# Patient Record
Sex: MALE | Race: WHITE | NOT HISPANIC OR LATINO | Employment: UNEMPLOYED | URBAN - METROPOLITAN AREA
[De-identification: names, ages, dates, MRNs, and addresses within clinical notes are randomized per-mention and may not be internally consistent; named-entity substitution may affect disease eponyms.]

---

## 2022-05-24 ENCOUNTER — CLINICAL SUPPORT (OUTPATIENT)
Dept: INTERNAL MEDICINE | Facility: CLINIC | Age: 42
End: 2022-05-24
Payer: MEDICAID

## 2022-05-24 DIAGNOSIS — Z86.79 HISTORY OF CONGESTIVE HEART FAILURE: ICD-10-CM

## 2022-05-24 DIAGNOSIS — I51.3 LEFT VENTRICULAR THROMBUS: Primary | ICD-10-CM

## 2022-05-24 PROCEDURE — 99211 OFF/OP EST MAY X REQ PHY/QHP: CPT | Mod: PBBFAC

## 2022-05-24 NOTE — PROGRESS NOTES
Poc Pt/Inr results 1.8/21.9 (Normal range 2.0-3.0) Inr level below normal range prescribed Asa 325 mg ran out missed several doses using a dietary supplement Pediasure contains vitamin k. Patient weight 203 pounds. Takes Warfarin 5 mg. Return to clinic 614/22

## 2022-05-26 ENCOUNTER — OFFICE VISIT (OUTPATIENT)
Dept: INTERNAL MEDICINE | Facility: CLINIC | Age: 42
End: 2022-05-26
Payer: MEDICAID

## 2022-05-26 ENCOUNTER — LAB VISIT (OUTPATIENT)
Dept: LAB | Facility: HOSPITAL | Age: 42
End: 2022-05-26
Payer: MEDICAID

## 2022-05-26 VITALS
OXYGEN SATURATION: 98 % | RESPIRATION RATE: 20 BRPM | SYSTOLIC BLOOD PRESSURE: 115 MMHG | HEIGHT: 70 IN | BODY MASS INDEX: 29.86 KG/M2 | HEART RATE: 56 BPM | DIASTOLIC BLOOD PRESSURE: 75 MMHG | WEIGHT: 208.56 LBS | TEMPERATURE: 98 F

## 2022-05-26 DIAGNOSIS — I51.3 LEFT VENTRICULAR THROMBUS: ICD-10-CM

## 2022-05-26 DIAGNOSIS — I50.9 CONGESTIVE HEART FAILURE, UNSPECIFIED HF CHRONICITY, UNSPECIFIED HEART FAILURE TYPE: ICD-10-CM

## 2022-05-26 DIAGNOSIS — Z13.31 POSITIVE DEPRESSION SCREENING: Primary | ICD-10-CM

## 2022-05-26 DIAGNOSIS — Z13.1 SCREENING FOR DIABETES MELLITUS: ICD-10-CM

## 2022-05-26 DIAGNOSIS — Z72.89 VAPES NON-NICOTINE CONTAINING SUBSTANCE: ICD-10-CM

## 2022-05-26 LAB
ALBUMIN SERPL-MCNC: 4.1 GM/DL (ref 3.5–5)
ALBUMIN/GLOB SERPL: 1 RATIO (ref 1.1–2)
ALP SERPL-CCNC: 100 UNIT/L (ref 40–150)
ALT SERPL-CCNC: 24 UNIT/L (ref 0–55)
APPEARANCE UR: CLEAR
AST SERPL-CCNC: 16 UNIT/L (ref 5–34)
BACTERIA #/AREA URNS AUTO: ABNORMAL /HPF
BASOPHILS # BLD AUTO: 0.07 X10(3)/MCL (ref 0–0.2)
BASOPHILS NFR BLD AUTO: 0.9 %
BILIRUB UR QL STRIP.AUTO: NEGATIVE MG/DL
BILIRUBIN DIRECT+TOT PNL SERPL-MCNC: 0.4 MG/DL
BUN SERPL-MCNC: 32.2 MG/DL (ref 8.9–20.6)
CALCIUM SERPL-MCNC: 9.9 MG/DL (ref 8.4–10.2)
CHLORIDE SERPL-SCNC: 104 MMOL/L (ref 98–107)
CO2 SERPL-SCNC: 28 MMOL/L (ref 22–29)
COLOR UR AUTO: COLORLESS
CREAT SERPL-MCNC: 1.65 MG/DL (ref 0.73–1.18)
CREAT UR-MCNC: 47.8 MG/DL (ref 63–166)
EOSINOPHIL # BLD AUTO: 0.39 X10(3)/MCL (ref 0–0.9)
EOSINOPHIL NFR BLD AUTO: 5.3 %
ERYTHROCYTE [DISTWIDTH] IN BLOOD BY AUTOMATED COUNT: 14.1 % (ref 11.5–17)
EST. AVERAGE GLUCOSE BLD GHB EST-MCNC: 154.2 MG/DL
GLOBULIN SER-MCNC: 4.1 GM/DL (ref 2.4–3.5)
GLUCOSE SERPL-MCNC: 84 MG/DL (ref 74–100)
GLUCOSE UR QL STRIP.AUTO: NORMAL MG/DL
HBA1C MFR BLD: 7 %
HCT VFR BLD AUTO: 43.5 % (ref 42–52)
HGB BLD-MCNC: 14.2 GM/DL (ref 14–18)
HYALINE CASTS #/AREA URNS LPF: ABNORMAL /LPF
IMM GRANULOCYTES # BLD AUTO: 0.02 X10(3)/MCL (ref 0–0.02)
IMM GRANULOCYTES NFR BLD AUTO: 0.3 % (ref 0–0.43)
KETONES UR QL STRIP.AUTO: NEGATIVE MG/DL
LEUKOCYTE ESTERASE UR QL STRIP.AUTO: NEGATIVE UNIT/L
LYMPHOCYTES # BLD AUTO: 1.5 X10(3)/MCL (ref 0.6–4.6)
LYMPHOCYTES NFR BLD AUTO: 20.3 %
MCH RBC QN AUTO: 28.8 PG (ref 27–31)
MCHC RBC AUTO-ENTMCNC: 32.6 MG/DL (ref 33–36)
MCV RBC AUTO: 88.2 FL (ref 80–94)
MICROALBUMIN UR-MCNC: 14.9 UG/ML
MICROALBUMIN/CREAT RATIO PNL UR: 31.2 MG/GM CR (ref 0–30)
MONOCYTES # BLD AUTO: 0.77 X10(3)/MCL (ref 0.1–1.3)
MONOCYTES NFR BLD AUTO: 10.4 %
MUCOUS THREADS URNS QL MICRO: ABNORMAL /LPF
NEUTROPHILS # BLD AUTO: 4.7 X10(3)/MCL (ref 2.1–9.2)
NEUTROPHILS NFR BLD AUTO: 62.8 %
NITRITE UR QL STRIP.AUTO: NEGATIVE
NRBC BLD AUTO-RTO: 0 %
PH UR STRIP.AUTO: 5 [PH]
PLATELET # BLD AUTO: 193 X10(3)/MCL (ref 130–400)
PMV BLD AUTO: 11.1 FL (ref 9.4–12.4)
POTASSIUM SERPL-SCNC: 4.4 MMOL/L (ref 3.5–5.1)
PROT SERPL-MCNC: 8.2 GM/DL (ref 6.4–8.3)
PROT UR QL STRIP.AUTO: NEGATIVE MG/DL
RBC # BLD AUTO: 4.93 X10(6)/MCL (ref 4.7–6.1)
RBC #/AREA URNS AUTO: ABNORMAL /HPF
RBC UR QL AUTO: NEGATIVE UNIT/L
SODIUM SERPL-SCNC: 140 MMOL/L (ref 136–145)
SP GR UR STRIP.AUTO: 1.01
SQUAMOUS #/AREA URNS LPF: ABNORMAL /HPF
UROBILINOGEN UR STRIP-ACNC: NORMAL MG/DL
WBC # SPEC AUTO: 7.4 X10(3)/MCL (ref 4.5–11.5)
WBC #/AREA URNS AUTO: ABNORMAL /HPF

## 2022-05-26 PROCEDURE — 1159F MED LIST DOCD IN RCRD: CPT | Mod: CPTII,,,

## 2022-05-26 PROCEDURE — 3074F SYST BP LT 130 MM HG: CPT | Mod: CPTII,,,

## 2022-05-26 PROCEDURE — 3008F BODY MASS INDEX DOCD: CPT | Mod: CPTII,,,

## 2022-05-26 PROCEDURE — 81001 URINALYSIS AUTO W/SCOPE: CPT

## 2022-05-26 PROCEDURE — 82043 UR ALBUMIN QUANTITATIVE: CPT

## 2022-05-26 PROCEDURE — 3008F PR BODY MASS INDEX (BMI) DOCUMENTED: ICD-10-PCS | Mod: CPTII,,,

## 2022-05-26 PROCEDURE — 80053 COMPREHEN METABOLIC PANEL: CPT

## 2022-05-26 PROCEDURE — 1160F RVW MEDS BY RX/DR IN RCRD: CPT | Mod: CPTII,,,

## 2022-05-26 PROCEDURE — 99213 OFFICE O/P EST LOW 20 MIN: CPT | Mod: S$PBB,,,

## 2022-05-26 PROCEDURE — 99214 OFFICE O/P EST MOD 30 MIN: CPT | Mod: PBBFAC

## 2022-05-26 PROCEDURE — 99213 PR OFFICE/OUTPT VISIT, EST, LEVL III, 20-29 MIN: ICD-10-PCS | Mod: S$PBB,,,

## 2022-05-26 PROCEDURE — 3078F DIAST BP <80 MM HG: CPT | Mod: CPTII,,,

## 2022-05-26 PROCEDURE — 1160F PR REVIEW ALL MEDS BY PRESCRIBER/CLIN PHARMACIST DOCUMENTED: ICD-10-PCS | Mod: CPTII,,,

## 2022-05-26 PROCEDURE — 36415 COLL VENOUS BLD VENIPUNCTURE: CPT

## 2022-05-26 PROCEDURE — 4010F PR ACE/ARB THEARPY RXD/TAKEN: ICD-10-PCS | Mod: CPTII,,,

## 2022-05-26 PROCEDURE — 85025 COMPLETE CBC W/AUTO DIFF WBC: CPT

## 2022-05-26 PROCEDURE — 1159F PR MEDICATION LIST DOCUMENTED IN MEDICAL RECORD: ICD-10-PCS | Mod: CPTII,,,

## 2022-05-26 PROCEDURE — 3074F PR MOST RECENT SYSTOLIC BLOOD PRESSURE < 130 MM HG: ICD-10-PCS | Mod: CPTII,,,

## 2022-05-26 PROCEDURE — 83036 HEMOGLOBIN GLYCOSYLATED A1C: CPT

## 2022-05-26 PROCEDURE — 3078F PR MOST RECENT DIASTOLIC BLOOD PRESSURE < 80 MM HG: ICD-10-PCS | Mod: CPTII,,,

## 2022-05-26 PROCEDURE — 4010F ACE/ARB THERAPY RXD/TAKEN: CPT | Mod: CPTII,,,

## 2022-05-26 RX ORDER — CARVEDILOL 6.25 MG/1
6.25 TABLET ORAL 2 TIMES DAILY
COMMUNITY
Start: 2022-05-17 | End: 2023-06-05 | Stop reason: ALTCHOICE

## 2022-05-26 RX ORDER — LISINOPRIL 5 MG/1
5 TABLET ORAL DAILY
COMMUNITY
Start: 2022-05-04 | End: 2022-05-26

## 2022-05-26 RX ORDER — FUROSEMIDE 40 MG/1
40 TABLET ORAL DAILY
COMMUNITY
Start: 2022-05-04 | End: 2022-11-30 | Stop reason: ALTCHOICE

## 2022-05-26 RX ORDER — ATORVASTATIN CALCIUM 40 MG/1
40 TABLET, FILM COATED ORAL DAILY
COMMUNITY
Start: 2022-05-04 | End: 2023-05-22 | Stop reason: SDUPTHER

## 2022-05-26 RX ORDER — LISINOPRIL 20 MG/1
20 TABLET ORAL DAILY
COMMUNITY
End: 2022-11-30 | Stop reason: ALTCHOICE

## 2022-05-26 RX ORDER — LISINOPRIL 10 MG/1
10 TABLET ORAL DAILY
COMMUNITY
Start: 2022-05-17 | End: 2022-05-26 | Stop reason: CLARIF

## 2022-05-26 NOTE — PROGRESS NOTES
"  Lumbar radiculopathy; Lumbar disc herniation   seen recently at [facility] and was told to come to [facility] to check INR and refer to coumadin clinic. states he was started on coumadin for a "blood clot in my heart".  denies abnormal bleeding or bruising.    Left ventricular thrombus  CHF  Previous PCP:         Establish Care (EST PCP, C/O OF FEELING BLOATED, RASH TO BACK AREA.)    Meds?  Diet?  Exercise?  Check bp at home? No   Check blood sugar at home?  Vaccines?  Smoke, drink, illicit drugs? nonnicotine vape, no  Eyes?  Dentist?  Bowels and Bladder?        Dixon Garcia-cardio      Previous PCP: Dr. Crespo passed away years ago.       Establish Care (EST PCP, C/O OF FEELING BLOATED, RASH TO BACK AREA.)          redo laminextomy / decompression/fusion L4-5-S1 (04/22/2015) Fusion Transforaminal Lumbar Interbody with O - Arm (Right) (04/22/2015) redo laminextomy / decompression/fusion L4-5-S1 (04/22/2015) redo laminextomy / decompression/fusion L4-5-S1 (04/22/2015) Excision of intervertebral disc (01/05/2015) Laminotomy (hemilaminectomy), with decompression of nerve root(s), including partial facetectomy, foraminotomy and/or excision of herniated intervertebral disc; 1 interspace, lumbar. (01/05/2015)    I have reviewed the positive depression score which warrants active treatment with psychotherapy and/or medications. ***  "

## 2022-05-27 ENCOUNTER — PATIENT MESSAGE (OUTPATIENT)
Dept: INTERNAL MEDICINE | Facility: CLINIC | Age: 42
End: 2022-05-27
Payer: MEDICAID

## 2022-05-27 DIAGNOSIS — Z11.59 SCREENING FOR VIRAL DISEASE: ICD-10-CM

## 2022-05-27 DIAGNOSIS — Z13.220 SCREENING FOR LIPID DISORDERS: ICD-10-CM

## 2022-05-27 DIAGNOSIS — Z13.0 SCREENING FOR IRON DEFICIENCY ANEMIA: ICD-10-CM

## 2022-05-27 DIAGNOSIS — Z13.21 ENCOUNTER FOR VITAMIN DEFICIENCY SCREENING: ICD-10-CM

## 2022-05-27 DIAGNOSIS — Z13.29 SCREENING FOR THYROID DISORDER: ICD-10-CM

## 2022-05-27 DIAGNOSIS — N18.31 STAGE 3A CHRONIC KIDNEY DISEASE: Primary | ICD-10-CM

## 2022-05-27 DIAGNOSIS — E11.29 TYPE 2 DIABETES MELLITUS WITH OTHER DIABETIC KIDNEY COMPLICATION, WITHOUT LONG-TERM CURRENT USE OF INSULIN: Primary | ICD-10-CM

## 2022-05-27 DIAGNOSIS — Z11.3 ROUTINE SCREENING FOR STI (SEXUALLY TRANSMITTED INFECTION): ICD-10-CM

## 2022-05-27 DIAGNOSIS — E11.29 TYPE 2 DIABETES MELLITUS WITH OTHER DIABETIC KIDNEY COMPLICATION, WITHOUT LONG-TERM CURRENT USE OF INSULIN: ICD-10-CM

## 2022-05-27 RX ORDER — GLIPIZIDE 2.5 MG/1
2.5 TABLET, EXTENDED RELEASE ORAL
Qty: 90 TABLET | Refills: 2 | Status: SHIPPED | OUTPATIENT
Start: 2022-05-27 | End: 2022-11-30 | Stop reason: ALTCHOICE

## 2022-05-27 RX ORDER — METFORMIN HYDROCHLORIDE 500 MG/1
500 TABLET, FILM COATED, EXTENDED RELEASE ORAL
Qty: 90 TABLET | Refills: 2 | Status: SHIPPED | OUTPATIENT
Start: 2022-05-27 | End: 2022-05-27

## 2022-05-27 NOTE — PROGRESS NOTES
"Subjective:       Patient ID: Jeff Sanchez Jr. is a 41 y.o. male.    Chief Complaint: Establish Care    HPI   Jeff Sanchez Jr.is a 41 y.o.White male presenting in clinic today to Establish Care. Previous PCP: Dr. Crespo () of [place], [place]. PMH: CHF, left ventricular thrombus (currently on warfarin), and lumbar radiculopathy with laminectomy, decompression, fusion L4-5-S1. He is currently followed by [facility]. Patient's mother is present during visit. Family on mother and father's side has a strong cardiac history. Patient's younger ister has suffered a stroke previously.    Patient was admitted into [facility] in [place], [place] with new onset CHF. At that time, he was discovered to have a left ventricular thrombus. Denies CP, SOB. He does not remember what his EF is currently, states its in the "20s." His mother states the cardiologist is considering a defibrillator if his EF is not improved at his next cardiology visit.     Today patient complains of "bloating" in his abdominal area. States he ate a hot dog for lunch today and experience early satiety. He does not weigh himself daily. Strongly encouraged diet and medication compliance. Also strongly encouraged patient to weigh himself daily. His PHQ-16. He states he is depressed due to his medical condition. He denies SI/HI. He denies need for medication or counseling. Encouraged patient to inform staff whenever he is ready for any intervention.    Patient states he vapes. Denies alcohol or illicit drug use. No vaccines due today. Denies chest pain, shortness of breath, cough, headache, dizziness, weakness, abdominal pain, nausea, vomiting, diarrhea, constipation, dysuria, depression, anxiety, SI, and HI.    I have reviewed the positive depression score which warrants active treatment with psychotherapy and/or medications.       Review of Systems   Constitutional: Positive for appetite change.   HENT: Negative.    Eyes: Negative.  "   Respiratory: Negative.    Cardiovascular: Negative.    Gastrointestinal: Positive for abdominal distention.   Endocrine: Negative.    Genitourinary: Positive for frequency.   Musculoskeletal: Negative.    Integumentary:  Negative.   Allergic/Immunologic: Negative.    Neurological: Negative.  Negative for dizziness.   Hematological: Negative.    Psychiatric/Behavioral: Positive for dysphoric mood. Negative for self-injury and suicidal ideas. The patient is not nervous/anxious.    All other systems reviewed and are negative.        Objective:      Physical Exam  Constitutional:       Appearance: Normal appearance. He is normal weight.   HENT:      Head: Normocephalic.      Nose: Nose normal.      Mouth/Throat:      Mouth: Mucous membranes are moist.      Pharynx: Oropharynx is clear.   Eyes:      Extraocular Movements: Extraocular movements intact.      Conjunctiva/sclera: Conjunctivae normal.      Pupils: Pupils are equal, round, and reactive to light.   Cardiovascular:      Rate and Rhythm: Normal rate and regular rhythm.      Heart sounds: Normal heart sounds.   Pulmonary:      Effort: Pulmonary effort is normal.      Breath sounds: Normal breath sounds.   Abdominal:      General: Bowel sounds are normal. There is distension.      Palpations: Abdomen is soft.   Musculoskeletal:         General: Normal range of motion.      Cervical back: Normal range of motion.      Right lower le+ Pitting Edema present.      Left lower le+ Pitting Edema present.   Skin:     General: Skin is warm and dry.      Capillary Refill: Capillary refill takes less than 2 seconds.   Neurological:      General: No focal deficit present.      Mental Status: He is alert and oriented to person, place, and time.   Psychiatric:         Mood and Affect: Mood normal.         Assessment and Plan:       Problem List Items Addressed This Visit        Cardiac/Vascular    Congestive heart failure     Currently followed by Dr. Dixon Garcia  (cardiology).  Will request records from his office.  Continue carvedilol, furosemide, atorvastatin, and aspirin.   Keep Cardiology Clinic appts as scheduled.  Last ECHO-requesting records.  Notify the clinic if you gain 3 or more pounds in one day or 5 or more pounds in one week.  Stressed importance of daily morning weights after urination but prior to breakfast on the same scale.  Low Sodium Diet (Dash Diet - less than 2 grams of sodium per day).  Follow a low cholesterol, low saturated fat diet with less that 200mg of cholesterol a day.  Cut down on alcohol if you have more than 1 drink a day (for women) or 2 drinks a day (for men).  Maintain healthy weight with goal BMI <30. Perform 30 minutes of daily physical activity 5 days per week.  Report to ER for chest pain, SOB, difficulty breathing, abdominal distention or significant edema to lower extremities.           Relevant Orders    CBC Auto Differential (Completed)    Comprehensive Metabolic Panel (Completed)    Urinalysis, Reflex to Urine Culture Urine, Clean Catch (Completed)    Microalbumin/Creatinine Ratio, Urine (Completed)    Left ventricular thrombus     Currently followed by [facility].   Continue warfarin.  Maintain INR 2-3.  4/25/2022-INR-2.3.              Endocrine    BMI 29.0-29.9,adult     Body mass index is 29.53 kg/m². (At goal).             RESOLVED: Screening for diabetes mellitus    Relevant Orders    Hemoglobin A1C (Completed)       Other    Positive depression screening - Primary     I have reviewed the positive depression score which warrants active treatment with psychotherapy and/or medications.  PHQ9 5/26/2022   Total Score 16     Declines medication.  Declines behavioral health referral.  Read positive daily meditations, avoid negative media, set healthy boundaries.   Exercise daily, keep consistent sleep pattern, eat a healthy diet.   Establish good social support, make changes to reduce stress.   Do not drink alcohol or  use illicit drugs.   Reports any symptoms of suicidal/homicidal ideations or self harm immediately, go to nearest emergency room.            Vapes non-nicotine containing substance     Vaping cessation discussed.

## 2022-05-27 NOTE — ASSESSMENT & PLAN NOTE
Currently followed by Dr. Dixon Garcia (cardiology).  Will request records from his office.  Continue carvedilol, furosemide, atorvastatin, and aspirin.   Keep Cardiology Clinic appts as scheduled.  Last ECHO-requesting records.  Notify the clinic if you gain 3 or more pounds in one day or 5 or more pounds in one week.  Stressed importance of daily morning weights after urination but prior to breakfast on the same scale.  Low Sodium Diet (Dash Diet - less than 2 grams of sodium per day).  Follow a low cholesterol, low saturated fat diet with less that 200mg of cholesterol a day.  Cut down on alcohol if you have more than 1 drink a day (for women) or 2 drinks a day (for men).  Maintain healthy weight with goal BMI <30. Perform 30 minutes of daily physical activity 5 days per week.  Report to ER for chest pain, SOB, difficulty breathing, abdominal distention or significant edema to lower extremities.

## 2022-05-27 NOTE — ASSESSMENT & PLAN NOTE
I have reviewed the positive depression score which warrants active treatment with psychotherapy and/or medications.  PHQ9 5/26/2022   Total Score 16     Declines medication.  Declines behavioral health referral.  Read positive daily meditations, avoid negative media, set healthy boundaries.   Exercise daily, keep consistent sleep pattern, eat a healthy diet.   Establish good social support, make changes to reduce stress.   Do not drink alcohol or use illicit drugs.   Reports any symptoms of suicidal/homicidal ideations or self harm immediately, go to nearest emergency room.

## 2022-05-27 NOTE — ASSESSMENT & PLAN NOTE
Currently followed by [facility].   Continue warfarin.  Maintain INR 2-3.  4/25/2022-INR-2.3.

## 2022-06-14 ENCOUNTER — CLINICAL SUPPORT (OUTPATIENT)
Dept: INTERNAL MEDICINE | Facility: CLINIC | Age: 42
End: 2022-06-14
Payer: MEDICAID

## 2022-06-14 DIAGNOSIS — Z86.79 HISTORY OF CONGESTIVE HEART FAILURE: ICD-10-CM

## 2022-06-14 DIAGNOSIS — I51.3 LEFT VENTRICULAR THROMBUS: Primary | ICD-10-CM

## 2022-06-14 PROCEDURE — 99211 OFF/OP EST MAY X REQ PHY/QHP: CPT | Mod: PBBFAC

## 2022-06-14 RX ORDER — WARFARIN 6 MG/1
6 TABLET ORAL DAILY
COMMUNITY
End: 2022-06-14 | Stop reason: SDUPTHER

## 2022-06-14 NOTE — PROGRESS NOTES
Poc Pt/Inr results 2.5/29.4 (Hx of left ventricular thrombus, CHF Normal range 2.0-3.0) Inr level within normal range. Takes Warfarin 5 mg was drinking Pediasure which contain vitamin k and Asa 325 mg to help increase Inr level will discontinue pediasure and Asa Warfarin dose adjusted to 6 mg verbal and written instructions given to the patient verbalizes understanding instructions. Patient weight 204 pounds. Return to clinic 7/14/22.

## 2022-07-14 ENCOUNTER — CLINICAL SUPPORT (OUTPATIENT)
Dept: INTERNAL MEDICINE | Facility: CLINIC | Age: 42
End: 2022-07-14
Payer: MEDICAID

## 2022-07-14 DIAGNOSIS — I51.3 LEFT VENTRICULAR THROMBUS: Primary | ICD-10-CM

## 2022-07-14 DIAGNOSIS — Z86.79 HISTORY OF CONGESTIVE HEART FAILURE: ICD-10-CM

## 2022-07-14 PROCEDURE — 99211 OFF/OP EST MAY X REQ PHY/QHP: CPT | Mod: PBBFAC

## 2022-07-14 NOTE — PROGRESS NOTES
Poc Pt/Inr results 2.7/31.8 ( Hx of Left Ventricular Thrombus, HF Normal Inr range 2.0-3.0) Inr level within normal range. Warfarin dose 6 mg. Patient weight 198 pounds. Return to clinic 8/24/22.

## 2022-07-28 DIAGNOSIS — N18.31 STAGE 3A CHRONIC KIDNEY DISEASE: Primary | ICD-10-CM

## 2022-08-01 ENCOUNTER — HOSPITAL ENCOUNTER (OUTPATIENT)
Dept: RADIOLOGY | Facility: HOSPITAL | Age: 42
Discharge: HOME | End: 2022-08-01
Payer: MEDICAID

## 2022-08-01 DIAGNOSIS — N18.31 STAGE 3A CHRONIC KIDNEY DISEASE: ICD-10-CM

## 2022-08-01 PROCEDURE — 76775 US EXAM ABDO BACK WALL LIM: CPT | Mod: TC

## 2022-08-04 ENCOUNTER — TELEPHONE (OUTPATIENT)
Dept: INTERNAL MEDICINE | Facility: CLINIC | Age: 42
End: 2022-08-04
Payer: MEDICAID

## 2022-08-04 NOTE — TELEPHONE ENCOUNTER
Please inform Jeff Sanchez Jr. of the following:    Kidney ultrasound shows:  Small cyst of the left kidney and a kidney stone. No other abnormalities noted.      For any questions, please let me know.  Thank you,    MARGUERITE Enriquez

## 2022-08-05 NOTE — TELEPHONE ENCOUNTER
Attempted to contact pt at number listed in chart, no answer. Unable to leave a  VM due to no VM set up.

## 2022-08-07 ENCOUNTER — PATIENT MESSAGE (OUTPATIENT)
Dept: INTERNAL MEDICINE | Facility: CLINIC | Age: 42
End: 2022-08-07
Payer: MEDICAID

## 2022-08-08 NOTE — TELEPHONE ENCOUNTER
Attempted to contact pt at number listed in chart, no answer. Unable to leave a VM due to VM being full.

## 2022-08-09 DIAGNOSIS — N28.1 CYST OF KIDNEY, ACQUIRED: ICD-10-CM

## 2022-08-09 NOTE — TELEPHONE ENCOUNTER
Attempted to contact pt at number listed in chart, no answer. Unable to leave a VM due to VM being full. Pt letter mailed.

## 2022-08-18 ENCOUNTER — LAB VISIT (OUTPATIENT)
Dept: LAB | Facility: HOSPITAL | Age: 42
End: 2022-08-18
Payer: MEDICAID

## 2022-08-18 DIAGNOSIS — Z13.29 SCREENING FOR THYROID DISORDER: ICD-10-CM

## 2022-08-18 DIAGNOSIS — Z11.59 SCREENING FOR VIRAL DISEASE: ICD-10-CM

## 2022-08-18 DIAGNOSIS — Z11.3 ROUTINE SCREENING FOR STI (SEXUALLY TRANSMITTED INFECTION): ICD-10-CM

## 2022-08-18 DIAGNOSIS — Z13.220 SCREENING FOR LIPID DISORDERS: ICD-10-CM

## 2022-08-18 DIAGNOSIS — Z13.0 SCREENING FOR IRON DEFICIENCY ANEMIA: ICD-10-CM

## 2022-08-18 DIAGNOSIS — Z13.21 ENCOUNTER FOR VITAMIN DEFICIENCY SCREENING: ICD-10-CM

## 2022-08-18 DIAGNOSIS — E11.29 TYPE 2 DIABETES MELLITUS WITH OTHER DIABETIC KIDNEY COMPLICATION, WITHOUT LONG-TERM CURRENT USE OF INSULIN: ICD-10-CM

## 2022-08-18 LAB
ALBUMIN SERPL-MCNC: 4.2 GM/DL (ref 3.5–5)
ALBUMIN/GLOB SERPL: 1.2 RATIO (ref 1.1–2)
ALP SERPL-CCNC: 99 UNIT/L (ref 40–150)
ALT SERPL-CCNC: 31 UNIT/L (ref 0–55)
AST SERPL-CCNC: 18 UNIT/L (ref 5–34)
BILIRUBIN DIRECT+TOT PNL SERPL-MCNC: 0.7 MG/DL
BUN SERPL-MCNC: 15.1 MG/DL (ref 8.9–20.6)
CALCIUM SERPL-MCNC: 9.8 MG/DL (ref 8.4–10.2)
CHLORIDE SERPL-SCNC: 102 MMOL/L (ref 98–107)
CHOLEST SERPL-MCNC: 151 MG/DL
CHOLEST/HDLC SERPL: 6 {RATIO} (ref 0–5)
CO2 SERPL-SCNC: 29 MMOL/L (ref 22–29)
CREAT SERPL-MCNC: 1.15 MG/DL (ref 0.73–1.18)
DEPRECATED CALCIDIOL+CALCIFEROL SERPL-MC: 39.3 NG/ML (ref 30–80)
EST. AVERAGE GLUCOSE BLD GHB EST-MCNC: 105.4 MG/DL
FERRITIN SERPL-MCNC: 342.62 NG/ML (ref 21.81–274.66)
GFR SERPLBLD CREATININE-BSD FMLA CKD-EPI: >60 MLS/MIN/1.73/M2
GLOBULIN SER-MCNC: 3.4 GM/DL (ref 2.4–3.5)
GLUCOSE SERPL-MCNC: 102 MG/DL (ref 74–100)
HAV IGM SERPL QL IA: NONREACTIVE
HBA1C MFR BLD: 5.3 %
HBV CORE IGM SERPL QL IA: NONREACTIVE
HBV SURFACE AG SERPL QL IA: NONREACTIVE
HCV AB SERPL QL IA: NONREACTIVE
HDLC SERPL-MCNC: 27 MG/DL (ref 35–60)
IRON SATN MFR SERPL: 29 % (ref 20–50)
IRON SERPL-MCNC: 68 UG/DL (ref 65–175)
LDLC SERPL CALC-MCNC: 71 MG/DL (ref 50–140)
PATH REV: NORMAL
POTASSIUM SERPL-SCNC: 3.9 MMOL/L (ref 3.5–5.1)
PROT SERPL-MCNC: 7.6 GM/DL (ref 6.4–8.3)
SODIUM SERPL-SCNC: 140 MMOL/L (ref 136–145)
T4 FREE SERPL-MCNC: 0.96 NG/DL (ref 0.7–1.48)
TIBC SERPL-MCNC: 164 UG/DL (ref 69–240)
TIBC SERPL-MCNC: 232 UG/DL (ref 250–450)
TRANSFERRIN SERPL-MCNC: 209 MG/DL (ref 174–364)
TRIGL SERPL-MCNC: 266 MG/DL (ref 34–140)
TSH SERPL-ACNC: 0.41 UIU/ML (ref 0.35–4.94)
VLDLC SERPL CALC-MCNC: 53 MG/DL

## 2022-08-18 PROCEDURE — 80074 ACUTE HEPATITIS PANEL: CPT

## 2022-08-18 PROCEDURE — 82728 ASSAY OF FERRITIN: CPT

## 2022-08-18 PROCEDURE — 83036 HEMOGLOBIN GLYCOSYLATED A1C: CPT

## 2022-08-18 PROCEDURE — 80061 LIPID PANEL: CPT

## 2022-08-18 PROCEDURE — 84443 ASSAY THYROID STIM HORMONE: CPT

## 2022-08-18 PROCEDURE — 83540 ASSAY OF IRON: CPT

## 2022-08-18 PROCEDURE — 80053 COMPREHEN METABOLIC PANEL: CPT

## 2022-08-18 PROCEDURE — 36415 COLL VENOUS BLD VENIPUNCTURE: CPT

## 2022-08-18 PROCEDURE — 82306 VITAMIN D 25 HYDROXY: CPT

## 2022-08-18 PROCEDURE — 84439 ASSAY OF FREE THYROXINE: CPT

## 2022-08-23 RX ORDER — ONDANSETRON 4 MG/1
TABLET, ORALLY DISINTEGRATING ORAL DAILY PRN
COMMUNITY
Start: 2022-08-01 | End: 2022-09-14

## 2022-08-23 RX ORDER — SUCRALFATE 1 G/1
1 TABLET ORAL 4 TIMES DAILY
COMMUNITY
Start: 2022-08-01 | End: 2022-08-24 | Stop reason: SDUPTHER

## 2022-08-23 RX ORDER — OMEPRAZOLE 20 MG/1
20 CAPSULE, DELAYED RELEASE ORAL DAILY
COMMUNITY
Start: 2022-08-01 | End: 2022-08-24 | Stop reason: SDUPTHER

## 2022-08-23 RX ORDER — WARFARIN SODIUM 5 MG/1
5 TABLET ORAL
COMMUNITY
Start: 2022-08-02 | End: 2022-09-09 | Stop reason: SDUPTHER

## 2022-08-23 RX ORDER — DICYCLOMINE HYDROCHLORIDE 20 MG/1
20 TABLET ORAL 2 TIMES DAILY
COMMUNITY
Start: 2022-08-01 | End: 2022-08-24 | Stop reason: SDUPTHER

## 2022-08-24 ENCOUNTER — CLINICAL SUPPORT (OUTPATIENT)
Dept: INTERNAL MEDICINE | Facility: CLINIC | Age: 42
End: 2022-08-24
Payer: MEDICAID

## 2022-08-24 ENCOUNTER — OFFICE VISIT (OUTPATIENT)
Dept: INTERNAL MEDICINE | Facility: CLINIC | Age: 42
End: 2022-08-24
Payer: MEDICAID

## 2022-08-24 ENCOUNTER — PATIENT MESSAGE (OUTPATIENT)
Dept: INTERNAL MEDICINE | Facility: CLINIC | Age: 42
End: 2022-08-24

## 2022-08-24 VITALS
SYSTOLIC BLOOD PRESSURE: 133 MMHG | HEIGHT: 70 IN | TEMPERATURE: 98 F | RESPIRATION RATE: 18 BRPM | BODY MASS INDEX: 27.06 KG/M2 | DIASTOLIC BLOOD PRESSURE: 89 MMHG | HEART RATE: 48 BPM | OXYGEN SATURATION: 97 % | WEIGHT: 189 LBS

## 2022-08-24 DIAGNOSIS — Z13.21 ENCOUNTER FOR VITAMIN DEFICIENCY SCREENING: ICD-10-CM

## 2022-08-24 DIAGNOSIS — E11.29 TYPE 2 DIABETES MELLITUS WITH OTHER DIABETIC KIDNEY COMPLICATION, WITHOUT LONG-TERM CURRENT USE OF INSULIN: ICD-10-CM

## 2022-08-24 DIAGNOSIS — Z72.89 VAPES NON-NICOTINE CONTAINING SUBSTANCE: ICD-10-CM

## 2022-08-24 DIAGNOSIS — E78.2 MIXED HYPERLIPIDEMIA: ICD-10-CM

## 2022-08-24 DIAGNOSIS — Z12.5 SCREENING FOR PROSTATE CANCER: ICD-10-CM

## 2022-08-24 DIAGNOSIS — K21.9 GASTROESOPHAGEAL REFLUX DISEASE WITHOUT ESOPHAGITIS: Primary | ICD-10-CM

## 2022-08-24 DIAGNOSIS — Z86.79 HISTORY OF CONGESTIVE HEART FAILURE: ICD-10-CM

## 2022-08-24 DIAGNOSIS — R10.9 ABDOMINAL CRAMPING: ICD-10-CM

## 2022-08-24 DIAGNOSIS — I50.9 CONGESTIVE HEART FAILURE, UNSPECIFIED HF CHRONICITY, UNSPECIFIED HEART FAILURE TYPE: ICD-10-CM

## 2022-08-24 DIAGNOSIS — I51.3 LEFT VENTRICULAR THROMBUS: Primary | ICD-10-CM

## 2022-08-24 DIAGNOSIS — Z11.4 SCREENING FOR HIV (HUMAN IMMUNODEFICIENCY VIRUS): ICD-10-CM

## 2022-08-24 DIAGNOSIS — I51.3 LEFT VENTRICULAR THROMBUS: ICD-10-CM

## 2022-08-24 PROBLEM — Z13.31 POSITIVE DEPRESSION SCREENING: Status: RESOLVED | Noted: 2022-05-26 | Resolved: 2022-08-24

## 2022-08-24 PROCEDURE — 4010F ACE/ARB THERAPY RXD/TAKEN: CPT | Mod: CPTII,,,

## 2022-08-24 PROCEDURE — 99214 OFFICE O/P EST MOD 30 MIN: CPT | Mod: S$PBB,,,

## 2022-08-24 PROCEDURE — 3075F PR MOST RECENT SYSTOLIC BLOOD PRESS GE 130-139MM HG: ICD-10-PCS | Mod: CPTII,,,

## 2022-08-24 PROCEDURE — 1160F PR REVIEW ALL MEDS BY PRESCRIBER/CLIN PHARMACIST DOCUMENTED: ICD-10-PCS | Mod: CPTII,,,

## 2022-08-24 PROCEDURE — 99214 PR OFFICE/OUTPT VISIT, EST, LEVL IV, 30-39 MIN: ICD-10-PCS | Mod: S$PBB,,,

## 2022-08-24 PROCEDURE — 4010F PR ACE/ARB THEARPY RXD/TAKEN: ICD-10-PCS | Mod: CPTII,,,

## 2022-08-24 PROCEDURE — 3079F PR MOST RECENT DIASTOLIC BLOOD PRESSURE 80-89 MM HG: ICD-10-PCS | Mod: CPTII,,,

## 2022-08-24 PROCEDURE — 1160F RVW MEDS BY RX/DR IN RCRD: CPT | Mod: CPTII,,,

## 2022-08-24 PROCEDURE — 99215 OFFICE O/P EST HI 40 MIN: CPT | Mod: PBBFAC

## 2022-08-24 PROCEDURE — 3008F PR BODY MASS INDEX (BMI) DOCUMENTED: ICD-10-PCS | Mod: CPTII,,,

## 2022-08-24 PROCEDURE — 3008F BODY MASS INDEX DOCD: CPT | Mod: CPTII,,,

## 2022-08-24 PROCEDURE — 3079F DIAST BP 80-89 MM HG: CPT | Mod: CPTII,,,

## 2022-08-24 PROCEDURE — 1159F PR MEDICATION LIST DOCUMENTED IN MEDICAL RECORD: ICD-10-PCS | Mod: CPTII,,,

## 2022-08-24 PROCEDURE — 3075F SYST BP GE 130 - 139MM HG: CPT | Mod: CPTII,,,

## 2022-08-24 PROCEDURE — 1159F MED LIST DOCD IN RCRD: CPT | Mod: CPTII,,,

## 2022-08-24 RX ORDER — SUCRALFATE 1 G/1
1 TABLET ORAL 3 TIMES DAILY
Qty: 180 TABLET | Refills: 1 | Status: SHIPPED | OUTPATIENT
Start: 2022-08-24 | End: 2022-09-14

## 2022-08-24 RX ORDER — OMEPRAZOLE 20 MG/1
20 CAPSULE, DELAYED RELEASE ORAL NIGHTLY
Qty: 90 CAPSULE | Refills: 1 | Status: SHIPPED | OUTPATIENT
Start: 2022-08-24 | End: 2022-11-30 | Stop reason: ALTCHOICE

## 2022-08-24 RX ORDER — DICYCLOMINE HYDROCHLORIDE 20 MG/1
20 TABLET ORAL 2 TIMES DAILY PRN
Qty: 45 TABLET | Refills: 1 | Status: SHIPPED | OUTPATIENT
Start: 2022-08-24 | End: 2022-11-30 | Stop reason: ALTCHOICE

## 2022-08-24 NOTE — PROGRESS NOTES
Poc Pt/Inr results 3.6/43.4 (Hx of Left VentricularTrombus, CHF normal Inr range 2.0-3.0). Inr results above normal range have had a weight loss of 14 pounds since 6/14/22 weight 204 pounds instructed to hold today's Warfarin dose go to the ER for bleeding issues resume adjusted Warfarin dose 8/25/22 verbal and written instructions given to the patient verbalizes understanding instructions. Warfarin dose 5 mg. Patient weight 190 pounds. Return to clinic 9/8/22.

## 2022-08-24 NOTE — PATIENT INSTRUCTIONS
Please call the cardiology clinic and inform them of the HR in the 40s. He is on carvedilol 6.25 mg twice daily.

## 2022-08-25 NOTE — ASSESSMENT & PLAN NOTE
Avoid spicy, acidic, fried food and alcohol.    Eat 2-3 hours before bed.   Avoid tight fitting clothes and chew food thoroughly.    Reduce caffeine intake, avoid soda.    Take omeprazole and sucralfate as prescribed.    Encouraged vaping cessation.

## 2022-08-25 NOTE — ASSESSMENT & PLAN NOTE
Lab Results   Component Value Date    LDL 71.00 08/18/2022       Lab Results   Component Value Date    TRIG 266 (H) 08/18/2022       Lab Results   Component Value Date    HDL 27 (L) 08/18/2022        Lab Results   Component Value Date    CHOL 151 08/18/2022      Continue atorvastatin as prescribed.   Follow a low cholesterol, low saturated fat diet with less than 200 mg of cholesterol a day.   Avoid fried foods and high saturated fats.  Add flax seed or fish oil supplements to diet.   Increase dietary fiber.   Regular exercise improves cholesterol levels.  Physical activity 5 times a week for 30 minutes per day (or 150 minutes per week).   Stressed importance of dietary modifications.

## 2022-08-25 NOTE — ASSESSMENT & PLAN NOTE
Lab Results   Component Value Date    HGBA1C 5.3 08/18/2022     at goal.  Continue glipizide as prescribed.   Follow ADA diet.   Avoid soda, simple sweets, and limit rice/pasta/bread/starches and consume brown options when possible.    Maintain healthy weight with BMI goal <30.    Perform aerobic exercise for 150 minutes per week (or 5 days a week for 30 minutes each day).    Examine feet daily.    Obtain annual dilated eye exam.   Eye exam: Deferred until next visit.  Foot exam: Deferred until next visit.

## 2022-08-25 NOTE — ASSESSMENT & PLAN NOTE
Currently followed by [facility] coumadin clinic-next appt: 9/8/2022.  Maintain INR 2-3.  8/24/2022-INR: 3.6-warfarin decreased to 5 mg.  ED precautions for bleeding.

## 2022-08-25 NOTE — ASSESSMENT & PLAN NOTE
Continue carvedilol, lasix, lisinopril.  Keep Cardiology Clinic appts as scheduled.  Notify cardiologist Dr. Garcia of bradycardia.  Notify the clinic if you gain 3 or more pounds in one day or 5 or more pounds in one week.  Stressed importance of daily morning weights after urination but prior to breakfast on the same scale.  Low Sodium Diet (Dash Diet - less than 2 grams of sodium per day).  Follow a low cholesterol, low saturated fat diet with less that 200mg of cholesterol a day.  Cut down on alcohol if you have more than 1 drink a day (for women) or 2 drinks a day (for men).  Maintain healthy weight with goal BMI <30. Perform 30 minutes of daily physical activity 5 days per week.  Report to ER for chest pain, SOB, difficulty breathing, abdominal distention or significant edema to lower extremities.  Most recent ECHO in June per patient, will request records.

## 2022-09-09 ENCOUNTER — HOSPITAL ENCOUNTER (OUTPATIENT)
Dept: RADIOLOGY | Facility: HOSPITAL | Age: 42
Discharge: HOME | End: 2022-09-09
Payer: MEDICAID

## 2022-09-09 ENCOUNTER — CLINICAL SUPPORT (OUTPATIENT)
Dept: INTERNAL MEDICINE | Facility: CLINIC | Age: 42
End: 2022-09-09
Payer: MEDICAID

## 2022-09-09 DIAGNOSIS — N28.1 CYST OF KIDNEY, ACQUIRED: ICD-10-CM

## 2022-09-09 DIAGNOSIS — Z86.79 HISTORY OF CONGESTIVE HEART FAILURE: ICD-10-CM

## 2022-09-09 DIAGNOSIS — I51.3 LEFT VENTRICULAR THROMBUS: Primary | ICD-10-CM

## 2022-09-09 PROCEDURE — 99211 OFF/OP EST MAY X REQ PHY/QHP: CPT | Mod: PBBFAC,59

## 2022-09-09 NOTE — PROGRESS NOTES
Poc Pt/Inr results 1.8/22.1 (Hx of  Left Ventricular Thrombus, CHF Normal Inr range 2.0-3.0) Inr level below normal range was required to hold doses of Warfarin due to an elevated Inr level. Warfarin dose 5 mg. Patient weight 186 pounds. Return to clinic 9/21/22.

## 2022-09-09 NOTE — PROGRESS NOTES
PT CLAUSTROPHOBIC, COULD NOT DO MRI AT [facility], PT WILL CALL SCHEDULING TO GET EXAM DONE AT [facility] W/ OPEN MRI

## 2022-09-14 ENCOUNTER — OFFICE VISIT (OUTPATIENT)
Dept: NEPHROLOGY | Facility: CLINIC | Age: 42
End: 2022-09-14
Payer: MEDICAID

## 2022-09-14 VITALS
OXYGEN SATURATION: 99 % | SYSTOLIC BLOOD PRESSURE: 137 MMHG | TEMPERATURE: 98 F | DIASTOLIC BLOOD PRESSURE: 84 MMHG | WEIGHT: 191.81 LBS | HEART RATE: 51 BPM | BODY MASS INDEX: 27.46 KG/M2 | HEIGHT: 70 IN | RESPIRATION RATE: 20 BRPM

## 2022-09-14 DIAGNOSIS — N18.2 CKD STAGE G2/A1, GFR 60-89 AND ALBUMIN CREATININE RATIO <30 MG/G: Primary | ICD-10-CM

## 2022-09-14 DIAGNOSIS — Z72.0 TOBACCO ABUSE: ICD-10-CM

## 2022-09-14 PROBLEM — Z79.01 WARFARIN ANTICOAGULATION: Status: ACTIVE | Noted: 2022-09-14

## 2022-09-14 PROBLEM — I42.7 CARDIOMYOPATHY SECONDARY TO DRUG: Status: ACTIVE | Noted: 2022-04-06

## 2022-09-14 PROBLEM — R79.89 ELEVATED TROPONIN: Status: ACTIVE | Noted: 2022-03-30

## 2022-09-14 PROBLEM — I50.9 CHF EXACERBATION: Status: ACTIVE | Noted: 2022-03-30

## 2022-09-14 PROCEDURE — 1160F RVW MEDS BY RX/DR IN RCRD: CPT | Mod: CPTII,,, | Performed by: NURSE PRACTITIONER

## 2022-09-14 PROCEDURE — 3008F BODY MASS INDEX DOCD: CPT | Mod: CPTII,,, | Performed by: NURSE PRACTITIONER

## 2022-09-14 PROCEDURE — 3075F SYST BP GE 130 - 139MM HG: CPT | Mod: CPTII,,, | Performed by: NURSE PRACTITIONER

## 2022-09-14 PROCEDURE — 4010F ACE/ARB THERAPY RXD/TAKEN: CPT | Mod: CPTII,,, | Performed by: NURSE PRACTITIONER

## 2022-09-14 PROCEDURE — 4010F PR ACE/ARB THEARPY RXD/TAKEN: ICD-10-PCS | Mod: CPTII,,, | Performed by: NURSE PRACTITIONER

## 2022-09-14 PROCEDURE — 3008F PR BODY MASS INDEX (BMI) DOCUMENTED: ICD-10-PCS | Mod: CPTII,,, | Performed by: NURSE PRACTITIONER

## 2022-09-14 PROCEDURE — 1159F PR MEDICATION LIST DOCUMENTED IN MEDICAL RECORD: ICD-10-PCS | Mod: CPTII,,, | Performed by: NURSE PRACTITIONER

## 2022-09-14 PROCEDURE — 3075F PR MOST RECENT SYSTOLIC BLOOD PRESS GE 130-139MM HG: ICD-10-PCS | Mod: CPTII,,, | Performed by: NURSE PRACTITIONER

## 2022-09-14 PROCEDURE — 3066F PR DOCUMENTATION OF TREATMENT FOR NEPHROPATHY: ICD-10-PCS | Mod: CPTII,,, | Performed by: NURSE PRACTITIONER

## 2022-09-14 PROCEDURE — 3079F PR MOST RECENT DIASTOLIC BLOOD PRESSURE 80-89 MM HG: ICD-10-PCS | Mod: CPTII,,, | Performed by: NURSE PRACTITIONER

## 2022-09-14 PROCEDURE — 99204 OFFICE O/P NEW MOD 45 MIN: CPT | Mod: S$PBB,,, | Performed by: NURSE PRACTITIONER

## 2022-09-14 PROCEDURE — 1160F PR REVIEW ALL MEDS BY PRESCRIBER/CLIN PHARMACIST DOCUMENTED: ICD-10-PCS | Mod: CPTII,,, | Performed by: NURSE PRACTITIONER

## 2022-09-14 PROCEDURE — 3066F NEPHROPATHY DOC TX: CPT | Mod: CPTII,,, | Performed by: NURSE PRACTITIONER

## 2022-09-14 PROCEDURE — 3079F DIAST BP 80-89 MM HG: CPT | Mod: CPTII,,, | Performed by: NURSE PRACTITIONER

## 2022-09-14 PROCEDURE — 99214 OFFICE O/P EST MOD 30 MIN: CPT | Mod: PBBFAC | Performed by: NURSE PRACTITIONER

## 2022-09-14 PROCEDURE — 1159F MED LIST DOCD IN RCRD: CPT | Mod: CPTII,,, | Performed by: NURSE PRACTITIONER

## 2022-09-14 PROCEDURE — 99204 PR OFFICE/OUTPT VISIT, NEW, LEVL IV, 45-59 MIN: ICD-10-PCS | Mod: S$PBB,,, | Performed by: NURSE PRACTITIONER

## 2022-09-14 RX ORDER — CYPROHEPTADINE HYDROCHLORIDE 4 MG/1
4 TABLET ORAL NIGHTLY
Qty: 90 TABLET | Refills: 0 | Status: SHIPPED | OUTPATIENT
Start: 2022-09-14 | End: 2022-11-30 | Stop reason: ALTCHOICE

## 2022-09-14 RX ORDER — HYDROXYZINE PAMOATE 50 MG/1
50 CAPSULE ORAL DAILY PRN
COMMUNITY
Start: 2022-09-11 | End: 2022-09-14

## 2022-09-14 NOTE — PROGRESS NOTES
[facility]  Nephrology Clinic Note   No chief complaint on file.     History of Present Illness  Jeff Sanchez Jr. is a 42 y.o. White male with past medical history of hypertension, recently diagnosed heart failure with severely reduced ejection fraction, left ventricular thrombus, lumbar radiculopathy, status post laminectomy, L4-5-S1 fusion.  Patient was admitted at [facility] in March of 2022, where he was initially diagnosed with heart failure.  UDS was positive for amphetamines.  Patient had COVID-19 infection in January of 2022, which he believes precipitated his illness.  He is now fully anticoagulated with Coumadin, sees Dr. Garcia for cardiology care.  Most recent LVF was less than 20% during hospitalization in April 2022.  Presents to establish care in Nephrology Clinic today, accompanied by his mother.  Complains of fatigue and heavy feeling in his legs.  Denies chest pain or shortness of breath.    Review of Systems  Twelve point review of systems conducted, negative except as stated in history of present illness.    Review of patient's allergies indicates:  No Known Allergies    Past Medical History:   Past Medical History:   Diagnosis Date    Cerebrovascular disease     CHF (congestive heart failure)     HLD (hyperlipidemia)     Hypertension     Lumbar herniated disc     Thrombus     LEFT VENTRICULAR       Procedure History: No past surgical history on file.    Family History: family history includes Diabetes in his mother; Heart attack in his maternal grandfather and paternal grandfather; Hyperlipidemia in his mother; Hypertension in his mother and sister; Lung cancer in his father; Schizophrenia in his father; Stroke in his mother and sister.    Social History:  reports that he has been smoking vaping w/o nicotine. He uses smokeless tobacco. He reports that he does not currently use alcohol. He reports that he does not use drugs.    Physical Exam:   /84 (BP  "Location: Right arm, Patient Position: Sitting, BP Method: Medium (Automatic))   Pulse (!) 51   Temp 97.7 °F (36.5 °C) (Oral)   Resp 20   Ht 5' 10" (1.778 m)   Wt 87 kg (191 lb 12.8 oz)   SpO2 99%   BMI 27.52 kg/m²     General appearance: Patient is in no acute distress.  Skin: No rashes or wounds.  HEENT: PERRLA, EOMI, no scleral icterus, no JVD. Neck is supple.  Chest: Respirations are unlabored. Lungs sounds are clear.   Heart: S1, S2.   Abdomen: Benign.  : Deferred.  Extremities: No edema, peripheral pulses are palpable.   Neuro: No focal deficits.     Home Medications:    Current Outpatient Medications:     aspirin 325 mg Cap, Take 325 mg by mouth once daily at 6am., Disp: , Rfl:     atorvastatin (LIPITOR) 40 MG tablet, Take 40 mg by mouth once daily., Disp: , Rfl:     carvediloL (COREG) 6.25 MG tablet, Take 6.25 mg by mouth 2 (two) times daily., Disp: , Rfl:     dicyclomine (BENTYL) 20 mg tablet, Take 1 tablet (20 mg total) by mouth 2 (two) times daily as needed (abdominal cramps)., Disp: 45 tablet, Rfl: 1    furosemide (LASIX) 40 MG tablet, Take 40 mg by mouth once daily., Disp: , Rfl:     glipiZIDE (GLUCOTROL) 2.5 MG TR24, Take 1 tablet (2.5 mg total) by mouth daily with breakfast., Disp: 90 tablet, Rfl: 2    hydrOXYzine pamoate (VISTARIL) 50 MG Cap, Take 50 mg by mouth daily as needed., Disp: , Rfl:     lisinopriL 10 MG tablet, Take 10 mg by mouth once daily., Disp: , Rfl:     omeprazole (PRILOSEC) 20 MG capsule, Take 1 capsule (20 mg total) by mouth every evening., Disp: 90 capsule, Rfl: 1    ondansetron (ZOFRAN-ODT) 4 MG TbDL, Take by mouth daily as needed for Nausea., Disp: , Rfl:     sucralfate (CARAFATE) 1 gram tablet, Take 1 tablet (1 g total) by mouth 3 (three) times daily., Disp: 180 tablet, Rfl: 1    warfarin (COUMADIN) 5 MG tablet, Oral at bedtime., Disp: 30 tablet, Rfl: 1     Laboratory Data:   Serum labs:  Lab Results   Component Value Date     09/09/2022    K 3.6 09/09/2022    " CHLORIDE 99 09/09/2022    CO2 27 09/09/2022    BUN 17.2 09/09/2022    CREATININE 1.29 (H) 09/09/2022    GLUCOSE 133 (H) 09/09/2022    CALCIUM 10.5 (H) 09/09/2022    ALBUMIN 4.5 09/09/2022    ALKPHOS 107 09/09/2022    ALT 47 09/09/2022    FHPXEJIF69MD 39.3 08/18/2022    HGBA1C 5.3 08/18/2022    HGB 14.6 09/09/2022    HCT 44.3 09/09/2022     09/09/2022    WBC 6.9 09/09/2022    IRON 68 08/18/2022    TIBC 232 (L) 08/18/2022    LABIRON 29 08/18/2022    TRANS 209 08/18/2022    FERRITIN 342.62 (H) 08/18/2022     Lab Results   Component Value Date    HEPCAB Nonreactive 08/18/2022    HEPBSURFAG Nonreactive 08/18/2022      Urine studies:  Lab Results   Component Value Date    COLORUA Light-Yellow (A) 09/09/2022    APPEARANCEUA Clear 09/09/2022    SGUA 1.009 09/09/2022    PHUA 8.0 09/09/2022    PROTEINUA Negative 09/09/2022    GLUCOSEUA Normal 09/09/2022    KETONESUA Negative 09/09/2022    BLOODUA Negative 09/09/2022    NITRITESUA Negative 09/09/2022    LEUKOCYTESUR Negative 09/09/2022    RBCUA 0-5 09/09/2022    WBCUA 0-5 09/09/2022    BACTERIA None Seen 09/09/2022    SQEPUA None Seen 09/09/2022    HYALINECASTS None Seen 09/09/2022    CREATRANDUR 58.4 (L) 09/09/2022    PROTEINURINE <6.8 09/09/2022       Imaging:  US RETROPERITONEAL LIMITED 08/01/2022  Right Kidney:  Length: 11 x 5.3 x 7.2 cm  Appearance: Normal echogenicity.  Collecting system: No hydronephrosis  Stones: Tiny echogenic foci measuring 3 mm x 2 mm x 2 mm likely representing a nonocclusive stone  Cyst/Mass: None  Left Kidney:  Length: 9 x 5.4 x 5.5 cm  Appearance: Normal echogenicity.  Collecting system: No hydronephrosis  Stones: None  Cyst/Mass: Small cyst is identified measuring 1.3 x 1.1 x 1.5 cm  Bladder:  Was not interrogated  Vessels:  Visualized portions of the IVC and aorta have a normal grayscale appearance.     Impression:  Small cyst of the left kidney.  Nonocclusive stone in the right kidney.  No other abnormalities     Impression and Plan      CKD stage G2/A1, GFR 60-89 and albumin creatinine ratio <30 mg/g  -     CBC Auto Differential; Future; Expected date: 09/05/2022  -     Comprehensive Metabolic Panel; Future; Expected date: 09/05/2022  -     Protein/Creatinine Ratio, Urine; Future; Expected date: 09/05/2022  -     Urinalysis, Reflex to Urine Culture Urine, Clean Catch; Future; Expected date: 09/05/2022  -     Comprehensive Metabolic Panel; Future; Expected date: 12/01/2022  -     Protein/Creatinine Ratio, Urine; Future; Expected date: 12/01/2022  -     Urinalysis, Reflex to Urine Culture Urine, Clean Catch; Future; Expected date: 12/01/2022  Estimated GFR is 71 mL/min.  There is no significant proteinuria.  Likely chronic tubular interstitial disease.  Continue risk factor management and KARIE blockade.    Renal ultrasound was unremarkable.    Return to clinic with routine labs in 3 months.      Tobacco abuse  Patient was counseled on importance of tobacco use cessation.  Strongly encouraged to quit, offered a referral to a smoking cessation program.    BMI 26.0-26.9,adult  Lifestyle and dietary interventions discussed, patient counseled on weight loss using portion control, non sedentary lifestyle, low-carbohydrate/low fat diet.    Other orders  -     cyproheptadine (PERIACTIN) 4 mg tablet; Take 1 tablet (4 mg total) by mouth every evening.  Dispense: 90 tablet; Refill: 0

## 2022-09-23 ENCOUNTER — PATIENT MESSAGE (OUTPATIENT)
Dept: INTERNAL MEDICINE | Facility: CLINIC | Age: 42
End: 2022-09-23
Payer: MEDICAID

## 2022-09-23 ENCOUNTER — CLINICAL SUPPORT (OUTPATIENT)
Dept: INTERNAL MEDICINE | Facility: CLINIC | Age: 42
End: 2022-09-23
Payer: MEDICAID

## 2022-09-23 DIAGNOSIS — I51.3 LEFT VENTRICULAR THROMBUS: Primary | ICD-10-CM

## 2022-09-23 DIAGNOSIS — Z86.79 HISTORY OF CONGESTIVE HEART FAILURE: ICD-10-CM

## 2022-09-23 PROCEDURE — 99211 OFF/OP EST MAY X REQ PHY/QHP: CPT | Mod: PBBFAC

## 2022-09-23 NOTE — PROGRESS NOTES
Poc Pt/Inr results 3.1/37.2 (Hx of Left VentricularTrombus, CHF normal Inr range 2.0-3.0). Inr results above normal range patient will increase vitamin k food items versus Warfarin dose change. Warfarin dose 5 mg. Patient weight 190 pounds. Return to clinic 10/11/22.

## 2022-09-26 DIAGNOSIS — F40.240 CLAUSTROPHOBIA: Primary | ICD-10-CM

## 2022-09-26 RX ORDER — CLONAZEPAM 1 MG/1
1 TABLET ORAL ONCE AS NEEDED
Qty: 1 TABLET | Refills: 0 | Status: SHIPPED | OUTPATIENT
Start: 2022-10-10 | End: 2022-11-30 | Stop reason: ALTCHOICE

## 2022-09-26 NOTE — PROGRESS NOTES
Clonazepam e-scripted to patient's pharmacy. To  on 10/10/2022. Take 30 mins-1 hour prior to MRI on 10/11/2022.

## 2022-10-11 ENCOUNTER — CLINICAL SUPPORT (OUTPATIENT)
Dept: INTERNAL MEDICINE | Facility: CLINIC | Age: 42
End: 2022-10-11
Payer: MEDICAID

## 2022-10-11 DIAGNOSIS — Z86.79 HISTORY OF CONGESTIVE HEART FAILURE: ICD-10-CM

## 2022-10-11 DIAGNOSIS — I51.3 LEFT VENTRICULAR THROMBUS: Primary | ICD-10-CM

## 2022-10-11 PROCEDURE — 99211 OFF/OP EST MAY X REQ PHY/QHP: CPT | Mod: PBBFAC

## 2022-10-11 NOTE — PROGRESS NOTES
Poc Pt/Inr results 2.6/31.7 (Hx of Left VentricularTrombus, CHF normal Inr range 2.0-3.0). Inr results within normal range. Warfarin dose 5 mg. Patient weight 190 pounds. Return to clinic 11/16/22.

## 2022-10-14 ENCOUNTER — TELEPHONE (OUTPATIENT)
Dept: INTERNAL MEDICINE | Facility: CLINIC | Age: 42
End: 2022-10-14
Payer: MEDICAID

## 2022-10-14 NOTE — TELEPHONE ENCOUNTER
Attempted to contact pt at number listed in chart, no answer. Unable to leave a VM due to VM being full

## 2022-10-14 NOTE — TELEPHONE ENCOUNTER
Please inform Jeff Sanchez Jr. of the following:  Abdominal MRI shows:    Smaller sized left kidney.  It also shows a cyst on the kidney that could be filled with either blood or protein. It is not cancerous and does not require any surgery.     For any questions, please let me know.  Thank you,    MARGUERITE Enriquez

## 2022-10-18 NOTE — TELEPHONE ENCOUNTER
Contact pt at number listed in the chart. Patient verbalized understanding. Pt did not verbalize any questions or concerns.

## 2022-10-20 ENCOUNTER — PATIENT MESSAGE (OUTPATIENT)
Dept: INTERNAL MEDICINE | Facility: CLINIC | Age: 42
End: 2022-10-20
Payer: MEDICAID

## 2022-11-16 ENCOUNTER — TELEPHONE (OUTPATIENT)
Dept: INTERNAL MEDICINE | Facility: CLINIC | Age: 42
End: 2022-11-16
Payer: MEDICAID

## 2022-11-16 NOTE — TELEPHONE ENCOUNTER
Was scheduled for Coumadin clinic today patient reports he was told to discontinue taking Warfarin a week ago and take a low dose aspirin instead. Discharge from Coumadin clinic no future visits needed unless Warfarin is resumed.

## 2022-11-29 RX ORDER — ASPIRIN 81 MG/1
81 TABLET ORAL DAILY
COMMUNITY
Start: 2022-11-08

## 2022-11-29 RX ORDER — WARFARIN 6 MG/1
6 TABLET ORAL DAILY
COMMUNITY
Start: 2022-09-15 | End: 2022-11-30 | Stop reason: ALTCHOICE

## 2022-11-29 RX ORDER — LISINOPRIL 40 MG/1
40 TABLET ORAL DAILY
COMMUNITY
Start: 2022-11-08 | End: 2023-02-20

## 2022-11-29 RX ORDER — LISINOPRIL 10 MG/1
10 TABLET ORAL DAILY
COMMUNITY
Start: 2022-10-13 | End: 2022-11-30 | Stop reason: ALTCHOICE

## 2022-11-30 ENCOUNTER — OFFICE VISIT (OUTPATIENT)
Dept: INTERNAL MEDICINE | Facility: CLINIC | Age: 42
End: 2022-11-30
Payer: MEDICAID

## 2022-11-30 VITALS
OXYGEN SATURATION: 97 % | BODY MASS INDEX: 26.2 KG/M2 | RESPIRATION RATE: 20 BRPM | TEMPERATURE: 98 F | WEIGHT: 183 LBS | HEART RATE: 62 BPM | DIASTOLIC BLOOD PRESSURE: 100 MMHG | SYSTOLIC BLOOD PRESSURE: 162 MMHG | HEIGHT: 70 IN

## 2022-11-30 DIAGNOSIS — F32.A ANXIETY AND DEPRESSION: Primary | ICD-10-CM

## 2022-11-30 DIAGNOSIS — F41.9 ANXIETY AND DEPRESSION: Primary | ICD-10-CM

## 2022-11-30 DIAGNOSIS — I10 PRIMARY HYPERTENSION: ICD-10-CM

## 2022-11-30 DIAGNOSIS — Z13.31 POSITIVE DEPRESSION SCREENING: ICD-10-CM

## 2022-11-30 PROCEDURE — 3066F NEPHROPATHY DOC TX: CPT | Mod: CPTII,,,

## 2022-11-30 PROCEDURE — 99215 OFFICE O/P EST HI 40 MIN: CPT | Mod: PBBFAC

## 2022-11-30 PROCEDURE — 3080F PR MOST RECENT DIASTOLIC BLOOD PRESSURE >= 90 MM HG: ICD-10-PCS | Mod: CPTII,,,

## 2022-11-30 PROCEDURE — 3060F PR POS MICROALBUMINURIA RESULT DOCUMENTED/REVIEW: ICD-10-PCS | Mod: CPTII,,,

## 2022-11-30 PROCEDURE — 3077F PR MOST RECENT SYSTOLIC BLOOD PRESSURE >= 140 MM HG: ICD-10-PCS | Mod: CPTII,,,

## 2022-11-30 PROCEDURE — 3080F DIAST BP >= 90 MM HG: CPT | Mod: CPTII,,,

## 2022-11-30 PROCEDURE — 3008F PR BODY MASS INDEX (BMI) DOCUMENTED: ICD-10-PCS | Mod: CPTII,,,

## 2022-11-30 PROCEDURE — 4010F ACE/ARB THERAPY RXD/TAKEN: CPT | Mod: CPTII,,,

## 2022-11-30 PROCEDURE — 4010F PR ACE/ARB THEARPY RXD/TAKEN: ICD-10-PCS | Mod: CPTII,,,

## 2022-11-30 PROCEDURE — 3060F POS MICROALBUMINURIA REV: CPT | Mod: CPTII,,,

## 2022-11-30 PROCEDURE — 99214 PR OFFICE/OUTPT VISIT, EST, LEVL IV, 30-39 MIN: ICD-10-PCS | Mod: S$PBB,,,

## 2022-11-30 PROCEDURE — 3008F BODY MASS INDEX DOCD: CPT | Mod: CPTII,,,

## 2022-11-30 PROCEDURE — 1159F PR MEDICATION LIST DOCUMENTED IN MEDICAL RECORD: ICD-10-PCS | Mod: CPTII,,,

## 2022-11-30 PROCEDURE — 1160F RVW MEDS BY RX/DR IN RCRD: CPT | Mod: CPTII,,,

## 2022-11-30 PROCEDURE — 3066F PR DOCUMENTATION OF TREATMENT FOR NEPHROPATHY: ICD-10-PCS | Mod: CPTII,,,

## 2022-11-30 PROCEDURE — 1159F MED LIST DOCD IN RCRD: CPT | Mod: CPTII,,,

## 2022-11-30 PROCEDURE — 1160F PR REVIEW ALL MEDS BY PRESCRIBER/CLIN PHARMACIST DOCUMENTED: ICD-10-PCS | Mod: CPTII,,,

## 2022-11-30 PROCEDURE — 3077F SYST BP >= 140 MM HG: CPT | Mod: CPTII,,,

## 2022-11-30 PROCEDURE — 99214 OFFICE O/P EST MOD 30 MIN: CPT | Mod: S$PBB,,,

## 2022-11-30 RX ORDER — HYDROXYZINE HYDROCHLORIDE 25 MG/1
25-50 TABLET, FILM COATED ORAL NIGHTLY PRN
Qty: 60 TABLET | Refills: 1 | Status: SHIPPED | OUTPATIENT
Start: 2022-11-30 | End: 2023-05-22

## 2022-11-30 RX ORDER — BUSPIRONE HYDROCHLORIDE 7.5 MG/1
7.5 TABLET ORAL 2 TIMES DAILY
Qty: 60 TABLET | Refills: 1 | Status: SHIPPED | OUTPATIENT
Start: 2022-11-30 | End: 2023-02-20

## 2022-11-30 NOTE — ASSESSMENT & PLAN NOTE
ERIK-7 Score: 21  Interpretation: Severe Anxiety   PHQ9 11/30/2022   Total Score 14   Denies SI/HI.  Rx buspirone and hydroxyzine.  RTC in 4 weeks for evaluation.  Take meds as prescribed.  Practice deep breathing or abdominal breathing exercises when anxiety occurs.  Exercise daily. Get sunlight daily.  Avoid caffeine, alcohol and stimulants.  Do not use illicit drugs.  Practice positive phrases and repeat throughout the day, yoga, lavender scents or Chamomile tea will help anxiety.  Set healthy boundaries, avoid people and conversations that increase stress.  Reports any symptoms of suicidal or homicidal ideations immediately, go to nearest emergency room.

## 2022-11-30 NOTE — ASSESSMENT & PLAN NOTE
"Vitals:    11/30/22 0750 11/30/22 0755   BP: (!) 155/91 (!) 162/100   Pulse: 62    Resp: 20    Temp: 97.9 °F (36.6 °C)    TempSrc: Oral    SpO2: 97%    Weight: 83 kg (182 lb 15.7 oz)    Height: 5' 10" (1.778 m)      Recheck BP at next office visit - anxious today.  Follow a low sodium (less than 2 grams of sodium per day), DASH diet.   Continue lisinopril as prescribed.  Monitor blood pressure and report any consistent values greater than 140/90 and keep a log.  Encouraged smoking cessation to aid in BP reduction and co-morbidities.   Maintain healthy weight with a BMI goal of <30.   Aerobic exercise for 150 minutes per week (or 5 days a week for 30 minutes each day).   "

## 2022-11-30 NOTE — PROGRESS NOTES
"    PATIENT NAME: Jeff Sanchez Jr.  : 1980  DATE: 22  MRN: [id number]          Reason for Visit/Chief Complaint   Anxiety and Depression       History of Present Illness (HPI)     Jeff Sanchez Jr. is a 42 y.o. male presenting in clinic today for Anxiety and Depression. PMH: CHF, left ventricular thrombus (currently on warfarin), and lumbar radiculopathy with laminectomy, decompression, fusion L4-5-S1. He is currently followed by [facility]. Patient's mother is present during visit. Family on mother and father's side has a strong cardiac history. Patient's younger sister has suffered a stroke previously.     PHQ9-14, GAD7-21. Denies SI/HI. States his father had schizophrenia. He says he has been "freaking out." He says he was relased to go back to work and when he fills out an application and has to stop. He states he was never treated for anxiety although he was told on many occasions that he had it. Anxiety is also associated with insomnia. I have used clinical judgement based on duration and functional status to consider definite necessity for treatment.     Patient states he vapes. Denies alcohol or illicit drug use. Denies chest pain, shortness of breath, cough, headache, dizziness, weakness, abdominal pain, nausea, vomiting, diarrhea, constipation, dysuria, SI, and HI.       Review of Systems     Review of Systems   Constitutional: Negative.    HENT: Negative.     Eyes: Negative.    Respiratory: Negative.     Cardiovascular: Negative.    Gastrointestinal: Negative.    Endocrine: Negative.    Genitourinary: Negative.    Musculoskeletal: Negative.    Skin: Negative.    Allergic/Immunologic: Negative.    Neurological: Negative.    Hematological: Negative.    Psychiatric/Behavioral:  Positive for dysphoric mood and sleep disturbance. Negative for self-injury and suicidal ideas. The patient is nervous/anxious.    All other systems reviewed and are negative.    Medical / Social / Family " History     Past Medical History:   Diagnosis Date    Cerebrovascular disease     CHF (congestive heart failure)     HLD (hyperlipidemia)     Hypertension     Lumbar herniated disc     Thrombus     LEFT VENTRICULAR         History reviewed. No pertinent surgical history.      Social History  Jeff Sanchez Jr.'s  reports that he has quit smoking. His smoking use included vaping w/o nicotine. He has quit using smokeless tobacco. He reports that he does not currently use alcohol. He reports that he does not use drugs.    Family History  Jeff Sanchez Jr.'s family history includes Diabetes in his mother; Heart attack in his maternal grandfather and paternal grandfather; Hyperlipidemia in his mother; Hypertension in his mother and sister; Lung cancer in his father; Schizophrenia in his father; Stroke in his mother and sister.    Medications and Allergies     Medications  Medication List with Changes/Refills   New Medications    BUSPIRONE (BUSPAR) 7.5 MG TABLET    Take 1 tablet (7.5 mg total) by mouth 2 (two) times a day.    HYDROXYZINE HCL (ATARAX) 25 MG TABLET    Take 1-2 tablets (25-50 mg total) by mouth nightly as needed for Anxiety.   Current Medications    ASPIRIN (ECOTRIN) 81 MG EC TABLET    Take 81 mg by mouth Daily.    ATORVASTATIN (LIPITOR) 40 MG TABLET    Take 40 mg by mouth once daily.    CARVEDILOL (COREG) 6.25 MG TABLET    Take 6.25 mg by mouth 2 (two) times daily.    LISINOPRIL (PRINIVIL,ZESTRIL) 40 MG TABLET    Take 40 mg by mouth Daily.   Discontinued Medications    CLONAZEPAM (KLONOPIN) 1 MG TABLET    Take 1 tablet (1 mg total) by mouth 1 (one) time if needed for Anxiety. Take 30 minutes to 1 hour prior to MRI.    CYPROHEPTADINE (PERIACTIN) 4 MG TABLET    Take 1 tablet (4 mg total) by mouth every evening.    DICYCLOMINE (BENTYL) 20 MG TABLET    Take 1 tablet (20 mg total) by mouth 2 (two) times daily as needed (abdominal cramps).    FUROSEMIDE (LASIX) 40 MG TABLET    Take 40 mg by mouth once daily.     GLIPIZIDE (GLUCOTROL) 2.5 MG TR24    Take 1 tablet (2.5 mg total) by mouth daily with breakfast.    LISINOPRIL (PRINIVIL,ZESTRIL) 20 MG TABLET    Take 20 mg by mouth once daily.    LISINOPRIL 10 MG TABLET    Take 10 mg by mouth Daily.    OMEPRAZOLE (PRILOSEC) 20 MG CAPSULE    Take 1 capsule (20 mg total) by mouth every evening.    WARFARIN (COUMADIN) 5 MG TABLET    Take 1 tablet oral at bedtime.    WARFARIN (COUMADIN) 6 MG TABLET    Take 6 mg by mouth Daily.         Allergies  Review of patient's allergies indicates:  No Known Allergies    Physical Examination     Vitals:    11/30/22 0755   BP: (!) 162/100   Pulse:    Resp:    Temp:        Physical Exam      Results     Lab Results   Component Value Date    WBC 6.9 09/09/2022    RBC 4.82 09/09/2022    HGB 14.6 09/09/2022    HCT 44.3 09/09/2022    MCV 91.9 09/09/2022    MCH 30.3 09/09/2022    MCHC 33.0 09/09/2022    RDW 13.3 09/09/2022     09/09/2022    MPV 11.8 (H) 09/09/2022     Lab Results   Component Value Date     09/09/2022    K 3.6 09/09/2022    CO2 27 09/09/2022    BUN 17.2 09/09/2022    CREATININE 1.29 (H) 09/09/2022    CALCIUM 10.5 (H) 09/09/2022    ALBUMIN 4.5 09/09/2022    BILITOT 0.9 09/09/2022    ALKPHOS 107 09/09/2022    AST 30 09/09/2022    ALT 47 09/09/2022    EGFRNONAA 49 05/26/2022     Lab Results   Component Value Date    TSH 0.4059 08/18/2022     Lab Results   Component Value Date    CHOL 151 08/18/2022    HDL 27 (L) 08/18/2022    LDL 71.00 08/18/2022    TRIG 266 (H) 08/18/2022     Lab Results   Component Value Date    APPEARANCEUA Clear 09/09/2022    LEUKOCYTESUR Negative 09/09/2022    RBCUA 0-5 09/09/2022    WBCUA 0-5 09/09/2022    BACTERIA None Seen 09/09/2022    HYALINECASTS None Seen 09/09/2022     Lab Results   Component Value Date    CREATRANDUR 58.4 (L) 09/09/2022     Lab Results   Component Value Date    LFBFVVDW36GM 39.3 08/18/2022     Lab Results   Component Value Date    HEPAIGM Nonreactive 08/18/2022    HEPBCOREM  "Nonreactive 08/18/2022    HEPCAB Nonreactive 08/18/2022     No results found for: ARSH HO  No results found for: JOSE LUIS        Assessment and Plan (including Health Maintenance)     Health Maintenance Due   Topic Date Due    COVID-19 Vaccine (1) Never done    HIV Screening  Never done       Problem List Items Addressed This Visit          Psychiatric    Anxiety and depression - Primary    Current Assessment & Plan     ERIK-7 Score: 21  Interpretation: Severe Anxiety   PHQ9 11/30/2022   Total Score 14   Denies SI/HI.  Rx buspirone and hydroxyzine.  RTC in 4 weeks for evaluation.  Take meds as prescribed.  Practice deep breathing or abdominal breathing exercises when anxiety occurs.  Exercise daily. Get sunlight daily.  Avoid caffeine, alcohol and stimulants.  Do not use illicit drugs.  Practice positive phrases and repeat throughout the day, yoga, lavender scents or Chamomile tea will help anxiety.  Set healthy boundaries, avoid people and conversations that increase stress.  Reports any symptoms of suicidal or homicidal ideations immediately, go to nearest emergency room.           Relevant Medications    busPIRone (BUSPAR) 7.5 MG tablet    hydrOXYzine HCL (ATARAX) 25 MG tablet       Cardiac/Vascular    Primary hypertension    Current Assessment & Plan     Vitals:    11/30/22 0750 11/30/22 0755   BP: (!) 155/91 (!) 162/100   Pulse: 62    Resp: 20    Temp: 97.9 °F (36.6 °C)    TempSrc: Oral    SpO2: 97%    Weight: 83 kg (182 lb 15.7 oz)    Height: 5' 10" (1.778 m)    Recheck BP at next office visit - anxious today.  Follow a low sodium (less than 2 grams of sodium per day), DASH diet.   Continue lisinopril as prescribed.  Monitor blood pressure and report any consistent values greater than 140/90 and keep a log.  Encouraged smoking cessation to aid in BP reduction and co-morbidities.   Maintain healthy weight with a BMI goal of <30.   Aerobic exercise for 150 minutes per week (or 5 days a week for 30 " minutes each day).             Endocrine    BMI 26.0-26.9,adult    Current Assessment & Plan     Body mass index is 26.26 kg/m². (At goal).              Other    Positive depression screening    Overview     I have reviewed the positive depression score which warrants active treatment with psychotherapy and/or medications.         Current Assessment & Plan     See anxiety and depression.            Health Maintenance Topics with due status: Not Due       Topic Last Completion Date    Lipid Panel 08/18/2022    High Dose Statin 11/30/2022       Future Appointments   Date Time Provider Department Center   12/14/2022 12:45 PM KELVIN Patel [facility]   12/29/2022  8:30 [facility]          Keep scheduled appt on 12/29/2022.  Labs within a week of visit.    Signature:        KELVIN Pineda  [facility]  [address] [facility] [id number]    Date of encounter: 11/30/22

## 2022-12-14 ENCOUNTER — DOCUMENTATION ONLY (OUTPATIENT)
Dept: NEPHROLOGY | Facility: CLINIC | Age: 42
End: 2022-12-14
Payer: MEDICAID

## 2022-12-14 NOTE — PROGRESS NOTES
Patient was no-show to clinic today. If patient calls back to reschedule, please schedule within 3-4 months.  Thank you

## 2022-12-21 ENCOUNTER — PATIENT MESSAGE (OUTPATIENT)
Dept: INTERNAL MEDICINE | Facility: CLINIC | Age: 42
End: 2022-12-21
Payer: MEDICAID

## 2022-12-29 DIAGNOSIS — E78.2 MIXED HYPERLIPIDEMIA: ICD-10-CM

## 2022-12-29 DIAGNOSIS — Z11.3 SCREEN FOR STD (SEXUALLY TRANSMITTED DISEASE): ICD-10-CM

## 2022-12-29 DIAGNOSIS — Z00.00 ENCOUNTER FOR WELLNESS EXAMINATION IN ADULT: ICD-10-CM

## 2022-12-29 DIAGNOSIS — Z13.21 ENCOUNTER FOR VITAMIN DEFICIENCY SCREENING: ICD-10-CM

## 2022-12-29 DIAGNOSIS — Z13.1 SCREENING FOR DIABETES MELLITUS (DM): ICD-10-CM

## 2022-12-29 DIAGNOSIS — Z13.29 SCREENING FOR THYROID DISORDER: ICD-10-CM

## 2022-12-29 DIAGNOSIS — I10 ESSENTIAL HYPERTENSION: Primary | ICD-10-CM

## 2023-02-08 ENCOUNTER — PATIENT MESSAGE (OUTPATIENT)
Dept: INTERNAL MEDICINE | Facility: CLINIC | Age: 43
End: 2023-02-08
Payer: MEDICAID

## 2023-02-20 ENCOUNTER — OFFICE VISIT (OUTPATIENT)
Dept: INTERNAL MEDICINE | Facility: CLINIC | Age: 43
End: 2023-02-20
Payer: MEDICAID

## 2023-02-20 VITALS
HEART RATE: 62 BPM | WEIGHT: 186.38 LBS | BODY MASS INDEX: 26.68 KG/M2 | TEMPERATURE: 98 F | HEIGHT: 70 IN | SYSTOLIC BLOOD PRESSURE: 138 MMHG | RESPIRATION RATE: 18 BRPM | DIASTOLIC BLOOD PRESSURE: 88 MMHG

## 2023-02-20 DIAGNOSIS — I10 ESSENTIAL HYPERTENSION: Primary | ICD-10-CM

## 2023-02-20 DIAGNOSIS — F32.A ANXIETY AND DEPRESSION: ICD-10-CM

## 2023-02-20 DIAGNOSIS — Z91.199 MEDICALLY NONCOMPLIANT: ICD-10-CM

## 2023-02-20 DIAGNOSIS — F41.9 ANXIETY AND DEPRESSION: ICD-10-CM

## 2023-02-20 DIAGNOSIS — R63.0 LOSS OF APPETITE: ICD-10-CM

## 2023-02-20 PROCEDURE — 3079F PR MOST RECENT DIASTOLIC BLOOD PRESSURE 80-89 MM HG: ICD-10-PCS | Mod: CPTII,,,

## 2023-02-20 PROCEDURE — 3079F DIAST BP 80-89 MM HG: CPT | Mod: CPTII,,,

## 2023-02-20 PROCEDURE — 99214 PR OFFICE/OUTPT VISIT, EST, LEVL IV, 30-39 MIN: ICD-10-PCS | Mod: S$PBB,,,

## 2023-02-20 PROCEDURE — 1159F MED LIST DOCD IN RCRD: CPT | Mod: CPTII,,,

## 2023-02-20 PROCEDURE — 1160F PR REVIEW ALL MEDS BY PRESCRIBER/CLIN PHARMACIST DOCUMENTED: ICD-10-PCS | Mod: CPTII,,,

## 2023-02-20 PROCEDURE — 4010F PR ACE/ARB THEARPY RXD/TAKEN: ICD-10-PCS | Mod: CPTII,,,

## 2023-02-20 PROCEDURE — 1160F RVW MEDS BY RX/DR IN RCRD: CPT | Mod: CPTII,,,

## 2023-02-20 PROCEDURE — 3008F BODY MASS INDEX DOCD: CPT | Mod: CPTII,,,

## 2023-02-20 PROCEDURE — 1159F PR MEDICATION LIST DOCUMENTED IN MEDICAL RECORD: ICD-10-PCS | Mod: CPTII,,,

## 2023-02-20 PROCEDURE — 99214 OFFICE O/P EST MOD 30 MIN: CPT | Mod: S$PBB,,,

## 2023-02-20 PROCEDURE — 3008F PR BODY MASS INDEX (BMI) DOCUMENTED: ICD-10-PCS | Mod: CPTII,,,

## 2023-02-20 PROCEDURE — 3075F SYST BP GE 130 - 139MM HG: CPT | Mod: CPTII,,,

## 2023-02-20 PROCEDURE — 4010F ACE/ARB THERAPY RXD/TAKEN: CPT | Mod: CPTII,,,

## 2023-02-20 PROCEDURE — 3075F PR MOST RECENT SYSTOLIC BLOOD PRESS GE 130-139MM HG: ICD-10-PCS | Mod: CPTII,,,

## 2023-02-20 PROCEDURE — 99215 OFFICE O/P EST HI 40 MIN: CPT | Mod: PBBFAC

## 2023-02-20 RX ORDER — BUSPIRONE HYDROCHLORIDE 7.5 MG/1
15 TABLET ORAL 2 TIMES DAILY
Qty: 60 TABLET | Refills: 1 | Status: SHIPPED | OUTPATIENT
Start: 2023-02-20 | End: 2023-05-22

## 2023-02-20 RX ORDER — MEGESTROL ACETATE 40 MG/1
40 TABLET ORAL DAILY
Qty: 90 TABLET | Refills: 1 | Status: SHIPPED | OUTPATIENT
Start: 2023-02-20 | End: 2023-05-22

## 2023-02-20 NOTE — PROGRESS NOTES
PATIENT NAME: Jeff Sanchez Jr.  : 1980  DATE: 23  MRN: [id number]          Reason for Visit/Chief Complaint   Fatigue and Anorexia       History of Present Illness (HPI)     Jeff Sanchez Jr. is a 42 y.o. White male presenting in clinic today for Fatigue and Anorexia. PMH: CHF, left ventricular thrombus (no longer on warfarin), and lumbar radiculopathy with laminectomy, decompression, fusion L4-5-S1. Of note, he is no longer taking warfarin, and is taking a low dose ASA. Patient's mother is present during visit. Family on mother and father's side has a strong cardiac history. Patient's younger sister has suffered a stroke previously.     He has not been taking buspirone or hydroxyzine for anxiety. He is very fidgety and speaking fast while in clinic. He is no longer taking atorvastatin, furosemide, lisinopril. He states the lisinopril 40 mg upsets his stomach, then he started to take 1/2 of it which some relief. He has not notified his cardiologist Dr. Garcia of this. At length discussion with pt regarding continued medical/medicine noncompliance, i.e. stroke, MI, DKA, loss of sight, limb, renal failure and possible death. Understanding voiced      He endorses not having an appetite even if he is hungry. He states he gets full fast. He has not lost any weight.    Patient states he vapes. Denies alcohol or illicit drug use. Denies chest pain, shortness of breath, cough, headache, dizziness, weakness, abdominal pain, nausea, vomiting, diarrhea, constipation, dysuria, SI, and HI.       Review of Systems     Review of Systems   Constitutional:  Positive for appetite change, fatigue and unexpected weight change.   HENT: Negative.     Eyes: Negative.    Respiratory: Negative.     Cardiovascular: Negative.    Gastrointestinal: Negative.    Endocrine: Negative.    Genitourinary: Negative.    Musculoskeletal: Negative.    Skin: Negative.    Allergic/Immunologic: Negative.    Neurological: Negative.   "  Hematological: Negative.    Psychiatric/Behavioral: Negative.     All other systems reviewed and are negative.    Medical / Social / Family History     Past Medical History:   Diagnosis Date    Cerebrovascular disease     CHF (congestive heart failure)     HLD (hyperlipidemia)     Hypertension     Lumbar herniated disc     Thrombus     LEFT VENTRICULAR         History reviewed. No pertinent surgical history.      Social History  Jeff Sanchez Jr.'s  reports that he has been smoking cigars and vaping with nicotine. He has quit using smokeless tobacco. He reports that he does not currently use alcohol. He reports that he does not use drugs.    Family History  Jeff Sanchez Jr.'s family history includes Diabetes in his mother; Heart attack in his maternal grandfather and paternal grandfather; Hyperlipidemia in his mother; Hypertension in his mother and sister; Lung cancer in his father; Schizophrenia in his father; Stroke in his mother and sister.    Medications and Allergies     Medications  Current Outpatient Medications   Medication Instructions    aspirin (ECOTRIN) 81 mg, Oral, Daily    atorvastatin (LIPITOR) 40 mg, Oral, Daily    busPIRone (BUSPAR) 15 mg, Oral, 2 times daily    carvediloL (COREG) 6.25 mg, Oral, 2 times daily    furosemide (LASIX) 40 mg, Oral, Daily    hydrOXYzine HCL (ATARAX) 25-50 mg, Oral, Nightly PRN    lisinopriL (PRINIVIL,ZESTRIL) 20 mg, Oral, Daily    megestroL (MEGACE) 40 mg, Oral, Daily       Allergies  Review of patient's allergies indicates:  No Known Allergies    Physical Examination   Visit Vitals  /88 (BP Location: Left arm, Patient Position: Sitting, BP Method: Large (Automatic))   Pulse 62   Temp 98.2 °F (36.8 °C) (Oral)   Resp 18   Ht 5' 10" (1.778 m)   Wt 84.6 kg (186 lb 6.4 oz)   BMI 26.75 kg/m²     Physical Exam  Vitals reviewed.   Constitutional:       Appearance: Normal appearance. He is normal weight.   HENT:      Head: Normocephalic.      Nose: Nose normal.      " Mouth/Throat:      Mouth: Mucous membranes are moist.      Pharynx: Oropharynx is clear.   Eyes:      Extraocular Movements: Extraocular movements intact.      Conjunctiva/sclera: Conjunctivae normal.      Pupils: Pupils are equal, round, and reactive to light.   Cardiovascular:      Rate and Rhythm: Normal rate and regular rhythm.      Heart sounds: Normal heart sounds.   Pulmonary:      Effort: Pulmonary effort is normal.      Breath sounds: Normal breath sounds.   Abdominal:      General: Abdomen is flat. Bowel sounds are normal.      Palpations: Abdomen is soft.   Musculoskeletal:         General: Normal range of motion.      Cervical back: Normal range of motion.   Skin:     General: Skin is warm and dry.      Capillary Refill: Capillary refill takes less than 2 seconds.   Neurological:      General: No focal deficit present.      Mental Status: He is alert and oriented to person, place, and time.   Psychiatric:         Mood and Affect: Mood is anxious.         Behavior: Behavior is agitated and hyperactive.         Results     Lab Results   Component Value Date    WBC 6.9 09/09/2022    RBC 4.82 09/09/2022    HGB 14.6 09/09/2022    HCT 44.3 09/09/2022    MCV 91.9 09/09/2022    MCH 30.3 09/09/2022    MCHC 33.0 09/09/2022    RDW 13.3 09/09/2022     09/09/2022    MPV 11.8 (H) 09/09/2022      Lab Results   Component Value Date     09/09/2022    K 3.6 09/09/2022    CHLORIDE 99 09/09/2022    CO2 27 09/09/2022    GLUCOSE 133 (H) 09/09/2022    BUN 17.2 09/09/2022    CREATININE 1.29 (H) 09/09/2022    LABPROT 8.1 09/09/2022    ALBUMIN 4.5 09/09/2022    BILITOT 0.9 09/09/2022    ALKPHOS 107 09/09/2022    AST 30 09/09/2022    ALT 47 09/09/2022    EGFRNORACEVR >60 09/09/2022     Lab Results   Component Value Date    TSH 0.4059 08/18/2022     Lab Results   Component Value Date    CHOL 151 08/18/2022    HDL 27 (L) 08/18/2022    LDL 71.00 08/18/2022    TRIG 266 (H) 08/18/2022     Lab Results   Component Value Date     COLORUA Light-Yellow (A) 09/09/2022    SGUA 1.009 09/09/2022    PROTEINUA Negative 09/09/2022    GLUCOSEUA Normal 09/09/2022    BILIRUBINUA Negative 09/09/2022    BLOODUA Negative 09/09/2022    WBCUA 0-5 09/09/2022    RBCUA 0-5 09/09/2022    BACTERIA None Seen 09/09/2022    NITRITESUA Negative 09/09/2022    LEUKOCYTESUR Negative 09/09/2022    UROBILINOGEN Normal 09/09/2022     Lab Results   Component Value Date    CREATRANDUR 58.4 (L) 09/09/2022    MICALBCREAT 31.2 (H) 05/26/2022     Lab Results   Component Value Date    OPTAGLGX13NQ 39.3 08/18/2022     Lab Results   Component Value Date    HEPAIGM Nonreactive 08/18/2022    HEPBCOREM Nonreactive 08/18/2022    HEPCAB Nonreactive 08/18/2022     Assessment and Plan (including Health Maintenance)     1. Essential hypertension  BP Readings from Last 3 Encounters:   02/20/23 138/88   11/30/22 (!) 162/100   09/14/22 137/84      At goal.  Follow a low sodium (less than 2 grams of sodium per day), DASH diet.   Continue ASA, carvedilol, furosemide, and lisinopril as prescribed.  Monitor blood pressure and report any consistent values greater than 140/90 and keep a log.  Encouraged smoking cessation to aid in BP reduction and co-morbidities.   Maintain healthy weight with a BMI goal of <30.   Aerobic exercise for 150 minutes per week (or 5 days a week for 30 minutes each day).   Notify Dr. Garcia of medications that have been self discontinued.    2. Loss of appetite  - megestroL (MEGACE) 40 MG Tab; Take 1 tablet (40 mg total) by mouth once daily.  Dispense: 90 tablet; Refill: 1  Rx megestrol.  Supplement meals with ensure or boost.  Weekly weight.     3. Anxiety and depression  - Ambulatory referral/consult to Behavioral Health; Future  - busPIRone (BUSPAR) 7.5 MG tablet; Take 2 tablets (15 mg total) by mouth 2 (two) times a day.  Dispense: 60 tablet; Refill: 1  Referral to Tere Brennan, PMHNP.  Increase buspirone to 15 mg twice daily.  Take meds as  prescribed.  Practice deep breathing or abdominal breathing exercises when anxiety occurs.  Exercise daily. Get sunlight daily.  Avoid caffeine, alcohol and stimulants.  Do not use illicit drugs.  Practice positive phrases and repeat throughout the day, yoga, lavender scents or Chamomile tea will help anxiety.  Set healthy boundaries, avoid people and conversations that increase stress.  Reports any symptoms of suicidal or homicidal ideations immediately, go to nearest emergency room.     4. Medically noncompliant  At length discussion with pt regarding continued medical/medicine noncompliance, i.e. stroke, MI, DKA, loss of sight, limb, renal failure and possible death.  Understanding voiced     5. BMI 26.0-26.9,adult  Body mass index is 26.75 kg/m². (At goal).     Health Maintenance Due   Topic Date Due    COVID-19 Vaccine (1) Never done    HIV Screening  Never done     All of your core healthy metrics are met.      Health Maintenance Topics with due status: Not Due       Topic Last Completion Date    Lipid Panel 08/18/2022    High Dose Statin 11/30/2022       Future Appointments   Date Time Provider Department Center   3/20/2023  1:00 PM KELVIN Pineda [facility]        Follow up in about 4 weeks (around 3/20/2023) for F2F, Anxiety, Lab review, Med check.  RTC PRN.  Labs within a week of visit.        Signature:        KELVIN Pineda  [facility]  [address] [facility] [id number]    Date of encounter: 2/20/23

## 2023-02-23 ENCOUNTER — PATIENT MESSAGE (OUTPATIENT)
Dept: INTERNAL MEDICINE | Facility: CLINIC | Age: 43
End: 2023-02-23
Payer: MEDICAID

## 2023-02-23 DIAGNOSIS — R63.0 LOSS OF APPETITE: Primary | ICD-10-CM

## 2023-02-23 RX ORDER — CYPROHEPTADINE HYDROCHLORIDE 4 MG/1
4 TABLET ORAL 2 TIMES DAILY
Qty: 90 TABLET | Refills: 1 | Status: SHIPPED | OUTPATIENT
Start: 2023-02-23 | End: 2023-05-22

## 2023-05-18 ENCOUNTER — PATIENT MESSAGE (OUTPATIENT)
Dept: INTERNAL MEDICINE | Facility: CLINIC | Age: 43
End: 2023-05-18
Payer: MEDICAID

## 2023-05-18 DIAGNOSIS — I50.9 CONGESTIVE HEART FAILURE, UNSPECIFIED HF CHRONICITY, UNSPECIFIED HEART FAILURE TYPE: ICD-10-CM

## 2023-05-18 DIAGNOSIS — I10 ESSENTIAL HYPERTENSION: Primary | ICD-10-CM

## 2023-05-19 DIAGNOSIS — Z13.29 THYROID DISORDER SCREEN: ICD-10-CM

## 2023-05-19 DIAGNOSIS — I10 PRIMARY HYPERTENSION: Primary | ICD-10-CM

## 2023-05-19 DIAGNOSIS — Z00.00 ENCOUNTER FOR WELLNESS EXAMINATION IN ADULT: ICD-10-CM

## 2023-05-19 DIAGNOSIS — Z13.1 SCREENING FOR DIABETES MELLITUS (DM): ICD-10-CM

## 2023-05-19 DIAGNOSIS — Z11.3 SCREEN FOR STD (SEXUALLY TRANSMITTED DISEASE): ICD-10-CM

## 2023-05-19 DIAGNOSIS — Z13.21 ENCOUNTER FOR VITAMIN DEFICIENCY SCREENING: ICD-10-CM

## 2023-05-19 DIAGNOSIS — E78.2 MIXED HYPERLIPIDEMIA: ICD-10-CM

## 2023-05-19 RX ORDER — LISINOPRIL 5 MG/1
5 TABLET ORAL DAILY
COMMUNITY
Start: 2023-04-25 | End: 2023-05-22

## 2023-05-22 ENCOUNTER — OFFICE VISIT (OUTPATIENT)
Dept: INTERNAL MEDICINE | Facility: CLINIC | Age: 43
End: 2023-05-22
Payer: MEDICAID

## 2023-05-22 VITALS
SYSTOLIC BLOOD PRESSURE: 144 MMHG | HEIGHT: 70 IN | DIASTOLIC BLOOD PRESSURE: 90 MMHG | TEMPERATURE: 98 F | WEIGHT: 188.25 LBS | OXYGEN SATURATION: 98 % | HEART RATE: 61 BPM | BODY MASS INDEX: 26.95 KG/M2 | RESPIRATION RATE: 20 BRPM

## 2023-05-22 DIAGNOSIS — I50.9 ACUTE ON CHRONIC CONGESTIVE HEART FAILURE, UNSPECIFIED HEART FAILURE TYPE: ICD-10-CM

## 2023-05-22 DIAGNOSIS — K64.8 INTERNAL HEMORRHOID: ICD-10-CM

## 2023-05-22 DIAGNOSIS — E78.2 MIXED HYPERLIPIDEMIA: ICD-10-CM

## 2023-05-22 DIAGNOSIS — Z91.199 MEDICALLY NONCOMPLIANT: ICD-10-CM

## 2023-05-22 DIAGNOSIS — F41.9 ANXIETY AND DEPRESSION: ICD-10-CM

## 2023-05-22 DIAGNOSIS — F32.A ANXIETY AND DEPRESSION: ICD-10-CM

## 2023-05-22 DIAGNOSIS — I50.9 CHRONIC CONGESTIVE HEART FAILURE, UNSPECIFIED HEART FAILURE TYPE: ICD-10-CM

## 2023-05-22 DIAGNOSIS — I10 PRIMARY HYPERTENSION: Primary | ICD-10-CM

## 2023-05-22 PROCEDURE — 4010F ACE/ARB THERAPY RXD/TAKEN: CPT | Mod: CPTII,,,

## 2023-05-22 PROCEDURE — 99214 OFFICE O/P EST MOD 30 MIN: CPT | Mod: S$PBB,,,

## 2023-05-22 PROCEDURE — 1159F MED LIST DOCD IN RCRD: CPT | Mod: CPTII,,,

## 2023-05-22 PROCEDURE — 1160F RVW MEDS BY RX/DR IN RCRD: CPT | Mod: CPTII,,,

## 2023-05-22 PROCEDURE — 3077F PR MOST RECENT SYSTOLIC BLOOD PRESSURE >= 140 MM HG: ICD-10-PCS | Mod: CPTII,,,

## 2023-05-22 PROCEDURE — 4010F PR ACE/ARB THEARPY RXD/TAKEN: ICD-10-PCS | Mod: CPTII,,,

## 2023-05-22 PROCEDURE — 3080F PR MOST RECENT DIASTOLIC BLOOD PRESSURE >= 90 MM HG: ICD-10-PCS | Mod: CPTII,,,

## 2023-05-22 PROCEDURE — 3008F BODY MASS INDEX DOCD: CPT | Mod: CPTII,,,

## 2023-05-22 PROCEDURE — 3008F PR BODY MASS INDEX (BMI) DOCUMENTED: ICD-10-PCS | Mod: CPTII,,,

## 2023-05-22 PROCEDURE — 3080F DIAST BP >= 90 MM HG: CPT | Mod: CPTII,,,

## 2023-05-22 PROCEDURE — 1159F PR MEDICATION LIST DOCUMENTED IN MEDICAL RECORD: ICD-10-PCS | Mod: CPTII,,,

## 2023-05-22 PROCEDURE — 99214 PR OFFICE/OUTPT VISIT, EST, LEVL IV, 30-39 MIN: ICD-10-PCS | Mod: S$PBB,,,

## 2023-05-22 PROCEDURE — 1160F PR REVIEW ALL MEDS BY PRESCRIBER/CLIN PHARMACIST DOCUMENTED: ICD-10-PCS | Mod: CPTII,,,

## 2023-05-22 PROCEDURE — 99215 OFFICE O/P EST HI 40 MIN: CPT | Mod: PBBFAC

## 2023-05-22 PROCEDURE — 3077F SYST BP >= 140 MM HG: CPT | Mod: CPTII,,,

## 2023-05-22 RX ORDER — ATORVASTATIN CALCIUM 40 MG/1
40 TABLET, FILM COATED ORAL NIGHTLY
Qty: 90 TABLET | Refills: 1 | Status: SHIPPED | OUTPATIENT
Start: 2023-05-22 | End: 2023-10-27 | Stop reason: SDUPTHER

## 2023-05-22 RX ORDER — LOSARTAN POTASSIUM 25 MG/1
25 TABLET ORAL DAILY
Qty: 90 TABLET | Refills: 1 | Status: SHIPPED | OUTPATIENT
Start: 2023-05-22 | End: 2023-06-05 | Stop reason: SDUPTHER

## 2023-05-22 RX ORDER — HYDROCORTISONE 25 MG/G
CREAM TOPICAL 2 TIMES DAILY
Qty: 28 G | Refills: 1 | Status: SHIPPED | OUTPATIENT
Start: 2023-05-22 | End: 2024-02-15

## 2023-05-22 RX ORDER — FUROSEMIDE 40 MG/1
40 TABLET ORAL DAILY PRN
Qty: 90 TABLET | Refills: 1 | Status: SHIPPED | OUTPATIENT
Start: 2023-05-22 | End: 2023-09-18

## 2023-05-22 NOTE — PROGRESS NOTES
PATIENT NAME: Jeff Sanchez Jr.  : 1980  DATE: 23  MRN: [id number]          Reason for Visit/Chief Complaint   Labs Only, Referral, and Hypertension       History of Present Illness (HPI)     Jeff Sanchez Jr. is a 42 y.o. White male presenting in clinic today for Labs Only, Referral, and Hypertension. PMH: CHF (EF less than 20% on 5/15/2022), left ventricular thrombus (no longer on warfarin), and lumbar radiculopathy with laminectomy, decompression, fusion L4-5-S1. Of note, he is no longer taking warfarin, and is taking a low dose ASA. Patient's mother is present during visit. Family on mother and father's side has a strong cardiac history. Patient's younger sister has suffered a stroke previously.     BP: 144/90 - not at goal. He stopped taking lisinopril a couple of weeks ag. He states Dr. Garcia decreased the amount of Lisinopril form 40 mg to 5 mg and he still felt drained and without energy. Patient sent a message through the portal requesting a new cardiologist. Referral was placed on 2023.    He reports internal hemorrhoids with discomfort with defecation. He denies constipation, but states he will periodically have hard stools. Denies blood in stool.    Patient states he vapes. Denies alcohol or illicit drug use. Denies chest pain, shortness of breath, cough, headache, dizziness, weakness, abdominal pain, nausea, vomiting, diarrhea, constipation, dysuria, SI, and HI.       Review of Systems     Review of Systems   Constitutional:  Positive for appetite change and fatigue.   HENT: Negative.     Eyes: Negative.    Respiratory: Negative.     Cardiovascular: Negative.    Gastrointestinal: Negative.    Endocrine: Negative.    Genitourinary: Negative.    Musculoskeletal: Negative.    Skin: Negative.    Allergic/Immunologic: Negative.    Neurological: Negative.    Hematological: Negative.    Psychiatric/Behavioral: Negative.     All other systems reviewed and are negative.    Medical / Social /  "Family History     Past Medical History:   Diagnosis Date    Cerebrovascular disease     CHF (congestive heart failure)     HLD (hyperlipidemia)     Hypertension     Lumbar herniated disc     Thrombus     LEFT VENTRICULAR         History reviewed. No pertinent surgical history.      Social History  Jeff Sanchez Jr.'s  reports that he has been smoking cigars and vaping with nicotine. He has quit using smokeless tobacco. He reports that he does not currently use alcohol. He reports that he does not use drugs.    Family History  Jeff Sanchez Jr.'s family history includes Diabetes in his mother; Heart attack in his maternal grandfather and paternal grandfather; Hyperlipidemia in his mother; Hypertension in his mother and sister; Lung cancer in his father; Schizophrenia in his father; Stroke in his mother and sister.    Medications and Allergies     Medications  Current Outpatient Medications   Medication Instructions    aspirin (ECOTRIN) 81 mg, Oral, Daily    atorvastatin (LIPITOR) 40 mg, Oral, Nightly    carvediloL (COREG) 6.25 mg, Oral, 2 times daily    furosemide (LASIX) 40 mg, Oral, Daily PRN    hydrocortisone (ANUSOL-HC) 2.5 % rectal cream Rectal, 2 times daily    linaCLOtide (LINZESS) 72 mcg, Oral, Daily PRN    losartan (COZAAR) 25 mg, Oral, Daily       Allergies  Review of patient's allergies indicates:  No Known Allergies    Physical Examination   Visit Vitals  BP (!) 144/90 (BP Location: Left arm, Patient Position: Sitting, BP Method: Large (Manual))   Pulse 61   Temp 97.9 °F (36.6 °C) (Oral)   Resp 20   Ht 5' 10" (1.778 m)   Wt 85.4 kg (188 lb 4.4 oz)   SpO2 98%   BMI 27.01 kg/m²       Physical Exam  Vitals reviewed.   Constitutional:       Appearance: Normal appearance. He is normal weight.   HENT:      Head: Normocephalic.      Nose: Nose normal.      Mouth/Throat:      Mouth: Mucous membranes are moist.      Pharynx: Oropharynx is clear.   Eyes:      Extraocular Movements: Extraocular movements intact. "      Conjunctiva/sclera: Conjunctivae normal.      Pupils: Pupils are equal, round, and reactive to light.   Cardiovascular:      Rate and Rhythm: Normal rate and regular rhythm.      Heart sounds: Normal heart sounds.   Pulmonary:      Effort: Pulmonary effort is normal.      Breath sounds: Normal breath sounds.   Abdominal:      General: Abdomen is flat. Bowel sounds are normal.      Palpations: Abdomen is soft.   Musculoskeletal:         General: Normal range of motion.      Cervical back: Normal range of motion.   Skin:     General: Skin is warm and dry.      Capillary Refill: Capillary refill takes less than 2 seconds.   Neurological:      General: No focal deficit present.      Mental Status: He is alert and oriented to person, place, and time.   Psychiatric:         Mood and Affect: Mood is anxious.         Behavior: Behavior is agitated and hyperactive.         Results     Lab Results   Component Value Date    WBC 10.29 05/22/2023    RBC 5.37 05/22/2023    HGB 15.5 05/22/2023    HCT 47.3 05/22/2023    MCV 88.1 05/22/2023    MCH 28.9 05/22/2023    MCHC 32.8 (L) 05/22/2023    RDW 13.6 05/22/2023     05/22/2023    MPV 10.4 05/22/2023      Lab Results   Component Value Date     05/22/2023    K 4.0 05/22/2023    CHLORIDE 106 05/22/2023    CO2 28 05/22/2023    GLUCOSE 115 (H) 05/22/2023    BUN 11.4 05/22/2023    CREATININE 1.20 (H) 05/22/2023    LABPROT 7.6 05/22/2023    ALBUMIN 4.2 05/22/2023    BILITOT 0.5 05/22/2023    ALKPHOS 90 05/22/2023    AST 17 05/22/2023    ALT 19 05/22/2023    EGFRNORACEVR >60 05/22/2023     Lab Results   Component Value Date    TSH 0.623 05/22/2023     Lab Results   Component Value Date    CHOL 221 (H) 05/22/2023    HDL 38 05/22/2023    .00 05/22/2023    TRIG 277 (H) 05/22/2023     Lab Results   Component Value Date    COLORUA Light-Yellow (A) 09/09/2022    SGUA 1.009 09/09/2022    PROTEINUA Negative 09/09/2022    GLUCOSEUA Normal 09/09/2022    BILIRUBINUA Negative  09/09/2022    BLOODUA Negative 09/09/2022    WBCUA 0-5 09/09/2022    RBCUA 0-5 09/09/2022    BACTERIA None Seen 09/09/2022    NITRITESUA Negative 09/09/2022    LEUKOCYTESUR Negative 09/09/2022    UROBILINOGEN Normal 09/09/2022     Lab Results   Component Value Date    CREATRANDUR 58.4 (L) 09/09/2022    MICALBCREAT 31.2 (H) 05/26/2022     Lab Results   Component Value Date    JGLNTGYD24MK 38.2 05/22/2023     Lab Results   Component Value Date    HIV Nonreactive 05/22/2023    HEPAIGM Nonreactive 05/22/2023    HEPBCOREM Nonreactive 05/22/2023    HEPCAB Nonreactive 05/22/2023     Assessment and Plan (including Health Maintenance)     Problem List Items Addressed This Visit          Psychiatric    Anxiety and depression    Current Assessment & Plan     Self discontinued buspirone and hydroxyzine.  Denies SI/HI.  Practice deep breathing or abdominal breathing exercises when anxiety occurs.  Exercise daily. Get sunlight daily.  Avoid caffeine, alcohol and stimulants.  Do not use illicit drugs.  Practice positive phrases and repeat throughout the day, yoga, lavender scents or Chamomile tea will help anxiety.  Set healthy boundaries, avoid people and conversations that increase stress.  Reports any symptoms of suicidal or homicidal ideations immediately, go to nearest emergency room.             Cardiac/Vascular    Congestive heart failure    Current Assessment & Plan     Awaiting appt with [facility] Cardiology clinic.  Continue atorvastatin, carvedilol, furosemide, and losartan .   Keep Cardiology Clinic appts as scheduled.  Last ECHO showed EF of less than 20%.  Notify the clinic if you gain 3 or more pounds in one day or 5 or more pounds in one week.  Stressed importance of daily morning weights after urination but prior to breakfast on the same scale.  Low Sodium Diet (Dash Diet - less than 2 grams of sodium per day).  Follow a low cholesterol, low saturated fat diet with less that 200mg of cholesterol a day.  Cut down on  "alcohol if you have more than 1 drink a day (for women) or 2 drinks a day (for men).  Maintain healthy weight with goal BMI <30. Perform 30 minutes of daily physical activity 5 days per week.  Report to ER for chest pain, SOB, difficulty breathing, abdominal distention or significant edema to lower extremities.              Relevant Orders    Magnesium    Mixed hyperlipidemia    Current Assessment & Plan     Lab Results   Component Value Date    .00 05/22/2023       Lab Results   Component Value Date    TRIG 277 (H) 05/22/2023       Lab Results   Component Value Date    HDL 38 05/22/2023      Lab Results   Component Value Date    CHOL 221 (H) 05/22/2023      Resume atorvastatin as prescribed - refilled today.  Follow a low cholesterol, low saturated fat diet with less than 200 mg of cholesterol a day.   Avoid fried foods and high saturated fats.  Add flax seed or fish oil supplements to diet.   Increase dietary fiber.   Regular exercise improves cholesterol levels.  Physical activity 5 times a week for 30 minutes per day (or 150 minutes per week).   Stressed importance of dietary modifications.          Relevant Medications    atorvastatin (LIPITOR) 40 MG tablet    Other Relevant Orders    Lipid Panel    CHF exacerbation    Primary hypertension - Primary    Current Assessment & Plan     BP Readings from Last 3 Encounters:   05/22/23 (!) 144/90   02/20/23 138/88   11/30/22 (!) 162/100      Not at goal.  Follow a low sodium (less than 2 grams of sodium per day), DASH diet.   Self discontinued lisinopril "it does not like the way it makes me feel." Ok to discontinue.  Rx losartan.  RTC in 2 weeks for a BP check.  Monitor blood pressure and report any consistent values greater than 140/90 and keep a log.  Encouraged vaping/smoking cessation to aid in BP reduction and co-morbidities.   Maintain healthy weight with a BMI goal of <30.   Aerobic exercise for 150 minutes per week (or 5 days a week for 30 minutes each " day).          Relevant Medications    furosemide (LASIX) 40 MG tablet    losartan (COZAAR) 25 MG tablet    Other Relevant Orders    CBC Auto Differential    Comprehensive Metabolic Panel    Microalbumin/Creatinine Ratio, Urine    Urinalysis, Reflex to Urine Culture       Endocrine    BMI 27.0-27.9,adult    Current Assessment & Plan     Body mass index is 27.01 kg/m². (At goal).               GI    Internal hemorrhoid    Current Assessment & Plan     RX Hydrocortisone (anusol HC).  Take a warm bath or sitz bath a few times per day for 10 minutes at a time.  Use ice pack to reduce swelling.  Increase water intake.  Notify clinic if bleeding occurs.            Relevant Medications    hydrocortisone (ANUSOL-HC) 2.5 % rectal cream    linaCLOtide (LINZESS) 72 mcg Cap capsule       Palliative Care    Medically noncompliant    Current Assessment & Plan     At length discussion with pt regarding continued medical/medicine noncompliance, i.e. stroke, MI, DKA, loss of sight, limb, renal failure and possible death.  Understanding voiced              Health Maintenance Due   Topic Date Due    COVID-19 Vaccine (1) Never done     Tests to Keep You Healthy    Last Blood Pressure <= 139/89 (5/22/2023): NO      Health Maintenance Topics with due status: Not Due       Topic Last Completion Date    High Dose Statin 05/22/2023    Lipid Panel 05/22/2023    Influenza Vaccine Not Due       Future Appointments   Date Time Provider Department Center   6/5/2023  7:45 AM KELVIN Pineda [facility]          Follow up in about 2 weeks (around 6/5/2023) for F2F, Follow up, Med check, HTN.  RTC PRN.          Signature:        KELVIN Pineda  [facility]  [address] [id number]    Date of encounter: 5/22/23

## 2023-05-23 PROBLEM — Z79.01 WARFARIN ANTICOAGULATION: Status: RESOLVED | Noted: 2022-09-14 | Resolved: 2023-05-23

## 2023-05-23 NOTE — ASSESSMENT & PLAN NOTE
At length discussion with pt regarding continued medical/medicine noncompliance, i.e. stroke, MI, DKA, loss of sight, limb, renal failure and possible death.  Understanding voiced

## 2023-05-23 NOTE — ASSESSMENT & PLAN NOTE
RX Hydrocortisone (anusol HC).  Take a warm bath or sitz bath a few times per day for 10 minutes at a time.  Use ice pack to reduce swelling.  Increase water intake.  Notify clinic if bleeding occurs.

## 2023-05-23 NOTE — ASSESSMENT & PLAN NOTE
Awaiting appt with [facility] Cardiology clinic.  Continue atorvastatin, carvedilol, furosemide, and losartan .   Keep Cardiology Clinic appts as scheduled.  Last ECHO showed EF of less than 20%.  Notify the clinic if you gain 3 or more pounds in one day or 5 or more pounds in one week.  Stressed importance of daily morning weights after urination but prior to breakfast on the same scale.  Low Sodium Diet (Dash Diet - less than 2 grams of sodium per day).  Follow a low cholesterol, low saturated fat diet with less that 200mg of cholesterol a day.  Cut down on alcohol if you have more than 1 drink a day (for women) or 2 drinks a day (for men).  Maintain healthy weight with goal BMI <30. Perform 30 minutes of daily physical activity 5 days per week.  Report to ER for chest pain, SOB, difficulty breathing, abdominal distention or significant edema to lower extremities.

## 2023-05-23 NOTE — ASSESSMENT & PLAN NOTE
Lab Results   Component Value Date    .00 05/22/2023       Lab Results   Component Value Date    TRIG 277 (H) 05/22/2023       Lab Results   Component Value Date    HDL 38 05/22/2023        Lab Results   Component Value Date    CHOL 221 (H) 05/22/2023      Resume atorvastatin as prescribed - refilled today.  Follow a low cholesterol, low saturated fat diet with less than 200 mg of cholesterol a day.   Avoid fried foods and high saturated fats.  Add flax seed or fish oil supplements to diet.   Increase dietary fiber.   Regular exercise improves cholesterol levels.  Physical activity 5 times a week for 30 minutes per day (or 150 minutes per week).   Stressed importance of dietary modifications.

## 2023-05-23 NOTE — ASSESSMENT & PLAN NOTE
Self discontinued buspirone and hydroxyzine.  Denies SI/HI.  Practice deep breathing or abdominal breathing exercises when anxiety occurs.  Exercise daily. Get sunlight daily.  Avoid caffeine, alcohol and stimulants.  Do not use illicit drugs.  Practice positive phrases and repeat throughout the day, yoga, lavender scents or Chamomile tea will help anxiety.  Set healthy boundaries, avoid people and conversations that increase stress.  Reports any symptoms of suicidal or homicidal ideations immediately, go to nearest emergency room.

## 2023-05-23 NOTE — ASSESSMENT & PLAN NOTE
Awaiting appt with [facility] Cardiology clinic.  Continue atorvastatin, carvedilol, furosemide, and losartan .   Keep Cardiology Clinic appts as scheduled.  Last ECHO showed EF of less than 20%.  Notify the clinic if you gain 3 or more pounds in one day or 5 or more pounds in one week.  Stressed importance of daily morning weights after urination but prior to breakfast on the same scale.  Low Sodium Diet (Dash Diet - less than 2 grams of sodium per day).  Follow a low cholesterol, low saturated fat diet with less that 200mg of cholesterol a day.  Cut down on alcohol if you have more than 1 drink a day (for women) or 2 drinks a day (for men).  Maintain healthy weight with goal BMI <30. Perform 30 minutes of daily physical activity 5 days per week.  Report to ER for chest pain, SOB, difficulty breathing, abdominal distention or significant edema to lower extremities.

## 2023-05-23 NOTE — ASSESSMENT & PLAN NOTE
"BP Readings from Last 3 Encounters:   05/22/23 (!) 144/90   02/20/23 138/88   11/30/22 (!) 162/100      Not at goal.  Follow a low sodium (less than 2 grams of sodium per day), DASH diet.   Self discontinued lisinopril "it does not like the way it makes me feel." Ok to discontinue.  Rx losartan.  RTC in 2 weeks for a BP check.  Monitor blood pressure and report any consistent values greater than 140/90 and keep a log.  Encouraged vaping/smoking cessation to aid in BP reduction and co-morbidities.   Maintain healthy weight with a BMI goal of <30.   Aerobic exercise for 150 minutes per week (or 5 days a week for 30 minutes each day).   "

## 2023-06-05 ENCOUNTER — OFFICE VISIT (OUTPATIENT)
Dept: INTERNAL MEDICINE | Facility: CLINIC | Age: 43
End: 2023-06-05
Payer: MEDICAID

## 2023-06-05 VITALS
TEMPERATURE: 98 F | OXYGEN SATURATION: 99 % | BODY MASS INDEX: 27.94 KG/M2 | DIASTOLIC BLOOD PRESSURE: 100 MMHG | SYSTOLIC BLOOD PRESSURE: 160 MMHG | HEIGHT: 70 IN | RESPIRATION RATE: 18 BRPM | HEART RATE: 51 BPM | WEIGHT: 195.13 LBS

## 2023-06-05 DIAGNOSIS — I10 PRIMARY HYPERTENSION: Primary | ICD-10-CM

## 2023-06-05 PROCEDURE — 1160F PR REVIEW ALL MEDS BY PRESCRIBER/CLIN PHARMACIST DOCUMENTED: ICD-10-PCS | Mod: CPTII,,,

## 2023-06-05 PROCEDURE — 3080F DIAST BP >= 90 MM HG: CPT | Mod: CPTII,,,

## 2023-06-05 PROCEDURE — 3077F SYST BP >= 140 MM HG: CPT | Mod: CPTII,,,

## 2023-06-05 PROCEDURE — 1159F MED LIST DOCD IN RCRD: CPT | Mod: CPTII,,,

## 2023-06-05 PROCEDURE — 3077F PR MOST RECENT SYSTOLIC BLOOD PRESSURE >= 140 MM HG: ICD-10-PCS | Mod: CPTII,,,

## 2023-06-05 PROCEDURE — 1159F PR MEDICATION LIST DOCUMENTED IN MEDICAL RECORD: ICD-10-PCS | Mod: CPTII,,,

## 2023-06-05 PROCEDURE — 4010F PR ACE/ARB THEARPY RXD/TAKEN: ICD-10-PCS | Mod: CPTII,,,

## 2023-06-05 PROCEDURE — 99214 PR OFFICE/OUTPT VISIT, EST, LEVL IV, 30-39 MIN: ICD-10-PCS | Mod: S$PBB,,,

## 2023-06-05 PROCEDURE — 3008F PR BODY MASS INDEX (BMI) DOCUMENTED: ICD-10-PCS | Mod: CPTII,,,

## 2023-06-05 PROCEDURE — 99215 OFFICE O/P EST HI 40 MIN: CPT | Mod: PBBFAC

## 2023-06-05 PROCEDURE — 3080F PR MOST RECENT DIASTOLIC BLOOD PRESSURE >= 90 MM HG: ICD-10-PCS | Mod: CPTII,,,

## 2023-06-05 PROCEDURE — 99214 OFFICE O/P EST MOD 30 MIN: CPT | Mod: S$PBB,,,

## 2023-06-05 PROCEDURE — 3008F BODY MASS INDEX DOCD: CPT | Mod: CPTII,,,

## 2023-06-05 PROCEDURE — 4010F ACE/ARB THERAPY RXD/TAKEN: CPT | Mod: CPTII,,,

## 2023-06-05 PROCEDURE — 1160F RVW MEDS BY RX/DR IN RCRD: CPT | Mod: CPTII,,,

## 2023-06-05 RX ORDER — METOPROLOL SUCCINATE 25 MG/1
25 TABLET, EXTENDED RELEASE ORAL DAILY
Qty: 60 TABLET | Refills: 1 | Status: SHIPPED | OUTPATIENT
Start: 2023-06-05 | End: 2023-07-06 | Stop reason: SINTOL

## 2023-06-05 RX ORDER — LOSARTAN POTASSIUM 50 MG/1
50 TABLET ORAL DAILY
Qty: 90 TABLET | Refills: 1 | Status: SHIPPED | OUTPATIENT
Start: 2023-06-05 | End: 2023-07-06

## 2023-06-05 NOTE — ASSESSMENT & PLAN NOTE
BP Readings from Last 3 Encounters:   06/05/23 (!) 160/100   05/22/23 (!) 144/90   02/20/23 138/88      Follow a low sodium (less than 2 grams of sodium per day), DASH diet.   D/c carvedilol.  Rx metoprolol succinate.  Increase losartan to 50 mg daily.  RTC in 4 weeks.  Monitor blood pressure and report any consistent values greater than 140/90 and keep a log.  Encouraged vaping cessation to aid in BP reduction and co-morbidities.   Maintain healthy weight with a BMI goal of <30.   Aerobic exercise for 150 minutes per week (or 5 days a week for 30 minutes each day).

## 2023-06-05 NOTE — PROGRESS NOTES
PATIENT NAME: Jeff Sanchez Jr.  : 1980  DATE: 23  MRN: [id number]          Reason for Visit/Chief Complaint   Follow-up and Hypertension       History of Present Illness (HPI)     Jeff Sanchez Jr. is a 42 y.o. White male presenting in clinic today for Follow-up and Hypertension. PMH: CHF (EF less than 20% on 5/15/2022), left ventricular thrombus (no longer on warfarin), and lumbar radiculopathy with laminectomy, decompression, fusion L4-5-S1. Of note, he is no longer taking warfarin, and is taking a low dose ASA. Patient's mother is present during visit. Family on mother and father's side has a strong cardiac history. Patient's younger sister has suffered a stroke previously.     BP: 160/100 - not at goal. Denies CP, SOB, HA, dizziness, or vision changes. He presented a BP log in clinic that ranges from 132//100 in the morning and 125//92 at night. He voices compliance with all medications. He is scheduled to establish care with [facility] cardiology on 8/3/2023.    Patient states he vapes. Denies alcohol or illicit drug use. Denies chest pain, shortness of breath, cough, headache, dizziness, weakness, abdominal pain, nausea, vomiting, diarrhea, constipation, dysuria, SI, and HI.       Review of Systems     Review of Systems   Constitutional: Negative.    HENT: Negative.     Eyes: Negative.    Respiratory: Negative.  Negative for chest tightness and shortness of breath.    Cardiovascular: Negative.    Gastrointestinal: Negative.    Endocrine: Negative.    Genitourinary: Negative.    Musculoskeletal: Negative.    Skin: Negative.    Allergic/Immunologic: Negative.    Neurological: Negative.    Hematological: Negative.    Psychiatric/Behavioral: Negative.     All other systems reviewed and are negative.    Medical / Social / Family History     Past Medical History:   Diagnosis Date    Cerebrovascular disease     CHF (congestive heart failure)     HLD (hyperlipidemia)     Hypertension     Lumbar  "herniated disc     Thrombus     LEFT VENTRICULAR         History reviewed. No pertinent surgical history.      Social History  Jeff Sanchez Jr.'s  reports that he has been smoking cigars and vaping with nicotine. He has quit using smokeless tobacco. He reports that he does not currently use alcohol. He reports that he does not use drugs.    Family History  Jeff Sanchez Jr.'s family history includes Diabetes in his mother; Heart attack in his maternal grandfather and paternal grandfather; Hyperlipidemia in his mother; Hypertension in his mother and sister; Lung cancer in his father; Schizophrenia in his father; Stroke in his mother and sister.    Medications and Allergies     Medications  Current Outpatient Medications   Medication Instructions    aspirin (ECOTRIN) 81 mg, Oral, Daily    atorvastatin (LIPITOR) 40 mg, Oral, Nightly    carvediloL (COREG) 6.25 mg, Oral, 2 times daily    furosemide (LASIX) 40 mg, Oral, Daily PRN    hydrocortisone (ANUSOL-HC) 2.5 % rectal cream Rectal, 2 times daily    linaCLOtide (LINZESS) 72 mcg, Oral, Daily PRN    losartan (COZAAR) 25 mg, Oral, Daily       Allergies  Review of patient's allergies indicates:  No Known Allergies    Physical Examination   Visit Vitals  BP (!) 160/100 (BP Location: Left arm, Patient Position: Sitting, BP Method: Large (Manual))   Pulse (!) 51   Temp 97.6 °F (36.4 °C) (Oral)   Resp 18   Ht 5' 10" (1.778 m)   Wt 88.5 kg (195 lb 1.7 oz)   SpO2 99%   BMI 27.99 kg/m²         Physical Exam  Vitals reviewed.   Constitutional:       Appearance: Normal appearance. He is normal weight.   HENT:      Head: Normocephalic.      Nose: Nose normal.      Mouth/Throat:      Mouth: Mucous membranes are moist.      Pharynx: Oropharynx is clear.   Eyes:      Extraocular Movements: Extraocular movements intact.      Conjunctiva/sclera: Conjunctivae normal.      Pupils: Pupils are equal, round, and reactive to light.   Cardiovascular:      Rate and Rhythm: Normal rate and " regular rhythm.      Heart sounds: Normal heart sounds.   Pulmonary:      Effort: Pulmonary effort is normal.      Breath sounds: Normal breath sounds.   Abdominal:      General: Abdomen is flat. Bowel sounds are normal.      Palpations: Abdomen is soft.   Musculoskeletal:         General: Normal range of motion.      Cervical back: Normal range of motion.   Skin:     General: Skin is warm and dry.      Capillary Refill: Capillary refill takes less than 2 seconds.   Neurological:      General: No focal deficit present.      Mental Status: He is alert and oriented to person, place, and time.   Psychiatric:         Mood and Affect: Mood normal.         Behavior: Behavior normal. Behavior is not agitated or hyperactive. Behavior is cooperative.         Results     Lab Results   Component Value Date    WBC 10.29 05/22/2023    RBC 5.37 05/22/2023    HGB 15.5 05/22/2023    HCT 47.3 05/22/2023    MCV 88.1 05/22/2023    MCH 28.9 05/22/2023    MCHC 32.8 (L) 05/22/2023    RDW 13.6 05/22/2023     05/22/2023    MPV 10.4 05/22/2023      Lab Results   Component Value Date     05/22/2023    K 4.0 05/22/2023    CHLORIDE 106 05/22/2023    CO2 28 05/22/2023    GLUCOSE 115 (H) 05/22/2023    BUN 11.4 05/22/2023    CREATININE 1.20 (H) 05/22/2023    LABPROT 7.6 05/22/2023    ALBUMIN 4.2 05/22/2023    BILITOT 0.5 05/22/2023    ALKPHOS 90 05/22/2023    AST 17 05/22/2023    ALT 19 05/22/2023    EGFRNORACEVR >60 05/22/2023     Lab Results   Component Value Date    TSH 0.623 05/22/2023     Lab Results   Component Value Date    CHOL 221 (H) 05/22/2023    HDL 38 05/22/2023    .00 05/22/2023    TRIG 277 (H) 05/22/2023     Lab Results   Component Value Date    COLORUA Light-Yellow (A) 09/09/2022    SGUA 1.009 09/09/2022    PROTEINUA Negative 09/09/2022    GLUCOSEUA Normal 09/09/2022    BILIRUBINUA Negative 09/09/2022    BLOODUA Negative 09/09/2022    WBCUA 0-5 09/09/2022    RBCUA 0-5 09/09/2022    BACTERIA None Seen 09/09/2022     NITRITESUA Negative 09/09/2022    LEUKOCYTESUR Negative 09/09/2022    UROBILINOGEN Normal 09/09/2022     Lab Results   Component Value Date    CREATRANDUR 58.4 (L) 09/09/2022    MICALBCREAT 31.2 (H) 05/26/2022     Lab Results   Component Value Date    [id number] 38.2 05/22/2023     Lab Results   Component Value Date    HIV Nonreactive 05/22/2023    HEPAIGM Nonreactive 05/22/2023    HEPBCOREM Nonreactive 05/22/2023    HEPCAB Nonreactive 05/22/2023     Assessment and Plan (including Health Maintenance)     Problem List Items Addressed This Visit          Cardiac/Vascular    Primary hypertension - Primary    Current Assessment & Plan     BP Readings from Last 3 Encounters:   06/05/23 (!) 160/100   05/22/23 (!) 144/90   02/20/23 138/88      Follow a low sodium (less than 2 grams of sodium per day), DASH diet.   D/c carvedilol.  Rx metoprolol succinate.  Increase losartan to 50 mg daily.  RTC in 4 weeks.  Monitor blood pressure and report any consistent values greater than 140/90 and keep a log.  Encouraged vaping cessation to aid in BP reduction and co-morbidities.   Maintain healthy weight with a BMI goal of <30.   Aerobic exercise for 150 minutes per week (or 5 days a week for 30 minutes each day).          Relevant Medications    metoprolol succinate (TOPROL-XL) 25 MG 24 hr tablet    losartan (COZAAR) 50 MG tablet       Endocrine    BMI 27.0-27.9,adult    Current Assessment & Plan     Body mass index is 27.99 kg/m². (At goal).              Health Maintenance Due   Topic Date Due    COVID-19 Vaccine (1) Never done     Tests to Keep You Healthy    Last Blood Pressure <= 139/89 (6/5/2023): NO      Health Maintenance Topics with due status: Not Due       Topic Last Completion Date    Lipid Panel 05/22/2023    High Dose Statin 06/05/2023    Influenza Vaccine Not Due       Future Appointments   Date Time Provider Department Center   7/6/2023  7:45 AM KELVIN Pineda [facility]   8/2/2023  8:00 AM  Nora Ricks MD [facility]          Follow up in about 4 weeks (around 7/3/2023) for F2F, Follow up, Med check, HTN.  RTC PRN.          Signature:        KELVIN Pineda  [facility]  [address] [id number]    Date of encounter: 6/5/23

## 2023-06-09 ENCOUNTER — PATIENT MESSAGE (OUTPATIENT)
Dept: INTERNAL MEDICINE | Facility: CLINIC | Age: 43
End: 2023-06-09
Payer: MEDICAID

## 2023-07-06 ENCOUNTER — OFFICE VISIT (OUTPATIENT)
Dept: INTERNAL MEDICINE | Facility: CLINIC | Age: 43
End: 2023-07-06
Payer: MEDICAID

## 2023-07-06 VITALS
HEART RATE: 44 BPM | TEMPERATURE: 98 F | DIASTOLIC BLOOD PRESSURE: 100 MMHG | OXYGEN SATURATION: 98 % | WEIGHT: 217.63 LBS | BODY MASS INDEX: 31.16 KG/M2 | RESPIRATION RATE: 18 BRPM | SYSTOLIC BLOOD PRESSURE: 170 MMHG | HEIGHT: 70 IN

## 2023-07-06 DIAGNOSIS — R00.1 BRADYCARDIA WITH 41-50 BEATS PER MINUTE: ICD-10-CM

## 2023-07-06 DIAGNOSIS — I10 PRIMARY HYPERTENSION: Primary | ICD-10-CM

## 2023-07-06 PROCEDURE — 4010F ACE/ARB THERAPY RXD/TAKEN: CPT | Mod: CPTII,,,

## 2023-07-06 PROCEDURE — 3008F PR BODY MASS INDEX (BMI) DOCUMENTED: ICD-10-PCS | Mod: CPTII,,,

## 2023-07-06 PROCEDURE — 1160F RVW MEDS BY RX/DR IN RCRD: CPT | Mod: CPTII,,,

## 2023-07-06 PROCEDURE — 3008F BODY MASS INDEX DOCD: CPT | Mod: CPTII,,,

## 2023-07-06 PROCEDURE — 99214 OFFICE O/P EST MOD 30 MIN: CPT | Mod: S$PBB,,,

## 2023-07-06 PROCEDURE — 3077F PR MOST RECENT SYSTOLIC BLOOD PRESSURE >= 140 MM HG: ICD-10-PCS | Mod: CPTII,,,

## 2023-07-06 PROCEDURE — 3080F PR MOST RECENT DIASTOLIC BLOOD PRESSURE >= 90 MM HG: ICD-10-PCS | Mod: CPTII,,,

## 2023-07-06 PROCEDURE — 3080F DIAST BP >= 90 MM HG: CPT | Mod: CPTII,,,

## 2023-07-06 PROCEDURE — 1159F MED LIST DOCD IN RCRD: CPT | Mod: CPTII,,,

## 2023-07-06 PROCEDURE — 1159F PR MEDICATION LIST DOCUMENTED IN MEDICAL RECORD: ICD-10-PCS | Mod: CPTII,,,

## 2023-07-06 PROCEDURE — 3077F SYST BP >= 140 MM HG: CPT | Mod: CPTII,,,

## 2023-07-06 PROCEDURE — 1160F PR REVIEW ALL MEDS BY PRESCRIBER/CLIN PHARMACIST DOCUMENTED: ICD-10-PCS | Mod: CPTII,,,

## 2023-07-06 PROCEDURE — 99214 PR OFFICE/OUTPT VISIT, EST, LEVL IV, 30-39 MIN: ICD-10-PCS | Mod: S$PBB,,,

## 2023-07-06 PROCEDURE — 4010F PR ACE/ARB THEARPY RXD/TAKEN: ICD-10-PCS | Mod: CPTII,,,

## 2023-07-06 PROCEDURE — 99215 OFFICE O/P EST HI 40 MIN: CPT | Mod: PBBFAC

## 2023-07-06 RX ORDER — LISINOPRIL 5 MG/1
5 TABLET ORAL DAILY
COMMUNITY
Start: 2023-07-03 | End: 2023-07-06

## 2023-07-06 RX ORDER — LOSARTAN POTASSIUM 100 MG/1
100 TABLET ORAL DAILY
Qty: 90 TABLET | Refills: 1 | Status: SHIPPED | OUTPATIENT
Start: 2023-07-06 | End: 2024-01-31 | Stop reason: SDUPTHER

## 2023-07-06 RX ORDER — CLONIDINE HYDROCHLORIDE 0.1 MG/1
0.1 TABLET ORAL EVERY 6 HOURS PRN
Qty: 60 TABLET | Refills: 1 | Status: SHIPPED | OUTPATIENT
Start: 2023-07-06 | End: 2023-09-18

## 2023-07-06 NOTE — ASSESSMENT & PLAN NOTE
HR-44.  D/c metoprolol.  Denies syncope, faint, dizziness.  EKG ordered - to be completed after visit.  Keep scheduled appt with [facility] cardiology clinic on 8/3/2023.

## 2023-07-06 NOTE — ASSESSMENT & PLAN NOTE
BP Readings from Last 3 Encounters:   07/06/23 (!) 170/100   06/05/23 (!) 160/100   05/22/23 (!) 144/90      Not at goal.  Follow a low sodium (less than 2 grams of sodium per day), DASH diet.   D/c metoprolol - bradycardia.  Increase losartan to 100 mg daily.  Rx clonidine prn.  Keep scheduled appt with [facility] cardiology on 8/3/2023.  Monitor blood pressure and report any consistent values greater than 140/90 and keep a log.  Encouraged smoking cessation to aid in BP reduction and co-morbidities.   Maintain healthy weight with a BMI goal of <30.   Aerobic exercise for 150 minutes per week (or 5 days a week for 30 minutes each day).

## 2023-07-06 NOTE — ASSESSMENT & PLAN NOTE
Body mass index is 31.22 kg/m².  Goal BMI <30.  Aerobic exercise 150 minutes per week.  Avoid soda, simple sugars, sweets, excessive rice, pasta, potatoes or bread.   Choose brown options when available and portion control.  Limit fast foods and fried foods.   Choose complex carbs in moderation (ex: green, leafy vegetables, beans, oatmeal).  Eat plenty of fresh fruits and vegetables with lean meats daily.   Consider permanent healthy lifestyle changes.

## 2023-07-06 NOTE — PROGRESS NOTES
PATIENT NAME: Jeff Sanchez Jr.  : 1980  DATE: 23  MRN: [id number]          Reason for Visit/Chief Complaint   Follow-up and Hypertension       History of Present Illness (HPI)     Jeff Sanchez Jr. is a 42 y.o. White male presenting in clinic today for Follow-up and Hypertension. PMH: CHF (EF less than 20% on 5/15/2022), left ventricular thrombus (no longer on warfarin), and lumbar radiculopathy with laminectomy, decompression, fusion L4-5-S1. Of note, he is no longer taking warfarin, and is taking a low dose ASA. Patient's mother is present during visit. Family on mother and father's side has a strong cardiac history. Patient's younger sister has suffered a stroke previously.     BP: 170/100, 178/95 - not at goal. Denies CP, SOB, HA, dizziness, or vision changes. He presented a BP log in clinic that ranges from 132//90 with HR range from 43-63. He voices compliance with all medications. Furosemide is ordered as PRN, but states he has not been taking it. He does not weigh himself daily. He is scheduled to establish care with [facility] cardiology on 8/3/2023.    Patient states he vapes. Denies alcohol or illicit drug use. Denies chest pain, shortness of breath, cough, headache, dizziness, weakness, abdominal pain, nausea, vomiting, diarrhea, constipation, dysuria, SI, and HI.       Review of Systems     Review of Systems   Constitutional: Negative.    HENT: Negative.     Eyes: Negative.    Respiratory: Negative.  Negative for chest tightness and shortness of breath.    Cardiovascular: Negative.    Gastrointestinal: Negative.    Endocrine: Negative.    Genitourinary: Negative.    Musculoskeletal: Negative.    Skin: Negative.    Allergic/Immunologic: Negative.    Neurological: Negative.    Hematological: Negative.    Psychiatric/Behavioral: Negative.     All other systems reviewed and are negative.    Medical / Social / Family History     Past Medical History:   Diagnosis Date    Cerebrovascular  "disease     CHF (congestive heart failure)     HLD (hyperlipidemia)     Hypertension     Lumbar herniated disc     Thrombus     LEFT VENTRICULAR         History reviewed. No pertinent surgical history.      Social History  Jeff Sanchez Jr.'s  reports that he has been smoking cigars and vaping with nicotine. He has quit using smokeless tobacco. He reports that he does not currently use alcohol. He reports that he does not use drugs.    Family History  Jeff Sanchez Jr.'s family history includes Diabetes in his mother; Heart attack in his maternal grandfather and paternal grandfather; Hyperlipidemia in his mother; Hypertension in his mother and sister; Lung cancer in his father; Schizophrenia in his father; Stroke in his mother and sister.    Medications and Allergies     Medications  Current Outpatient Medications   Medication Instructions    aspirin (ECOTRIN) 81 mg, Oral, Daily    atorvastatin (LIPITOR) 40 mg, Oral, Nightly    cloNIDine (CATAPRES) 0.1 mg, Oral, Every 6 hours PRN    furosemide (LASIX) 40 mg, Oral, Daily PRN    hydrocortisone (ANUSOL-HC) 2.5 % rectal cream Rectal, 2 times daily    linaCLOtide (LINZESS) 72 mcg, Oral, Daily PRN    losartan (COZAAR) 100 mg, Oral, Daily       Allergies  Review of patient's allergies indicates:  No Known Allergies    Physical Examination   Visit Vitals  BP (!) 170/100 (BP Location: Left arm, Patient Position: Sitting, BP Method: Large (Manual))   Pulse (!) 44   Temp 98 °F (36.7 °C) (Oral)   Resp 18   Ht 5' 10" (1.778 m)   Wt 98.7 kg (217 lb 9.5 oz)   SpO2 98%   BMI 31.22 kg/m²           Physical Exam  Vitals reviewed.   Constitutional:       Appearance: Normal appearance. He is normal weight.   HENT:      Head: Normocephalic.      Nose: Nose normal.      Mouth/Throat:      Mouth: Mucous membranes are moist.      Pharynx: Oropharynx is clear.   Eyes:      Extraocular Movements: Extraocular movements intact.      Conjunctiva/sclera: Conjunctivae normal.      Pupils: " Pupils are equal, round, and reactive to light.   Cardiovascular:      Rate and Rhythm: Normal rate and regular rhythm.      Heart sounds: Normal heart sounds.   Pulmonary:      Effort: Pulmonary effort is normal.      Breath sounds: Normal breath sounds.   Abdominal:      General: Abdomen is flat. Bowel sounds are normal.      Palpations: Abdomen is soft.   Musculoskeletal:         General: Normal range of motion.      Cervical back: Normal range of motion.   Skin:     General: Skin is warm and dry.      Capillary Refill: Capillary refill takes less than 2 seconds.   Neurological:      General: No focal deficit present.      Mental Status: He is alert and oriented to person, place, and time.   Psychiatric:         Mood and Affect: Mood normal.         Behavior: Behavior normal. Behavior is not agitated or hyperactive. Behavior is cooperative.         Results     Lab Results   Component Value Date    WBC 10.29 05/22/2023    RBC 5.37 05/22/2023    HGB 15.5 05/22/2023    HCT 47.3 05/22/2023    MCV 88.1 05/22/2023    MCH 28.9 05/22/2023    MCHC 32.8 (L) 05/22/2023    RDW 13.6 05/22/2023     05/22/2023    MPV 10.4 05/22/2023      Lab Results   Component Value Date     05/22/2023    K 4.0 05/22/2023    CHLORIDE 106 05/22/2023    CO2 28 05/22/2023    GLUCOSE 115 (H) 05/22/2023    BUN 11.4 05/22/2023    CREATININE 1.20 (H) 05/22/2023    LABPROT 7.6 05/22/2023    ALBUMIN 4.2 05/22/2023    BILITOT 0.5 05/22/2023    ALKPHOS 90 05/22/2023    AST 17 05/22/2023    ALT 19 05/22/2023    EGFRNORACEVR >60 05/22/2023     Lab Results   Component Value Date    TSH 0.623 05/22/2023    T4FREE 1.28 03/30/2022     Lab Results   Component Value Date    CHOL 221 (H) 05/22/2023    HDL 38 05/22/2023    .00 05/22/2023    TRIG 277 (H) 05/22/2023     Lab Results   Component Value Date    COLORUA Light-Yellow (A) 09/09/2022    SGUA 1.009 09/09/2022    PROTEINUA Negative 09/09/2022    GLUCOSEUA Normal 09/09/2022    BILIRUBINUA  Negative 09/09/2022    BLOODUA Negative 09/09/2022    WBCUA 0-5 09/09/2022    RBCUA 0-5 09/09/2022    BACTERIA None Seen 09/09/2022    NITRITESUA Negative 09/09/2022    LEUKOCYTESUR Negative 09/09/2022    UROBILINOGEN Normal 09/09/2022     Lab Results   Component Value Date    CREATRANDUR 58.4 (L) 09/09/2022    MICALBCREAT 31.2 (H) 05/26/2022     Lab Results   Component Value Date    [id number] 38.2 05/22/2023     Lab Results   Component Value Date    HIV Nonreactive 05/22/2023    HEPAIGM Nonreactive 05/22/2023    HEPBCOREM Nonreactive 05/22/2023    HEPCAB Nonreactive 05/22/2023     Assessment and Plan (including Health Maintenance)     Problem List Items Addressed This Visit          Cardiac/Vascular    Primary hypertension - Primary    Current Assessment & Plan     BP Readings from Last 3 Encounters:   07/06/23 (!) 170/100   06/05/23 (!) 160/100   05/22/23 (!) 144/90      Not at goal.  Follow a low sodium (less than 2 grams of sodium per day), DASH diet.   D/c metoprolol - bradycardia.  Increase losartan to 100 mg daily.  Rx clonidine prn.  Keep scheduled appt with [facility] cardiology on 8/3/2023.  Monitor blood pressure and report any consistent values greater than 140/90 and keep a log.  Encouraged smoking cessation to aid in BP reduction and co-morbidities.   Maintain healthy weight with a BMI goal of <30.   Aerobic exercise for 150 minutes per week (or 5 days a week for 30 minutes each day).          Relevant Medications    losartan (COZAAR) 100 MG tablet    cloNIDine (CATAPRES) 0.1 MG tablet    Bradycardia with 41-50 beats per minute    Current Assessment & Plan     HR-44.  D/c metoprolol.  Denies syncope, faint, dizziness.  EKG ordered - to be completed after visit.  Keep scheduled appt with [facility] cardiology clinic on 8/3/2023.         Relevant Orders    IN OFFICE EKG 12-LEAD (to Muse)       Endocrine    BMI 31.0-31.9,adult    Current Assessment & Plan     Body mass index is 31.22 kg/m².  Goal BMI <30.  Aerobic  exercise 150 minutes per week.  Avoid soda, simple sugars, sweets, excessive rice, pasta, potatoes or bread.   Choose brown options when available and portion control.  Limit fast foods and fried foods.   Choose complex carbs in moderation (ex: green, leafy vegetables, beans, oatmeal).  Eat plenty of fresh fruits and vegetables with lean meats daily.   Consider permanent healthy lifestyle changes.                Health Maintenance Due   Topic Date Due    COVID-19 Vaccine (1) Never done     Tests to Keep You Healthy    Last Blood Pressure <= 139/89 (7/6/2023): NO      Health Maintenance Topics with due status: Not Due       Topic Last Completion Date    Lipid Panel 05/22/2023    High Dose Statin 07/06/2023    Influenza Vaccine Not Due       Future Appointments   Date Time Provider Department Center   8/2/2023  8:00 AM Nora Ricks MD [facility]   8/3/2023  8:00 AM [facility]          Follow up in about 8 weeks (around 8/31/2023) for F2F, Follow up, Med check, Lab review, RTC PRN.            Signature:        KELVIN Pineda  [facility]  [address] [id number]    Date of encounter: 7/6/23

## 2023-07-07 ENCOUNTER — PATIENT MESSAGE (OUTPATIENT)
Dept: INTERNAL MEDICINE | Facility: CLINIC | Age: 43
End: 2023-07-07
Payer: MEDICAID

## 2023-08-03 ENCOUNTER — OFFICE VISIT (OUTPATIENT)
Dept: INTERNAL MEDICINE | Facility: CLINIC | Age: 43
End: 2023-08-03
Payer: MEDICAID

## 2023-08-03 VITALS
OXYGEN SATURATION: 97 % | RESPIRATION RATE: 18 BRPM | SYSTOLIC BLOOD PRESSURE: 138 MMHG | TEMPERATURE: 98 F | DIASTOLIC BLOOD PRESSURE: 81 MMHG | HEART RATE: 52 BPM | BODY MASS INDEX: 27.92 KG/M2 | HEIGHT: 70 IN | WEIGHT: 195 LBS

## 2023-08-03 DIAGNOSIS — I10 PRIMARY HYPERTENSION: Primary | ICD-10-CM

## 2023-08-03 DIAGNOSIS — E78.2 MIXED HYPERLIPIDEMIA: ICD-10-CM

## 2023-08-03 DIAGNOSIS — Z72.89 VAPES NON-NICOTINE CONTAINING SUBSTANCE: ICD-10-CM

## 2023-08-03 PROBLEM — Z91.199 MEDICALLY NONCOMPLIANT: Status: RESOLVED | Noted: 2023-02-20 | Resolved: 2023-08-03

## 2023-08-03 PROCEDURE — 99215 OFFICE O/P EST HI 40 MIN: CPT | Mod: PBBFAC

## 2023-08-03 PROCEDURE — 3060F PR POS MICROALBUMINURIA RESULT DOCUMENTED/REVIEW: ICD-10-PCS | Mod: CPTII,,,

## 2023-08-03 PROCEDURE — 99214 OFFICE O/P EST MOD 30 MIN: CPT | Mod: S$PBB,,,

## 2023-08-03 PROCEDURE — 3060F POS MICROALBUMINURIA REV: CPT | Mod: CPTII,,,

## 2023-08-03 PROCEDURE — 99214 PR OFFICE/OUTPT VISIT, EST, LEVL IV, 30-39 MIN: ICD-10-PCS | Mod: S$PBB,,,

## 2023-08-03 PROCEDURE — 1159F MED LIST DOCD IN RCRD: CPT | Mod: CPTII,,,

## 2023-08-03 PROCEDURE — 4010F ACE/ARB THERAPY RXD/TAKEN: CPT | Mod: CPTII,,,

## 2023-08-03 PROCEDURE — 3079F PR MOST RECENT DIASTOLIC BLOOD PRESSURE 80-89 MM HG: ICD-10-PCS | Mod: CPTII,,,

## 2023-08-03 PROCEDURE — 3075F SYST BP GE 130 - 139MM HG: CPT | Mod: CPTII,,,

## 2023-08-03 PROCEDURE — 3066F PR DOCUMENTATION OF TREATMENT FOR NEPHROPATHY: ICD-10-PCS | Mod: CPTII,,,

## 2023-08-03 PROCEDURE — 3044F PR MOST RECENT HEMOGLOBIN A1C LEVEL <7.0%: ICD-10-PCS | Mod: CPTII,,,

## 2023-08-03 PROCEDURE — 4010F PR ACE/ARB THEARPY RXD/TAKEN: ICD-10-PCS | Mod: CPTII,,,

## 2023-08-03 PROCEDURE — 3075F PR MOST RECENT SYSTOLIC BLOOD PRESS GE 130-139MM HG: ICD-10-PCS | Mod: CPTII,,,

## 2023-08-03 PROCEDURE — 3008F BODY MASS INDEX DOCD: CPT | Mod: CPTII,,,

## 2023-08-03 PROCEDURE — 3079F DIAST BP 80-89 MM HG: CPT | Mod: CPTII,,,

## 2023-08-03 PROCEDURE — 1160F PR REVIEW ALL MEDS BY PRESCRIBER/CLIN PHARMACIST DOCUMENTED: ICD-10-PCS | Mod: CPTII,,,

## 2023-08-03 PROCEDURE — 1159F PR MEDICATION LIST DOCUMENTED IN MEDICAL RECORD: ICD-10-PCS | Mod: CPTII,,,

## 2023-08-03 PROCEDURE — 1160F RVW MEDS BY RX/DR IN RCRD: CPT | Mod: CPTII,,,

## 2023-08-03 PROCEDURE — 3044F HG A1C LEVEL LT 7.0%: CPT | Mod: CPTII,,,

## 2023-08-03 PROCEDURE — 3066F NEPHROPATHY DOC TX: CPT | Mod: CPTII,,,

## 2023-08-03 PROCEDURE — 3008F PR BODY MASS INDEX (BMI) DOCUMENTED: ICD-10-PCS | Mod: CPTII,,,

## 2023-08-03 NOTE — ASSESSMENT & PLAN NOTE
Lab Results   Component Value Date    LDL 51.00 08/03/2023       Lab Results   Component Value Date    TRIG 235 (H) 08/03/2023       Lab Results   Component Value Date    HDL 24 (L) 08/03/2023        Lab Results   Component Value Date    CHOL 122 08/03/2023      Continue atorvastatin.  Follow a low cholesterol, low saturated fat diet with less than 200 mg of cholesterol a day.   Avoid fried foods and high saturated fats.  Add flax seed or fish oil supplements to diet.   Increase dietary fiber.   Regular exercise improves cholesterol levels.  Physical activity 5 times a week for 30 minutes per day (or 150 minutes per week).   Stressed importance of dietary modifications.

## 2023-08-03 NOTE — ASSESSMENT & PLAN NOTE
BP Readings from Last 3 Encounters:   08/03/23 138/81   07/06/23 (!) 170/100   06/05/23 (!) 160/100      At goal.  Follow a low sodium (less than 2 grams of sodium per day), DASH diet.   Continue losartan and clonidine as prescribed.  Keep scheduled appt with [facility] cardiology on 9/18/2023.  Monitor blood pressure and report any consistent values greater than 140/90 and keep a log.  Encouraged smoking cessation to aid in BP reduction and co-morbidities.   Maintain healthy weight with a BMI goal of <30.   Aerobic exercise for 150 minutes per week (or 5 days a week for 30 minutes each day).

## 2023-08-03 NOTE — PROGRESS NOTES
"    PATIENT NAME: Jeff Sanchez Jr.  : 1980  DATE: 8/3/23  MRN: [id number]          Reason for Visit/Chief Complaint   Hypertension       History of Present Illness (HPI)     Jeff Sanchez Jr. is a 42 y.o. White male presenting in clinic today for Hypertension. PMH: CHF (EF less than 20% on 5/15/2022), left ventricular thrombus (no longer on warfarin), and lumbar radiculopathy with laminectomy, decompression, fusion L4-5-S1. Of note, he is no longer taking warfarin, and is taking a low dose ASA. Family on mother and father's side has a strong cardiac history. Patient's younger sister has suffered a stroke previously.     BP: 138/81 - at goal. Denies CP, SOB, HA, dizziness, or vision changes. Did not present BP log in clinic, but states his blood pressure has been "good" since increasing losartan at last office visit. Denies having to use clonidine. Furosemide is ordered as PRN, but states he has not been taking it. He does not weigh himself daily. He is scheduled to establish care with [facility] cardiology on 2023.    Patient states he vapes. Denies alcohol or illicit drug use. Denies chest pain, shortness of breath, cough, headache, dizziness, weakness, abdominal pain, nausea, vomiting, diarrhea, constipation, dysuria, SI, and HI.       Review of Systems     Review of Systems   Constitutional: Negative.    HENT: Negative.     Eyes: Negative.    Respiratory: Negative.  Negative for chest tightness and shortness of breath.    Cardiovascular: Negative.    Gastrointestinal: Negative.    Endocrine: Negative.    Genitourinary: Negative.    Musculoskeletal: Negative.    Skin: Negative.    Allergic/Immunologic: Negative.    Neurological: Negative.    Hematological: Negative.    Psychiatric/Behavioral: Negative.     All other systems reviewed and are negative.      Medical / Social / Family History     Past Medical History:   Diagnosis Date    Cerebrovascular disease     CHF (congestive heart failure)     HLD " "(hyperlipidemia)     Hypertension     Lumbar herniated disc     Thrombus     LEFT VENTRICULAR         History reviewed. No pertinent surgical history.      Social History  Jeff Sanchez Jr.'s  reports that he has been smoking cigars and vaping with nicotine. He has quit using smokeless tobacco. He reports that he does not currently use alcohol. He reports that he does not use drugs.    Family History  Jeff Sanchez Jr.'s family history includes Diabetes in his mother; Heart attack in his maternal grandfather and paternal grandfather; Hyperlipidemia in his mother; Hypertension in his mother and sister; Lung cancer in his father; Schizophrenia in his father; Stroke in his mother and sister.    Medications and Allergies     Medications  Current Outpatient Medications   Medication Instructions    aspirin (ECOTRIN) 81 mg, Oral, Daily    atorvastatin (LIPITOR) 40 mg, Oral, Nightly    cloNIDine (CATAPRES) 0.1 mg, Oral, Every 6 hours PRN    furosemide (LASIX) 40 mg, Oral, Daily PRN    hydrocortisone (ANUSOL-HC) 2.5 % rectal cream Rectal, 2 times daily    linaCLOtide (LINZESS) 72 mcg, Oral, Daily PRN    losartan (COZAAR) 100 mg, Oral, Daily       Allergies  Review of patient's allergies indicates:  No Known Allergies    Physical Examination   Visit Vitals  /81 (BP Location: Left arm, Patient Position: Sitting, BP Method: Large (Automatic))   Pulse (!) 52   Temp 97.6 °F (36.4 °C) (Oral)   Resp 18   Ht 5' 10" (1.778 m)   Wt 88.5 kg (195 lb)   SpO2 97%   BMI 27.98 kg/m²           Physical Exam  Vitals reviewed.   Constitutional:       Appearance: Normal appearance. He is normal weight.   HENT:      Head: Normocephalic.      Nose: Nose normal.      Mouth/Throat:      Mouth: Mucous membranes are moist.      Pharynx: Oropharynx is clear.   Eyes:      Extraocular Movements: Extraocular movements intact.      Conjunctiva/sclera: Conjunctivae normal.      Pupils: Pupils are equal, round, and reactive to light. "   Cardiovascular:      Rate and Rhythm: Normal rate and regular rhythm.      Heart sounds: Normal heart sounds.   Pulmonary:      Effort: Pulmonary effort is normal.      Breath sounds: Normal breath sounds.   Abdominal:      General: Abdomen is flat. Bowel sounds are normal.      Palpations: Abdomen is soft.   Musculoskeletal:         General: Normal range of motion.      Cervical back: Normal range of motion.   Skin:     General: Skin is warm and dry.      Capillary Refill: Capillary refill takes less than 2 seconds.   Neurological:      General: No focal deficit present.      Mental Status: He is alert and oriented to person, place, and time.   Psychiatric:         Mood and Affect: Mood normal.         Behavior: Behavior normal. Behavior is not agitated or hyperactive. Behavior is cooperative.           Results     Lab Results   Component Value Date    WBC 8.41 08/03/2023    RBC 5.29 08/03/2023    HGB 15.6 08/03/2023    HCT 47.0 08/03/2023    MCV 88.8 08/03/2023    MCH 29.5 08/03/2023    MCHC 33.2 08/03/2023    RDW 14.0 08/03/2023     08/03/2023    MPV 10.2 08/03/2023      Lab Results   Component Value Date     08/03/2023    K 4.2 08/03/2023    CHLORIDE 107 08/03/2023    CO2 26 08/03/2023    GLUCOSE 117 (H) 08/03/2023    BUN 15.3 08/03/2023    CREATININE 1.21 (H) 08/03/2023    LABPROT 7.6 08/03/2023    ALBUMIN 4.3 08/03/2023    BILITOT 0.5 08/03/2023    ALKPHOS 92 08/03/2023    AST 23 08/03/2023    ALT 38 08/03/2023    EGFRNORACEVR >60 08/03/2023     Lab Results   Component Value Date    TSH 0.623 05/22/2023    T4FREE 1.28 03/30/2022     Lab Results   Component Value Date    CHOL 122 08/03/2023    HDL 24 (L) 08/03/2023    LDL 51.00 08/03/2023    TRIG 235 (H) 08/03/2023     Lab Results   Component Value Date    COLORUA Yellow 08/03/2023    SGUA 1.027 08/03/2023    PROTEINUA Trace (A) 08/03/2023    GLUCOSEUA Normal 08/03/2023    BILIRUBINUA Negative 08/03/2023    BLOODUA Negative 08/03/2023    WBCUA  0-5 08/03/2023    RBCUA 0-5 08/03/2023    BACTERIA None Seen 08/03/2023    NITRITESUA Negative 08/03/2023    LEUKOCYTESUR Negative 08/03/2023    UROBILINOGEN Normal 08/03/2023     Lab Results   Component Value Date    CREATRANDUR 244.1 (H) 08/03/2023    MICALBCREAT 25.3 08/03/2023     Lab Results   Component Value Date    WKWSNPLO88XR 38.2 05/22/2023     Lab Results   Component Value Date    HIV Nonreactive 05/22/2023    HEPAIGM Nonreactive 05/22/2023    HEPBCOREM Nonreactive 05/22/2023    HEPCAB Nonreactive 05/22/2023     Assessment and Plan (including Health Maintenance)     Problem List Items Addressed This Visit          Cardiac/Vascular    Mixed hyperlipidemia    Current Assessment & Plan     Lab Results   Component Value Date    LDL 51.00 08/03/2023       Lab Results   Component Value Date    TRIG 235 (H) 08/03/2023       Lab Results   Component Value Date    HDL 24 (L) 08/03/2023      Lab Results   Component Value Date    CHOL 122 08/03/2023      Continue atorvastatin.  Follow a low cholesterol, low saturated fat diet with less than 200 mg of cholesterol a day.   Avoid fried foods and high saturated fats.  Add flax seed or fish oil supplements to diet.   Increase dietary fiber.   Regular exercise improves cholesterol levels.  Physical activity 5 times a week for 30 minutes per day (or 150 minutes per week).   Stressed importance of dietary modifications.          Relevant Orders    Lipid Panel    Primary hypertension - Primary    Current Assessment & Plan     BP Readings from Last 3 Encounters:   08/03/23 138/81   07/06/23 (!) 170/100   06/05/23 (!) 160/100      At goal.  Follow a low sodium (less than 2 grams of sodium per day), DASH diet.   Continue losartan and clonidine as prescribed.  Keep scheduled appt with [facility] cardiology on 9/18/2023.  Monitor blood pressure and report any consistent values greater than 140/90 and keep a log.  Encouraged smoking cessation to aid in BP reduction and co-morbidities.    Maintain healthy weight with a BMI goal of <30.   Aerobic exercise for 150 minutes per week (or 5 days a week for 30 minutes each day).          Relevant Orders    CBC Auto Differential    Comprehensive Metabolic Panel    Microalbumin/Creatinine Ratio, Urine    Urinalysis, Reflex to Urine Culture       Endocrine    BMI 27.0-27.9,adult    Current Assessment & Plan     Body mass index is 27.98 kg/m². (At goal).             Other    Vapes non-nicotine containing substance    Current Assessment & Plan     Vaping cessation discussed.            Health Maintenance Due   Topic Date Due    COVID-19 Vaccine (1) Never done     Tests to Keep You Healthy    Last Blood Pressure <= 139/89 (8/3/2023): NO      Health Maintenance Topics with due status: Not Due       Topic Last Completion Date    High Dose Statin 08/03/2023    Lipid Panel 08/03/2023    Influenza Vaccine Not Due       Future Appointments   Date Time Provider Department Center   9/18/2023  8:00 AM Jackie Robertson PA-C [facility]   1/3/2024  8:00 AM Alfreda Jones FNP [facility]          Follow up in about 5 months (around 1/3/2024) for F2F, Follow up, Med check, Lab review, RTC PRN.            Signature:        KELVIN Pineda  [facility]  [address] [id number]    Date of encounter: 8/3/23

## 2023-09-18 ENCOUNTER — OFFICE VISIT (OUTPATIENT)
Dept: CARDIOLOGY | Facility: CLINIC | Age: 43
End: 2023-09-18
Payer: MEDICAID

## 2023-09-18 VITALS
HEIGHT: 71 IN | OXYGEN SATURATION: 100 % | RESPIRATION RATE: 18 BRPM | BODY MASS INDEX: 26.98 KG/M2 | WEIGHT: 192.69 LBS | SYSTOLIC BLOOD PRESSURE: 138 MMHG | HEART RATE: 58 BPM | TEMPERATURE: 98 F | DIASTOLIC BLOOD PRESSURE: 70 MMHG

## 2023-09-18 DIAGNOSIS — I10 PRIMARY HYPERTENSION: ICD-10-CM

## 2023-09-18 DIAGNOSIS — I10 ESSENTIAL HYPERTENSION: ICD-10-CM

## 2023-09-18 DIAGNOSIS — Z72.89 VAPES NON-NICOTINE CONTAINING SUBSTANCE: ICD-10-CM

## 2023-09-18 DIAGNOSIS — E78.2 MIXED HYPERLIPIDEMIA: ICD-10-CM

## 2023-09-18 DIAGNOSIS — I50.9 CONGESTIVE HEART FAILURE, UNSPECIFIED HF CHRONICITY, UNSPECIFIED HEART FAILURE TYPE: ICD-10-CM

## 2023-09-18 DIAGNOSIS — R79.89 ELEVATED TROPONIN: Primary | ICD-10-CM

## 2023-09-18 DIAGNOSIS — I42.7 CARDIOMYOPATHY SECONDARY TO DRUG: ICD-10-CM

## 2023-09-18 PROCEDURE — 99215 OFFICE O/P EST HI 40 MIN: CPT | Mod: PBBFAC

## 2023-09-18 NOTE — PATIENT INSTRUCTIONS
Case discussed with Dr. Martinez   Complete Echo  Follow up in cardiology clinic in 3 months or sooner if needed   Follow up with PCP as directed

## 2023-09-18 NOTE — PROGRESS NOTES
CHIEF COMPLAINT:   Chief Complaint   Patient presents with    Hypertension     Htn last ekg 07/06/23  sob taking deep breaths often denies other cardiac targets                                                  HPI:  Jeff Sanchez Jr. 43 y.o. male past medical history of HFrEF (EF<20% per TTE 3.30.22) with known LV thrombus, GERD, DM 2, HLD, anxiety, depression presents to Cardiology Clinic today for establishment of care.  Today patient states that he is feeling fairly well overall.  He states that he has been experiencing fatigue and occasional shortness of breath whenever he is stressed, otherwise he denies any cardiac complaints of chest pain, palpitations, PND, orthopnea, lightheadedness, dizziness, or syncope.  Patient reports that to his knowledge, his EF has recovered to approximately 50% and his LV thrombus has resolved?  Unable to find documentation supporting this.  He states that he is able to complete his ADLs without any issues or ischemic symptoms.  He states that he is fairly active in his day-to-day life in as his job requires him to perform physical activity.  He states that his diet is variable but recently he has not had much of an appetite.  He states that his blood pressure is normotensive at home, however he does not consistently track it.  He states that he took himself off of Clonidine and Lasix.  He states that he does not take his Clonidine because his SBP does not reach 160 whenever he checks it at home.  He states that he does not take Lasix anymore due to feeling dehydrated and frequent urination.  He reports vaping and smoking cigars, but denies any cigarette use.  He denies any recent illicit drug use.                                                                                                                                                                                                                                                                                                                                                                                                                                                                                       CARDIAC TESTING:  No results found for this or any previous visit.    No results found for this or any previous visit.     No results found for this or any previous visit.       Patient Active Problem List   Diagnosis    Congestive heart failure    Vapes non-nicotine containing substance    Left ventricular thrombus    Gastroesophageal reflux disease without esophagitis    Abdominal cramping    BMI 27.0-27.9,adult    Type 2 diabetes mellitus with kidney complication, without long-term current use of insulin    Mixed hyperlipidemia    CHF exacerbation    Cardiomyopathy secondary to drug    Elevated troponin    Anxiety and depression    Positive depression screening    Primary hypertension    Loss of appetite    Internal hemorrhoid    Bradycardia with 41-50 beats per minute     Past Surgical History:   Procedure Laterality Date    SPINE SURGERY  lower fusion     Social History     Socioeconomic History    Marital status: Single   Tobacco Use    Smoking status: Every Day     Types: Cigars, Vaping with nicotine    Smokeless tobacco: Former    Tobacco comments:     Smokes 4 cigars a day   Substance and Sexual Activity    Alcohol use: Not Currently    Drug use: Never    Sexual activity: Yes     Partners: Female     Social Determinants of Health     Financial Resource Strain: Medium Risk (8/3/2023)    Overall Financial Resource Strain (CARDIA)     Difficulty of Paying Living Expenses: Somewhat hard   Food Insecurity: No Food Insecurity (8/3/2023)    Hunger Vital Sign     Worried About Running Out of Food in the Last Year: Never true     Ran Out of Food in the Last Year: Never true   Transportation Needs: No Transportation Needs (8/3/2023)    PRAPARE - Transportation     Lack of Transportation (Medical): No     Lack of Transportation  "(Non-Medical): No   Physical Activity: Inactive (8/3/2023)    Exercise Vital Sign     Days of Exercise per Week: 0 days     Minutes of Exercise per Session: 0 min   Housing Stability: Unknown (8/3/2023)    Housing Stability Vital Sign     Unable to Pay for Housing in the Last Year: No     Unstable Housing in the Last Year: No        Family History   Problem Relation Age of Onset    Hypertension Mother     Diabetes Mother     Stroke Mother     Hyperlipidemia Mother     Lung cancer Father     Schizophrenia Father     Hypertension Sister     Stroke Sister     Heart attack Maternal Grandfather     Heart attack Paternal Grandfather      Review of patient's allergies indicates:  No Known Allergies      ROS:  Review of Systems   Constitutional:  Positive for malaise/fatigue and weight loss.   HENT: Negative.     Eyes: Negative.    Respiratory: Negative.  Negative for shortness of breath (Occasional).    Cardiovascular: Negative.  Negative for chest pain, palpitations, orthopnea, claudication, leg swelling and PND.   Gastrointestinal: Negative.    Genitourinary: Negative.    Musculoskeletal: Negative.    Skin: Negative.    Neurological: Negative.  Negative for dizziness and weakness.   Endo/Heme/Allergies: Negative.    Psychiatric/Behavioral:  Positive for depression.                                                                                                                                                                                 Negative except as stated in the history of present illness. See HPI for details.    PHYSICAL EXAM:  Visit Vitals  BP (!) 170/101 (BP Location: Right arm, Patient Position: Sitting, BP Method: Medium (Automatic))   Pulse (!) 58   Temp 97.9 °F (36.6 °C)   Resp 18   Ht 5' 11" (1.803 m)   Wt 87.4 kg (192 lb 10.9 oz)   SpO2 100%   BMI 26.87 kg/m²       Physical Exam  Constitutional:       Appearance: Normal appearance.   HENT:      Head: Normocephalic.      Mouth/Throat:      Mouth: Mucous " membranes are moist.   Eyes:      Extraocular Movements: Extraocular movements intact.   Neck:      Vascular: No carotid bruit.   Cardiovascular:      Rate and Rhythm: Normal rate and regular rhythm.      Pulses: Normal pulses.      Heart sounds: Murmur heard.   Pulmonary:      Effort: Pulmonary effort is normal.   Abdominal:      General: There is no distension.   Musculoskeletal:         General: Normal range of motion.      Right lower leg: No edema.      Left lower leg: No edema.   Skin:     General: Skin is warm and dry.   Neurological:      General: No focal deficit present.      Mental Status: He is alert and oriented to person, place, and time.   Psychiatric:         Mood and Affect: Mood normal.         Behavior: Behavior normal.         Current Outpatient Medications   Medication Instructions    aspirin (ECOTRIN) 81 mg, Oral, Daily    atorvastatin (LIPITOR) 40 mg, Oral, Nightly    cloNIDine (CATAPRES) 0.1 mg, Oral, Every 6 hours PRN    furosemide (LASIX) 40 mg, Oral, Daily PRN    hydrocortisone (ANUSOL-HC) 2.5 % rectal cream Rectal, 2 times daily    linaCLOtide (LINZESS) 72 mcg, Oral, Daily PRN    losartan (COZAAR) 100 mg, Oral, Daily        All medications, laboratory studies, cardiac diagnostic imaging reviewed.     Lab Results   Component Value Date    LDL 51.00 08/03/2023    .00 05/22/2023    TRIG 235 (H) 08/03/2023    TRIG 277 (H) 05/22/2023    CREATININE 1.21 (H) 08/03/2023    MG 1.90 08/03/2023    K 4.2 08/03/2023        ASSESSMENT/PLAN:    Essential hypertension  - BP slightly above goal today - repeat reading 138/70  - Patient states that he takes his blood pressure medications at night.  - Patient states that his blood pressure is typically at goal at home  - Patient recently took himself off of clonidine, stating that his blood pressure does not reach greater than 160 at home, therefore he does not have to take it  - Will have patient log BP at home for 2 weeks and return for nurse visit  for BP/symptom check in 2 weeks, will adjust medications at that time once more accurate idea of BP readings has been obtained  - Counseled on low-sodium, heart healthy diet and exercise as tolerated  - Ambulatory referral/consult Cardiology    Congestive heart failure, unspecified HF chronicity, unspecified heart failure type  Cardiomyopathy likely secondary to drug use  - Per last recent echo, patient was noted to have an EF less than 20% and a LV thrombus  - Patient states that prior to initial diagnosis of heart failure, he was using methamphetamines  - However, per patient, his EF has recovered to what he believes around 50% and LV thrombus is no longer present  - He states that he was taken off of anticoagulation by previous cardiologist due to resolution of the thrombus, however unable to obtain these documents  - He states that at some point, Dr. Coto discussed with him the possibility of needing a defibrillator, but the patient states that they never followed up with him regarding this matter  - Will obtain updated echo so that we may treat him appropriately  - States that he is not taking Lasix anymore due to feeling dehydrated and frequent urination, advised patient to take Lasix PRN for weight gain/swelling  - Ambulatory referral/consult to Cardiology  - Counseled on low-sodium (<2g of salt per day), heart healthy diet and exercise as tolerated    Hyperlipidemia  - LDL 51 per labs 8.3.23;  (decreased from prior)  - Continue Atorvastatin 80 MG daily, may need to add Fenofibrate in future if TGL to not decrease with statin and diet/exercise  - Counseled on low-cholesterol, heart healthy diet and exercise as tolerated    Depression  - Patient states that he sometimes feel depressed when he thinks about his heart conditions, advised him to bring this up with his primary care as they may be able to treat his symptoms   - Management per PCP    History of Substance Abuse  - Prior history of  methamphetamine use, states that he has not used any illicit drugs since he was diagnosed with HF/cardiomyopathy  - Counseled on importance of abstaining from illicit drugs    Tobacco Abuse  - Reports vaping and smoking cigars  - Counseled on cessation, states that he is trying to quit on his own    Weight Loss/Loss of Appetite  - Patient states that he has not had much of an appetite lately, he states that he is never hungry and he only eats whenever his hunger is causing him physical pain  - Advised him to bring this up with PCP   - Per chart review patient has lost 25 lb in the last 2 months        Case discussed with Dr. Martinez   Complete Echo  Follow up in cardiology clinic in 3 months or sooner if needed   Follow up with PCP as directed

## 2023-10-27 DIAGNOSIS — E78.2 MIXED HYPERLIPIDEMIA: ICD-10-CM

## 2023-10-27 RX ORDER — ATORVASTATIN CALCIUM 40 MG/1
40 TABLET, FILM COATED ORAL NIGHTLY
Qty: 90 TABLET | Refills: 3 | Status: SHIPPED | OUTPATIENT
Start: 2023-10-27 | End: 2024-02-15 | Stop reason: SDUPTHER

## 2023-10-30 RX ORDER — LISINOPRIL 5 MG/1
5 TABLET ORAL DAILY
COMMUNITY
Start: 2023-10-03 | End: 2024-02-01 | Stop reason: ALTCHOICE

## 2023-10-30 RX ORDER — FUROSEMIDE 40 MG/1
40 TABLET ORAL DAILY
COMMUNITY
Start: 2023-10-21

## 2023-12-30 ENCOUNTER — PATIENT MESSAGE (OUTPATIENT)
Dept: INTERNAL MEDICINE | Facility: CLINIC | Age: 43
End: 2023-12-30
Payer: MEDICAID

## 2024-01-11 DIAGNOSIS — I10 PRIMARY HYPERTENSION: ICD-10-CM

## 2024-01-11 RX ORDER — LOSARTAN POTASSIUM 100 MG/1
100 TABLET ORAL DAILY
Qty: 90 TABLET | Refills: 1 | Status: CANCELLED | OUTPATIENT
Start: 2024-01-11 | End: 2025-01-10

## 2024-01-24 ENCOUNTER — HOSPITAL ENCOUNTER (OUTPATIENT)
Dept: CARDIOLOGY | Facility: HOSPITAL | Age: 44
Discharge: HOME | End: 2024-01-24
Payer: MEDICAID

## 2024-01-24 VITALS
WEIGHT: 192 LBS | SYSTOLIC BLOOD PRESSURE: 178 MMHG | DIASTOLIC BLOOD PRESSURE: 94 MMHG | HEIGHT: 71 IN | BODY MASS INDEX: 26.88 KG/M2

## 2024-01-24 DIAGNOSIS — I50.9 CONGESTIVE HEART FAILURE, UNSPECIFIED HF CHRONICITY, UNSPECIFIED HEART FAILURE TYPE: ICD-10-CM

## 2024-01-24 LAB
ASCENDING AORTA: 3.6 CM
AV INDEX (PROSTH): 0.56
AV MEAN GRADIENT: 3 MMHG
AV PEAK GRADIENT: 7 MMHG
AV VALVE AREA BY VELOCITY RATIO: 2.03 CM²
AV VALVE AREA: 2.23 CM²
AV VELOCITY RATIO: 0.51
BSA FOR ECHO PROCEDURE: 2.09 M2
CV ECHO LV RWT: 0.51 CM
DOP CALC AO PEAK VEL: 1.29 M/S
DOP CALC AO VTI: 28.3 CM
DOP CALC LVOT AREA: 4 CM2
DOP CALC LVOT DIAMETER: 2.25 CM
DOP CALC LVOT PEAK VEL: 0.66 M/S
DOP CALC LVOT STROKE VOLUME: 63.19 CM3
DOP CALC MV VTI: 26 CM
DOP CALCLVOT PEAK VEL VTI: 15.9 CM
E WAVE DECELERATION TIME: 239.98 MSEC
E/A RATIO: 1.11
E/E' RATIO: 5.3 M/S
ECHO LV POSTERIOR WALL: 1.38 CM (ref 0.6–1.1)
FRACTIONAL SHORTENING: 19 % (ref 28–44)
INTERVENTRICULAR SEPTUM: 1.39 CM (ref 0.6–1.1)
LEFT ATRIUM SIZE: 3.66 CM
LEFT ATRIUM VOLUME INDEX MOD: 35.3 ML/M2
LEFT ATRIUM VOLUME MOD: 73 CM3
LEFT INTERNAL DIMENSION IN SYSTOLE: 4.39 CM (ref 2.1–4)
LEFT VENTRICLE DIASTOLIC VOLUME INDEX: 93.72 ML/M2
LEFT VENTRICLE DIASTOLIC VOLUME: 194 ML
LEFT VENTRICLE MASS INDEX: 158 G/M2
LEFT VENTRICLE SYSTOLIC VOLUME INDEX: 56.5 ML/M2
LEFT VENTRICLE SYSTOLIC VOLUME: 117 ML
LEFT VENTRICULAR INTERNAL DIMENSION IN DIASTOLE: 5.43 CM (ref 3.5–6)
LEFT VENTRICULAR MASS: 326.13 G
LV LATERAL E/E' RATIO: 4.69 M/S
LV SEPTAL E/E' RATIO: 6.1 M/S
LVOT MG: 0.98 MMHG
LVOT MV: 0.46 CM/S
MV MEAN GRADIENT: 1 MMHG
MV PEAK A VEL: 0.55 M/S
MV PEAK E VEL: 0.61 M/S
MV PEAK GRADIENT: 2 MMHG
MV STENOSIS PRESSURE HALF TIME: 69.6 MS
MV VALVE AREA BY CONTINUITY EQUATION: 2.43 CM2
MV VALVE AREA P 1/2 METHOD: 3.16 CM2
OHS LV EJECTION FRACTION SIMPSONS BIPLANE MOD: 40 %
PISA TR MAX VEL: 1.87 M/S
RA PRESSURE ESTIMATED: 3 MMHG
RIGHT VENTRICULAR END-DIASTOLIC DIMENSION: 2.92 CM
RV TB RVSP: 5 MMHG
SINUS: 3.6 CM
TDI LATERAL: 0.13 M/S
TDI SEPTAL: 0.1 M/S
TDI: 0.12 M/S
TR MAX PG: 14 MMHG
TV REST PULMONARY ARTERY PRESSURE: 17 MMHG
Z-SCORE OF LEFT VENTRICULAR DIMENSION IN END DIASTOLE: -1.48
Z-SCORE OF LEFT VENTRICULAR DIMENSION IN END SYSTOLE: 1.04

## 2024-01-24 PROCEDURE — 93306 TTE W/DOPPLER COMPLETE: CPT

## 2024-01-31 DIAGNOSIS — I10 PRIMARY HYPERTENSION: ICD-10-CM

## 2024-01-31 RX ORDER — LOSARTAN POTASSIUM 100 MG/1
100 TABLET ORAL DAILY
Qty: 90 TABLET | Refills: 1 | Status: SHIPPED | OUTPATIENT
Start: 2024-01-31 | End: 2024-03-04 | Stop reason: CLARIF

## 2024-02-01 ENCOUNTER — OFFICE VISIT (OUTPATIENT)
Dept: CARDIOLOGY | Facility: CLINIC | Age: 44
End: 2024-02-01
Payer: MEDICAID

## 2024-02-01 VITALS
OXYGEN SATURATION: 96 % | SYSTOLIC BLOOD PRESSURE: 138 MMHG | BODY MASS INDEX: 25.06 KG/M2 | HEIGHT: 71 IN | RESPIRATION RATE: 20 BRPM | WEIGHT: 179 LBS | TEMPERATURE: 98 F | HEART RATE: 89 BPM | DIASTOLIC BLOOD PRESSURE: 78 MMHG

## 2024-02-01 DIAGNOSIS — I50.9 CONGESTIVE HEART FAILURE, UNSPECIFIED HF CHRONICITY, UNSPECIFIED HEART FAILURE TYPE: ICD-10-CM

## 2024-02-01 DIAGNOSIS — I10 ESSENTIAL HYPERTENSION: Primary | ICD-10-CM

## 2024-02-01 PROCEDURE — 99214 OFFICE O/P EST MOD 30 MIN: CPT | Mod: PBBFAC | Performed by: INTERNAL MEDICINE

## 2024-02-01 RX ORDER — CARVEDILOL 3.12 MG/1
3.12 TABLET ORAL 2 TIMES DAILY WITH MEALS
Qty: 90 TABLET | Refills: 3 | Status: SHIPPED | OUTPATIENT
Start: 2024-02-01 | End: 2024-02-15

## 2024-02-01 NOTE — PROGRESS NOTES
Cardiology Attending    I evaluated Jeff Sanchze Jr. and discussed the patient's symptoms, findings, and management plan with the resident.  Please see the Cardiology note for details.

## 2024-02-01 NOTE — PROGRESS NOTES
CHIEF COMPLAINT:   Chief Complaint   Patient presents with    f/u denies chest pain or sob since LV no questions                                                  HPI:  Jeff Sanchez Jr. 43 y.o. male past medical history of HFrEF (EF<20% per TTE 3.30.22) with known LV thrombus, GERD, DM 2, HLD, anxiety, depression presents to Cardiology Clinic today for follow up. Patient denies an cardiac symtpoms such as chest pain, SOB, palpitations , dizziness. States his BP continues to be elevated at home with most recent log with systolics in 150s-160s. States he was previously on lisinopril but could not tolerate at all. Felt drowsy and tired. States he continues to smoke cigarettes but is actively trying to quit. Smoked one cigarette this week. Denies illicit drug use.                                                                                                                                                                                MET: 5                                                                                                                                                                                                                                                                                            CARDIAC TESTING:  No results found for this or any previous visit.    No results found for this or any previous visit.     No results found for this or any previous visit.       Patient Active Problem List   Diagnosis    Congestive heart failure    Vapes non-nicotine containing substance    Left ventricular thrombus    Gastroesophageal reflux disease without esophagitis    Abdominal cramping    BMI 27.0-27.9,adult    Type 2 diabetes mellitus with kidney complication, without long-term current use of insulin    Mixed hyperlipidemia    CHF exacerbation    Cardiomyopathy secondary to drug    Elevated troponin    Anxiety and depression    Positive depression screening    Primary hypertension    Loss of  appetite    Internal hemorrhoid    Bradycardia with 41-50 beats per minute     Past Surgical History:   Procedure Laterality Date    SPINE SURGERY  lower fusion     Social History     Socioeconomic History    Marital status: Single   Tobacco Use    Smoking status: Every Day     Types: Cigars, Vaping with nicotine    Smokeless tobacco: Former    Tobacco comments:     Smokes 4 cigars a day   Substance and Sexual Activity    Alcohol use: Not Currently    Drug use: Never    Sexual activity: Yes     Partners: Female     Social Determinants of Health     Financial Resource Strain: Medium Risk (8/3/2023)    Overall Financial Resource Strain (CARDIA)     Difficulty of Paying Living Expenses: Somewhat hard   Food Insecurity: No Food Insecurity (8/3/2023)    Hunger Vital Sign     Worried About Running Out of Food in the Last Year: Never true     Ran Out of Food in the Last Year: Never true   Transportation Needs: No Transportation Needs (8/3/2023)    PRAPARE - Transportation     Lack of Transportation (Medical): No     Lack of Transportation (Non-Medical): No   Physical Activity: Inactive (8/3/2023)    Exercise Vital Sign     Days of Exercise per Week: 0 days     Minutes of Exercise per Session: 0 min   Housing Stability: Unknown (8/3/2023)    Housing Stability Vital Sign     Unable to Pay for Housing in the Last Year: No     Unstable Housing in the Last Year: No        Family History   Problem Relation Age of Onset    Hypertension Mother     Diabetes Mother     Stroke Mother     Hyperlipidemia Mother     Lung cancer Father     Schizophrenia Father     Hypertension Sister     Stroke Sister     Heart attack Maternal Grandfather     Heart attack Paternal Grandfather      Review of patient's allergies indicates:  No Known Allergies      ROS:  Review of Systems   HENT: Negative.     Eyes: Negative.    Respiratory: Negative.  Negative for shortness of breath (Occasional).    Cardiovascular: Negative.  Negative for chest pain,  "palpitations, orthopnea, claudication, leg swelling and PND.   Gastrointestinal: Negative.    Genitourinary: Negative.    Musculoskeletal: Negative.    Skin: Negative.    Neurological: Negative.  Negative for dizziness and weakness.   Endo/Heme/Allergies: Negative.                                                                                                                                                                                 Negative except as stated in the history of present illness. See HPI for details.    PHYSICAL EXAM:  Visit Vitals  /78 (BP Location: Left arm, Patient Position: Sitting, BP Method: Medium (Manual))   Pulse 89   Temp 98.4 °F (36.9 °C) (Oral)   Resp 20   Ht 5' 11" (1.803 m)   Wt 81.2 kg (179 lb 0.2 oz)   SpO2 96%   BMI 24.97 kg/m²       Physical Exam  Constitutional:       Appearance: Normal appearance.   HENT:      Head: Normocephalic.      Mouth/Throat:      Mouth: Mucous membranes are moist.   Eyes:      Extraocular Movements: Extraocular movements intact.   Neck:      Vascular: No carotid bruit.   Cardiovascular:      Rate and Rhythm: Normal rate and regular rhythm.      Pulses: Normal pulses.      Heart sounds: Murmur heard.   Pulmonary:      Effort: Pulmonary effort is normal.   Abdominal:      General: There is no distension.   Musculoskeletal:         General: Normal range of motion.      Right lower leg: No edema.      Left lower leg: No edema.   Skin:     General: Skin is warm and dry.   Neurological:      General: No focal deficit present.      Mental Status: He is alert and oriented to person, place, and time.   Psychiatric:         Mood and Affect: Mood normal.         Behavior: Behavior normal.         Current Outpatient Medications   Medication Instructions    aspirin (ECOTRIN) 81 mg, Oral, Daily    atorvastatin (LIPITOR) 40 mg, Oral, Nightly    carvediloL (COREG) 3.125 mg, Oral, 2 times daily with meals    furosemide (LASIX) 40 mg, Oral, Daily    hydrocortisone " (ANUSOL-HC) 2.5 % rectal cream Rectal, 2 times daily    linaCLOtide (LINZESS) 72 mcg, Oral, Daily PRN    losartan (COZAAR) 100 mg, Oral, Daily        All medications, laboratory studies, cardiac diagnostic imaging reviewed.     Lab Results   Component Value Date    LDL 51.00 08/03/2023    .00 05/22/2023    TRIG 235 (H) 08/03/2023    TRIG 277 (H) 05/22/2023    CREATININE 1.21 (H) 08/03/2023    MG 1.90 08/03/2023    K 4.2 08/03/2023        ASSESSMENT/PLAN:    Essential hypertension  - BP slightly above goal today - repeat reading 138/78  - patient only currently on losartan 100 mg   -BP long from last few days with elevated -160s  -start coreg 3.125 mg BID   - Counseled on low-sodium, heart healthy diet and exercise as tolerated  - Ambulatory referral/consult Cardiology    Congestive heart failure, unspecified HF chronicity, unspecified heart failure type  Cardiomyopathy likely secondary to drug use  - Per last recent echo, patient was noted to have an EF less than 20% and a LV thrombus  - Patient states that prior to initial diagnosis of heart failure, he was using methamphetamines  - However, per patient, his EF has recovered to what he believes around 50% and LV thrombus is no longer present  - He states that he was taken off of anticoagulation by previous cardiologist due to resolution of the thrombus, however unable to obtain these documents  - He states that at some point, Dr. Coto discussed with him the possibility of needing a defibrillator, but the patient states that they never followed up with him regarding this matter  - States that he is not taking Lasix anymore due to feeling dehydrated and frequent urination, advised patient to take Lasix PRN for weight gain/swelling  - Ambulatory referral/consult to Cardiology  - Counseled on low-sodium (<2g of salt per day), heart healthy diet and exercise as tolerated  -most recent echo (1/24) with EF 40% and no thrombus visualized   -per [facility] start  coreg 3.125 mg BID     Hyperlipidemia  - LDL 51 per labs 8.3.23;  (decreased from prior)  - Continue Atorvastatin 80 MG daily, may need to add Fenofibrate in future if TGL to not decrease with statin and diet/exercise  - Counseled on low-cholesterol, heart healthy diet and exercise as tolerated    Depression  - Patient states that he sometimes feel depressed when he thinks about his heart conditions, advised him to bring this up with his primary care as they may be able to treat his symptoms   - Management per PCP    History of Substance Abuse  - Prior history of methamphetamine use, states that he has not used any illicit drugs since he was diagnosed with HF/cardiomyopathy  - Counseled on importance of abstaining from illicit drugs    Tobacco Abuse  - Reports vaping and smoking cigars  - Counseled on cessation, states that he is trying to quit on his own    Weight Loss/Loss of Appetite  - Patient states that he has not had much of an appetite lately, he states that he is never hungry and he only eats whenever his hunger is causing him physical pain  - Advised him to bring this up with PCP   - Per chart review patient has lost 25 lb in the last 2 months        Case discussed with Dr. Juan robles per [facility]  Follow up in cardiology clinic in 4 months or sooner if needed   Follow up with PCP as directed

## 2024-02-15 ENCOUNTER — OFFICE VISIT (OUTPATIENT)
Dept: INTERNAL MEDICINE | Facility: CLINIC | Age: 44
End: 2024-02-15
Payer: MEDICAID

## 2024-02-15 VITALS
HEIGHT: 71 IN | SYSTOLIC BLOOD PRESSURE: 164 MMHG | DIASTOLIC BLOOD PRESSURE: 92 MMHG | RESPIRATION RATE: 20 BRPM | BODY MASS INDEX: 26.99 KG/M2 | HEART RATE: 60 BPM | OXYGEN SATURATION: 98 % | TEMPERATURE: 98 F | WEIGHT: 192.81 LBS

## 2024-02-15 DIAGNOSIS — G89.29 CHRONIC MIDLINE LOW BACK PAIN WITHOUT SCIATICA: ICD-10-CM

## 2024-02-15 DIAGNOSIS — I50.9 CONGESTIVE HEART FAILURE, UNSPECIFIED HF CHRONICITY, UNSPECIFIED HEART FAILURE TYPE: ICD-10-CM

## 2024-02-15 DIAGNOSIS — F32.A ANXIETY AND DEPRESSION: ICD-10-CM

## 2024-02-15 DIAGNOSIS — F41.9 ANXIETY AND DEPRESSION: ICD-10-CM

## 2024-02-15 DIAGNOSIS — M54.50 CHRONIC MIDLINE LOW BACK PAIN WITHOUT SCIATICA: ICD-10-CM

## 2024-02-15 DIAGNOSIS — E78.2 MIXED HYPERLIPIDEMIA: ICD-10-CM

## 2024-02-15 DIAGNOSIS — I10 ESSENTIAL HYPERTENSION: ICD-10-CM

## 2024-02-15 DIAGNOSIS — Z13.0 SCREENING FOR IRON DEFICIENCY ANEMIA: ICD-10-CM

## 2024-02-15 DIAGNOSIS — Z12.5 SCREENING FOR PROSTATE CANCER: ICD-10-CM

## 2024-02-15 DIAGNOSIS — Z13.29 SCREENING FOR THYROID DISORDER: ICD-10-CM

## 2024-02-15 DIAGNOSIS — I10 PRIMARY HYPERTENSION: Primary | ICD-10-CM

## 2024-02-15 DIAGNOSIS — Z13.21 ENCOUNTER FOR VITAMIN DEFICIENCY SCREENING: ICD-10-CM

## 2024-02-15 PROCEDURE — 3080F DIAST BP >= 90 MM HG: CPT | Mod: CPTII,,,

## 2024-02-15 PROCEDURE — 99214 OFFICE O/P EST MOD 30 MIN: CPT | Mod: S$PBB,,,

## 2024-02-15 PROCEDURE — 1159F MED LIST DOCD IN RCRD: CPT | Mod: CPTII,,,

## 2024-02-15 PROCEDURE — 99215 OFFICE O/P EST HI 40 MIN: CPT | Mod: PBBFAC

## 2024-02-15 PROCEDURE — 3077F SYST BP >= 140 MM HG: CPT | Mod: CPTII,,,

## 2024-02-15 PROCEDURE — 4010F ACE/ARB THERAPY RXD/TAKEN: CPT | Mod: CPTII,,,

## 2024-02-15 PROCEDURE — 3008F BODY MASS INDEX DOCD: CPT | Mod: CPTII,,,

## 2024-02-15 PROCEDURE — 1160F RVW MEDS BY RX/DR IN RCRD: CPT | Mod: CPTII,,,

## 2024-02-15 RX ORDER — DULOXETIN HYDROCHLORIDE 30 MG/1
30 CAPSULE, DELAYED RELEASE ORAL DAILY
Qty: 45 CAPSULE | Refills: 1 | Status: SHIPPED | OUTPATIENT
Start: 2024-02-15 | End: 2024-03-14 | Stop reason: SINTOL

## 2024-02-15 RX ORDER — CARVEDILOL 3.12 MG/1
6.25 TABLET ORAL 2 TIMES DAILY WITH MEALS
Qty: 90 TABLET | Refills: 3 | Status: SHIPPED | OUTPATIENT
Start: 2024-02-15 | End: 2024-03-14 | Stop reason: SDUPTHER

## 2024-02-15 RX ORDER — METHOCARBAMOL 500 MG/1
500 TABLET, FILM COATED ORAL 3 TIMES DAILY PRN
Qty: 45 TABLET | Refills: 1 | Status: SHIPPED | OUTPATIENT
Start: 2024-02-15 | End: 2025-02-14

## 2024-02-15 RX ORDER — ATORVASTATIN CALCIUM 40 MG/1
40 TABLET, FILM COATED ORAL NIGHTLY
Qty: 90 TABLET | Refills: 3 | Status: SHIPPED | OUTPATIENT
Start: 2024-02-15 | End: 2025-02-14

## 2024-02-15 NOTE — PROGRESS NOTES
PATIENT NAME: Jeff Sanchez Jr.  : 1980  DATE: 2/15/24  MRN: [id number]          Reason for Visit/Chief Complaint   Follow-up, Anxiety, Depression, and Hypertension       History of Present Illness (HPI)     Jeff Sanchez Jr. is a 43 y.o. White male presenting in clinic today Follow-up, Anxiety, Depression, and Hypertension.  PMH: CHF (EF less than 20% on 5/15/2022, 40% on 2024), left ventricular thrombus (no longer on warfarin), and lumbar radiculopathy with laminectomy, decompression, fusion L4-5-S1. Of note, he is no longer taking warfarin, and is taking a low dose ASA. Family on mother and father's side has a strong cardiac history. Patient's younger sister has suffered a stroke previously. He is currently followed by [facility] cardiology clinic - last office visit 2024.      All pertinent labs dated 2024 (completed at [facility] - see media) reviewed and discussed with patient.     BP: 164/92 - not at goal. Denies CP, SOB, HA, dizziness, or vision changes. He presented a BP log, states he is bringing it to cardiology clinic for their review.      He reports feeling depressed, withdrawn. States he does not like to be around people or engage in conversations. He attributes some of his symptoms due to his health problems. Was previously treated with buspirone and hydroxyzine, bue he self discontinued. He is amendable for medication management.    Patient reports intermittent back pain that is chronic. Denies saddle paresthesia or incontinent episodes. Denies radiating pain.     Patient states he vapes. Denies alcohol or illicit drug use. Denies chest pain, shortness of breath, cough, headache, dizziness, weakness, abdominal pain, nausea, vomiting, diarrhea, constipation, dysuria, SI, and HI.    Prostate Cancer Screening - 2023 - PSA: 0.55. Follow up annually.   Colon Cancer Screening - Deferred due to age. Will initiate testing at age 45.  Eye Exam -Several years. List of local eye doctors given  to patient.   Dental Exam - Several years. List of local dentists given to patient.   Vaccinations: Flu - Declines / Pneumococcal - Declines / Tetanus - Declines /     Review of Systems     Review of Systems   Constitutional: Negative.    HENT: Negative.     Eyes: Negative.    Respiratory: Negative.  Negative for chest tightness and shortness of breath.    Cardiovascular: Negative.  Negative for chest pain and leg swelling.   Gastrointestinal: Negative.    Endocrine: Negative.    Genitourinary: Negative.    Musculoskeletal:  Positive for back pain.   Skin: Negative.    Allergic/Immunologic: Negative.    Neurological: Negative.    Hematological: Negative.    Psychiatric/Behavioral:  Positive for dysphoric mood.    All other systems reviewed and are negative.      Medical / Social / Family History     Past Medical History:   Diagnosis Date    Cerebrovascular disease     CHF (congestive heart failure)     HLD (hyperlipidemia)     Hypertension     Lumbar herniated disc     Thrombus     LEFT VENTRICULAR         Past Surgical History:   Procedure Laterality Date    SPINE SURGERY  lower fusion         Social History  Jeff Sanchez Jr.'s  reports that he has been smoking cigars and vaping with nicotine. He has quit using smokeless tobacco. He reports that he does not currently use alcohol. He reports that he does not use drugs.    Family History  Jeff Sanchez Jr.'s family history includes Diabetes in his mother; Heart attack in his maternal grandfather and paternal grandfather; Hyperlipidemia in his mother; Hypertension in his mother and sister; Lung cancer in his father; Schizophrenia in his father; Stroke in his mother and sister.    Medications and Allergies     Medications  Current Outpatient Medications   Medication Instructions    aspirin (ECOTRIN) 81 mg, Oral, Daily    atorvastatin (LIPITOR) 40 mg, Oral, Nightly    carvediloL (COREG) 6.25 mg, Oral, 2 times daily with meals    DULoxetine (CYMBALTA) 30 mg, Oral,  "Daily    furosemide (LASIX) 40 mg, Oral, Daily    losartan (COZAAR) 100 mg, Oral, Daily    methocarbamoL (ROBAXIN) 500 mg, Oral, 3 times daily PRN       Allergies  Review of patient's allergies indicates:  No Known Allergies    Physical Examination   Visit Vitals  BP (!) 164/92 (BP Location: Left arm, Patient Position: Sitting, BP Method: Large (Manual))   Pulse 60   Temp 97.8 °F (36.6 °C) (Oral)   Resp 20   Ht 5' 11" (1.803 m)   Wt 87.5 kg (192 lb 12.8 oz)   SpO2 98%   BMI 26.89 kg/m²     Physical Exam  Vitals reviewed.   Constitutional:       Appearance: Normal appearance. He is normal weight.   HENT:      Head: Normocephalic.      Nose: Nose normal.      Mouth/Throat:      Mouth: Mucous membranes are moist.      Pharynx: Oropharynx is clear.   Eyes:      Extraocular Movements: Extraocular movements intact.      Conjunctiva/sclera: Conjunctivae normal.      Pupils: Pupils are equal, round, and reactive to light.   Cardiovascular:      Rate and Rhythm: Normal rate and regular rhythm.      Heart sounds: Normal heart sounds.   Pulmonary:      Effort: Pulmonary effort is normal.      Breath sounds: Normal breath sounds.   Abdominal:      General: Abdomen is flat. Bowel sounds are normal.      Palpations: Abdomen is soft.   Musculoskeletal:         General: Normal range of motion.      Cervical back: Normal range of motion.      Right lower leg: No edema.      Left lower leg: No edema.   Skin:     General: Skin is warm and dry.      Capillary Refill: Capillary refill takes less than 2 seconds.   Neurological:      General: No focal deficit present.      Mental Status: He is alert and oriented to person, place, and time.   Psychiatric:         Mood and Affect: Mood is depressed.         Behavior: Behavior is cooperative.           Results     Lab Results   Component Value Date    WBC 8.41 08/03/2023    RBC 5.29 08/03/2023    HGB 15.6 08/03/2023    HCT 47.0 08/03/2023    MCV 88.8 08/03/2023    MCH 29.5 08/03/2023    MCHC " "33.2 08/03/2023    RDW 14.0 08/03/2023     08/03/2023    MPV 10.2 08/03/2023      Lab Results   Component Value Date     08/03/2023    K 4.2 08/03/2023    CHLORIDE 107 08/03/2023    CO2 26 08/03/2023    GLUCOSE 117 (H) 08/03/2023    BUN 15.3 08/03/2023    CREATININE 1.21 (H) 08/03/2023    LABPROT 7.6 08/03/2023    ALBUMIN 4.3 08/03/2023    BILITOT 0.5 08/03/2023    ALKPHOS 92 08/03/2023    AST 23 08/03/2023    ALT 38 08/03/2023    EGFRNORACEVR >60 08/03/2023     Lab Results   Component Value Date    TSH 0.623 05/22/2023    T4FREE 1.28 03/30/2022     Lab Results   Component Value Date    CHOL 122 08/03/2023    HDL 24 (L) 08/03/2023    LDL 51.00 08/03/2023    TRIG 235 (H) 08/03/2023     Lab Results   Component Value Date    COLORUA Yellow 08/03/2023    SGUA 1.027 08/03/2023    PROTEINUA Trace (A) 08/03/2023    GLUCOSEUA Normal 08/03/2023    BILIRUBINUA Negative 08/03/2023    BLOODUA Negative 08/03/2023    WBCUA 0-5 08/03/2023    RBCUA 0-5 08/03/2023    BACTERIA None Seen 08/03/2023    NITRITESUA Negative 08/03/2023    LEUKOCYTESUR Negative 08/03/2023    UROBILINOGEN Normal 08/03/2023     Lab Results   Component Value Date    CREATRANDUR 244.1 (H) 08/03/2023    MICALBCREAT 25.3 08/03/2023     Lab Results   Component Value Date    IGOFDGRV32SE 38.2 05/22/2023     Lab Results   Component Value Date    HIV Nonreactive 05/22/2023    HEPAIGM Nonreactive 05/22/2023    HEPBCOREM Nonreactive 05/22/2023    HEPCAB Nonreactive 05/22/2023     No results found for: "FITDIAG", "COLOGUARD"  No results found for: "OCCBLDIA"    Assessment and Plan (including Health Maintenance)     Problem List Items Addressed This Visit          Psychiatric    Anxiety and depression    Current Assessment & Plan         2/1/2024     3:25 PM 9/18/2023     8:08 AM 2/20/2023     1:57 PM 11/30/2022     7:47 AM 9/14/2022    11:48 AM 5/26/2022    12:44 PM   Depression Patient Health Questionnaire   Over the last two weeks how often have you been " bothered by little interest or pleasure in doing things Not at all Several days Not at all Several days Not at all Nearly every day   Over the last two weeks how often have you been bothered by feeling down, depressed or hopeless Not at all Several days Several days More than half the days Not at all Nearly every day   PHQ-2 Total Score 0 2 1 3 0 6   Over the last two weeks how often have you been bothered by trouble falling or staying asleep, or sleeping too much    Several days  Nearly every day   Over the last two weeks how often have you been bothered by feeling tired or having little energy    Nearly every day  Several days   Over the last two weeks how often have you been bothered by a poor appetite or overeating    Several days  Several days   Over the last two weeks how often have you been bothered by feeling bad about yourself - or that you are a failure or have let yourself or your family down    Several days  Several days   Over the last two weeks how often have you been bothered by trouble concentrating on things, such as reading the newspaper or watching television    Nearly every day  Nearly every day   Over the last two weeks how often have you been bothered by moving or speaking so slowly that other people could have noticed. Or the opposite - being so fidgety or restless that you have been moving around a lot more than usual.    More than half the days  Several days   Over the last two weeks how often have you been bothered by thoughts that you would be better off dead, or of hurting yourself    Not at all  Not at all   If you checked off any problems, how difficult have these problems made it for you to do your work, take care of things at home or get along with other people?    Extremely difficult  Somewhat difficult   PHQ-9 Score    14  16   PHQ-9 Interpretation    Moderate  Moderately Severe          Denies SI/HI  Rx duloxetine.  Declines referral to .  Virtual visit in 4 weeks for  evaluation.  Read positive daily meditations, avoid negative media, set healthy boundaries.   Exercise daily, keep consistent sleep pattern, eat a healthy diet.   Establish good social support, make changes to reduce stress.   Do not drink alcohol or use illicit drugs.   Reports any symptoms of suicidal/homicidal ideations or self harm immediately, go to nearest emergency room.            Relevant Medications    DULoxetine (CYMBALTA) 30 MG capsule       Cardiac/Vascular    Congestive heart failure    Overview     1/24/2024-ECHO      Left Ventricle: The left ventricle is moderately dilated. Moderately increased wall thickness. There is severe concentric hypertrophy. Moderate global hypokinesis present. There is moderately reduced systolic function. Biplane (2D) method of discs ejection fraction is 40%. There is normal diastolic function.    Right Ventricle: Normal right ventricular cavity size. Systolic function is normal.    Left Atrium: Left atrium is mildly dilated.    Right Atrium: Normal right atrial size.    Aortic Valve: The aortic valve is a trileaflet valve. There is no stenosis. There is no significant regurgitation.    Mitral Valve: The mitral valve is structurally normal. There is no stenosis.There is trace regurgitation.    Tricuspid Valve: The tricuspid valve is structurally normal. There is trace regurgitation.    Pulmonic Valve: There is trace regurgitation.    Aorta: Aortic root is normal in size measuring 3.6 cm. Ascending aorta is mildly dilated measuring 3.6 cm.    Pulmonary Artery: No pulmonary hypertension. The estimated pulmonary artery systolic pressure is 17 mmHg.    IVC/SVC: Normal venous pressure at 3 mmHg.    Pericardium: There is no pericardial effusion.         Current Assessment & Plan     Managed by [facility]iology clinic.  Continue atorvastatin, carvedilol, furosemide, and losartan   Last ECHO (1/24/2024) showed EF of less than 40%.  Notify the clinic if you gain 3 or more pounds in  one day or 5 or more pounds in one week.  Stressed importance of daily morning weights after urination but prior to breakfast on the same scale.  Low Sodium Diet (Dash Diet - less than 2 grams of sodium per day).  Follow a low cholesterol, low saturated fat diet with less that 200mg of cholesterol a day.  Cut down on alcohol if you have more than 1 drink a day (for women) or 2 drinks a day (for men).  Maintain healthy weight with goal BMI <30. Perform 30 minutes of daily physical activity 5 days per week.  Report to ER for chest pain, SOB, difficulty breathing, abdominal distention or significant edema to lower extremities.            Relevant Orders    Comprehensive Metabolic Panel    Mixed hyperlipidemia    Current Assessment & Plan     Lab Results   Component Value Date    LDL 51.00 08/03/2023       Lab Results   Component Value Date    TRIG 235 (H) 08/03/2023       Lab Results   Component Value Date    HDL 24 (L) 08/03/2023      Lab Results   Component Value Date    CHOL 122 08/03/2023      Continue atorvastatin as prescribed.  Follow a low cholesterol, low saturated fat diet with less than 200 mg of cholesterol a day.   Avoid fried foods and high saturated fats.  Add flax seed or fish oil supplements to diet.   Increase dietary fiber.   Regular exercise improves cholesterol levels.  Physical activity 5 times a week for 30 minutes per day (or 150 minutes per week).   Stressed importance of dietary modifications.          Relevant Medications    atorvastatin (LIPITOR) 40 MG tablet    Other Relevant Orders    Lipid Panel    Primary hypertension - Primary    Current Assessment & Plan     BP Readings from Last 3 Encounters:   02/15/24 (!) 164/92   02/01/24 138/78   01/24/24 (!) 178/94      Not at goal.  Follow a low sodium (less than 2 grams of sodium per day), DASH diet.   Continue losartan and carvedilol as prescribed.  Bring BP log to cardiology clinic.  Monitor blood pressure and report any consistent values  greater than 140/90 and keep a log.  Encouraged smoking cessation to aid in BP reduction and co-morbidities.   Maintain healthy weight with a BMI goal of <30.   Aerobic exercise for 150 minutes per week (or 5 days a week for 30 minutes each day).          Relevant Orders    Urinalysis, Reflex to Urine Culture    Microalbumin/Creatinine Ratio, Urine    Hemoglobin A1C    Comprehensive Metabolic Panel    CBC Auto Differential       Endocrine    BMI 26.0-26.9,adult    Current Assessment & Plan     Body mass index is 26.89 kg/m². (At goal).             Orthopedic    Chronic midline low back pain without sciatica    Current Assessment & Plan     Rx methocarbamol.  Perform back stretches daily.   Avoid activities than increase back pain or stiffness.   Apply heating pad, ice pack, and BioFreeze as needed; alternate every 15-20 minutes.   Massage back to loosen muscles.   Continue tylenol arthritis/NSAID/muscle relaxer as needed.          Relevant Medications    methocarbamoL (ROBAXIN) 500 MG Tab     Other Visit Diagnoses       Screening for iron deficiency anemia        Relevant Orders    Iron and TIBC    Ferritin    Encounter for vitamin deficiency screening        Relevant Orders    Vitamin D    Screening for prostate cancer        Relevant Orders    PSA, Screening    Screening for thyroid disorder        Relevant Orders    TSH    T4, Free             Health Maintenance Due   Topic Date Due    COVID-19 Vaccine (1) Never done     Tests to Keep You Healthy    Last Blood Pressure <= 139/89 (2/15/2024): NO  Tobacco Cessation: NO      Health Maintenance Topics with due status: Not Due       Topic Last Completion Date    Lipid Panel 08/03/2023    High Dose Statin 02/15/2024       Future Appointments   Date Time Provider Department Center   3/4/2024 11:15 AM NURSE, [facility] CARDIOLOGY [facility] J[facility] [place]   3/14/2024 12:00 PM Alfreda Jones FNP [facility] LETIC[facility] [place]   5/30/2024  2:00 PM Larry Martinez MD [facility] J[facility]  [place]         Follow up in about 4 weeks (around 3/14/2024) for Virtual Visit, Follow up, Med check, No labs due, Depression, Back pain, RTC PRN.          Signature:        KELVIN Pineda  [facility]  [address] [facility] [id number]    Date of encounter: 2/15/24

## 2024-02-16 PROBLEM — E11.29 TYPE 2 DIABETES MELLITUS WITH KIDNEY COMPLICATION, WITHOUT LONG-TERM CURRENT USE OF INSULIN: Status: RESOLVED | Noted: 2022-08-24 | Resolved: 2024-02-16

## 2024-02-16 PROBLEM — M54.50 CHRONIC MIDLINE LOW BACK PAIN WITHOUT SCIATICA: Status: ACTIVE | Noted: 2024-02-16

## 2024-02-16 PROBLEM — G89.29 CHRONIC MIDLINE LOW BACK PAIN WITHOUT SCIATICA: Status: ACTIVE | Noted: 2024-02-16

## 2024-02-16 NOTE — ASSESSMENT & PLAN NOTE
Lab Results   Component Value Date    LDL 51.00 08/03/2023       Lab Results   Component Value Date    TRIG 235 (H) 08/03/2023       Lab Results   Component Value Date    HDL 24 (L) 08/03/2023        Lab Results   Component Value Date    CHOL 122 08/03/2023      Continue atorvastatin as prescribed.  Follow a low cholesterol, low saturated fat diet with less than 200 mg of cholesterol a day.   Avoid fried foods and high saturated fats.  Add flax seed or fish oil supplements to diet.   Increase dietary fiber.   Regular exercise improves cholesterol levels.  Physical activity 5 times a week for 30 minutes per day (or 150 minutes per week).   Stressed importance of dietary modifications.

## 2024-02-16 NOTE — ASSESSMENT & PLAN NOTE
Rx methocarbamol.  Perform back stretches daily.   Avoid activities than increase back pain or stiffness.   Apply heating pad, ice pack, and BioFreeze as needed; alternate every 15-20 minutes.   Massage back to loosen muscles.   Continue tylenol arthritis/NSAID/muscle relaxer as needed.

## 2024-02-16 NOTE — ASSESSMENT & PLAN NOTE
2/1/2024     3:25 PM 9/18/2023     8:08 AM 2/20/2023     1:57 PM 11/30/2022     7:47 AM 9/14/2022    11:48 AM 5/26/2022    12:44 PM   Depression Patient Health Questionnaire   Over the last two weeks how often have you been bothered by little interest or pleasure in doing things Not at all Several days Not at all Several days Not at all Nearly every day   Over the last two weeks how often have you been bothered by feeling down, depressed or hopeless Not at all Several days Several days More than half the days Not at all Nearly every day   PHQ-2 Total Score 0 2 1 3 0 6   Over the last two weeks how often have you been bothered by trouble falling or staying asleep, or sleeping too much    Several days  Nearly every day   Over the last two weeks how often have you been bothered by feeling tired or having little energy    Nearly every day  Several days   Over the last two weeks how often have you been bothered by a poor appetite or overeating    Several days  Several days   Over the last two weeks how often have you been bothered by feeling bad about yourself - or that you are a failure or have let yourself or your family down    Several days  Several days   Over the last two weeks how often have you been bothered by trouble concentrating on things, such as reading the newspaper or watching television    Nearly every day  Nearly every day   Over the last two weeks how often have you been bothered by moving or speaking so slowly that other people could have noticed. Or the opposite - being so fidgety or restless that you have been moving around a lot more than usual.    More than half the days  Several days   Over the last two weeks how often have you been bothered by thoughts that you would be better off dead, or of hurting yourself    Not at all  Not at all   If you checked off any problems, how difficult have these problems made it for you to do your work, take care of things at home or get along with other  people?    Extremely difficult  Somewhat difficult   PHQ-9 Score    14  16   PHQ-9 Interpretation    Moderate  Moderately Severe            Denies SI/HI  Rx duloxetine.  Declines referral to .  Virtual visit in 4 weeks for evaluation.  Read positive daily meditations, avoid negative media, set healthy boundaries.   Exercise daily, keep consistent sleep pattern, eat a healthy diet.   Establish good social support, make changes to reduce stress.   Do not drink alcohol or use illicit drugs.   Reports any symptoms of suicidal/homicidal ideations or self harm immediately, go to nearest emergency room.

## 2024-02-16 NOTE — ASSESSMENT & PLAN NOTE
BP Readings from Last 3 Encounters:   02/15/24 (!) 164/92   02/01/24 138/78   01/24/24 (!) 178/94      Not at goal.  Follow a low sodium (less than 2 grams of sodium per day), DASH diet.   Continue losartan and carvedilol as prescribed.  Bring BP log to cardiology clinic.  Monitor blood pressure and report any consistent values greater than 140/90 and keep a log.  Encouraged smoking cessation to aid in BP reduction and co-morbidities.   Maintain healthy weight with a BMI goal of <30.   Aerobic exercise for 150 minutes per week (or 5 days a week for 30 minutes each day).

## 2024-02-16 NOTE — ASSESSMENT & PLAN NOTE
Managed by [facility] Cardiology clinic.  Continue atorvastatin, carvedilol, furosemide, and losartan   Last ECHO (1/24/2024) showed EF of less than 40%.  Notify the clinic if you gain 3 or more pounds in one day or 5 or more pounds in one week.  Stressed importance of daily morning weights after urination but prior to breakfast on the same scale.  Low Sodium Diet (Dash Diet - less than 2 grams of sodium per day).  Follow a low cholesterol, low saturated fat diet with less that 200mg of cholesterol a day.  Cut down on alcohol if you have more than 1 drink a day (for women) or 2 drinks a day (for men).  Maintain healthy weight with goal BMI <30. Perform 30 minutes of daily physical activity 5 days per week.  Report to ER for chest pain, SOB, difficulty breathing, abdominal distention or significant edema to lower extremities.

## 2024-02-20 ENCOUNTER — TELEPHONE (OUTPATIENT)
Dept: CARDIOLOGY | Facility: CLINIC | Age: 44
End: 2024-02-20
Payer: MEDICAID

## 2024-02-20 NOTE — TELEPHONE ENCOUNTER
----- Message from oDminga Garcia LPN sent at 2/16/2024 11:04 AM CST -----  No answer x 2   ----- Message -----  From: Dominga Garcia LPN  Sent: 2/15/2024   4:09 PM CST  To: [facility] Clinical Support Staff    Attempted to reach pt to discuss new orders. No answer, unable to leave message   ----- Message -----  From: Larry Martinez MD  Sent: 2/15/2024   4:04 PM CST  To: Dominga Garcia LPN    BP is running high on the BP log.  Recommend increasing coreg from 3.125 mg bid to 6.25 mg bid.   After couple weeks repeat the BP log.  ----- Message -----  From: Dominga Garcia LPN  Sent: 2/15/2024  12:42 PM CST  To: Larry Martinez MD      Pt brought in B/P log and it has been scanned into  for your review, please advise of any new orders.   ----- Message -----  From: Sachi Minaya  Sent: 2/15/2024   9:22 AM CST  To: [facility] Clinical Support Staff    Pt brought in B/P log and it has been scanned into .    Thank you,

## 2024-03-04 ENCOUNTER — CLINICAL SUPPORT (OUTPATIENT)
Dept: CARDIOLOGY | Facility: CLINIC | Age: 44
End: 2024-03-04
Payer: MEDICAID

## 2024-03-04 VITALS
HEART RATE: 62 BPM | TEMPERATURE: 98 F | BODY MASS INDEX: 27.27 KG/M2 | HEIGHT: 71 IN | WEIGHT: 194.81 LBS | DIASTOLIC BLOOD PRESSURE: 86 MMHG | SYSTOLIC BLOOD PRESSURE: 158 MMHG | RESPIRATION RATE: 20 BRPM | OXYGEN SATURATION: 99 %

## 2024-03-04 DIAGNOSIS — I10 PRIMARY HYPERTENSION: Primary | ICD-10-CM

## 2024-03-04 PROCEDURE — 99213 OFFICE O/P EST LOW 20 MIN: CPT | Mod: PBBFAC

## 2024-03-04 RX ORDER — SACUBITRIL AND VALSARTAN 97; 103 MG/1; MG/1
1 TABLET, FILM COATED ORAL 2 TIMES DAILY
COMMUNITY
End: 2024-03-04 | Stop reason: SDUPTHER

## 2024-03-04 RX ORDER — SACUBITRIL AND VALSARTAN 97; 103 MG/1; MG/1
1 TABLET, FILM COATED ORAL 2 TIMES DAILY
Qty: 180 TABLET | Refills: 3 | Status: SHIPPED | OUTPATIENT
Start: 2024-03-04

## 2024-03-04 NOTE — PROGRESS NOTES
Patient here for BP check.  Pt. taking Coreg 6.25 mg BID as directed.  Pt. c/o having SOB on exertion relieved with rest.  Today's /86 HR 62.  BP rechecked manually after waiting 15 minutes /80. BP log will be scanned into chart.  Case presented to Dr. Ricks with new orders to start Entresto 97/103 BID, discontinue Lorsartan and draw BMP in 2 weeks.  Instructed pt to continue to monitor BP and  bring log in 2 weeks.  Patient verbalized undersatanding.

## 2024-03-04 NOTE — TELEPHONE ENCOUNTER
Patient here for BP check.  Pt. taking Coreg 6.25 mg BID as directed.  Pt. c/o having SOB on exertion relieved with rest.  Today's /86 HR 62.  BP rechecked manually after waiting 15 minutes /80. BP log will be scanned into chart.  Case presented to Dr. Ricks with new orders to start Entresto 97/103 BID, discontinue Lorsartan and draw BMP in 2 weeks.  Instructed pt to continue to monitor BP and  bring log in 2 weeks.  Patient verbalized undersatanding

## 2024-03-14 ENCOUNTER — HOSPITAL ENCOUNTER (EMERGENCY)
Facility: HOSPITAL | Age: 44
Discharge: HOME | End: 2024-03-14
Attending: INTERNAL MEDICINE
Payer: MEDICAID

## 2024-03-14 ENCOUNTER — OFFICE VISIT (OUTPATIENT)
Dept: INTERNAL MEDICINE | Facility: CLINIC | Age: 44
End: 2024-03-14
Payer: MEDICAID

## 2024-03-14 ENCOUNTER — TELEPHONE (OUTPATIENT)
Dept: CARDIOLOGY | Facility: CLINIC | Age: 44
End: 2024-03-14
Payer: MEDICAID

## 2024-03-14 VITALS
OXYGEN SATURATION: 98 % | TEMPERATURE: 98 F | HEART RATE: 64 BPM | SYSTOLIC BLOOD PRESSURE: 158 MMHG | DIASTOLIC BLOOD PRESSURE: 90 MMHG | BODY MASS INDEX: 26.99 KG/M2 | RESPIRATION RATE: 18 BRPM | HEIGHT: 71 IN | WEIGHT: 192.81 LBS

## 2024-03-14 VITALS
SYSTOLIC BLOOD PRESSURE: 158 MMHG | OXYGEN SATURATION: 99 % | HEART RATE: 62 BPM | BODY MASS INDEX: 26.88 KG/M2 | DIASTOLIC BLOOD PRESSURE: 98 MMHG | RESPIRATION RATE: 18 BRPM | TEMPERATURE: 99 F | HEIGHT: 71 IN | WEIGHT: 192 LBS

## 2024-03-14 DIAGNOSIS — R07.9 CHEST PAIN IN ADULT: Primary | ICD-10-CM

## 2024-03-14 DIAGNOSIS — F41.9 ANXIETY AND DEPRESSION: ICD-10-CM

## 2024-03-14 DIAGNOSIS — I10 PRIMARY HYPERTENSION: ICD-10-CM

## 2024-03-14 DIAGNOSIS — F32.A ANXIETY AND DEPRESSION: ICD-10-CM

## 2024-03-14 DIAGNOSIS — R07.89 ATYPICAL CHEST PAIN: Primary | ICD-10-CM

## 2024-03-14 DIAGNOSIS — I10 UNCONTROLLED HYPERTENSION: ICD-10-CM

## 2024-03-14 DIAGNOSIS — Z13.31 POSITIVE DEPRESSION SCREENING: ICD-10-CM

## 2024-03-14 DIAGNOSIS — R07.9 CHEST PAIN: ICD-10-CM

## 2024-03-14 DIAGNOSIS — K21.9 GASTROESOPHAGEAL REFLUX DISEASE, UNSPECIFIED WHETHER ESOPHAGITIS PRESENT: ICD-10-CM

## 2024-03-14 LAB
ALBUMIN SERPL-MCNC: 4.4 G/DL (ref 3.5–5)
ALBUMIN/GLOB SERPL: 1.2 RATIO (ref 1.1–2)
ALP SERPL-CCNC: 101 UNIT/L (ref 40–150)
ALT SERPL-CCNC: 24 UNIT/L (ref 0–55)
AST SERPL-CCNC: 18 UNIT/L (ref 5–34)
BASOPHILS # BLD AUTO: 0.06 X10(3)/MCL
BASOPHILS NFR BLD AUTO: 0.7 %
BILIRUB SERPL-MCNC: 0.5 MG/DL
BNP BLD-MCNC: <10 PG/ML
BUN SERPL-MCNC: 14.7 MG/DL (ref 8.9–20.6)
CALCIUM SERPL-MCNC: 10.1 MG/DL (ref 8.4–10.2)
CHLORIDE SERPL-SCNC: 107 MMOL/L (ref 98–107)
CO2 SERPL-SCNC: 26 MMOL/L (ref 22–29)
CREAT SERPL-MCNC: 1.25 MG/DL (ref 0.73–1.18)
EOSINOPHIL # BLD AUTO: 0.13 X10(3)/MCL (ref 0–0.9)
EOSINOPHIL NFR BLD AUTO: 1.6 %
ERYTHROCYTE [DISTWIDTH] IN BLOOD BY AUTOMATED COUNT: 13.4 % (ref 11.5–17)
GFR SERPLBLD CREATININE-BSD FMLA CKD-EPI: >60 MLS/MIN/1.73/M2
GLOBULIN SER-MCNC: 3.6 GM/DL (ref 2.4–3.5)
GLUCOSE SERPL-MCNC: 95 MG/DL (ref 74–100)
HCT VFR BLD AUTO: 48.3 % (ref 42–52)
HGB BLD-MCNC: 16.6 G/DL (ref 14–18)
HOLD SPECIMEN: NORMAL
HOLD SPECIMEN: NORMAL
IMM GRANULOCYTES # BLD AUTO: 0.02 X10(3)/MCL (ref 0–0.04)
IMM GRANULOCYTES NFR BLD AUTO: 0.2 %
LYMPHOCYTES # BLD AUTO: 1.46 X10(3)/MCL (ref 0.6–4.6)
LYMPHOCYTES NFR BLD AUTO: 17.8 %
MAGNESIUM SERPL-MCNC: 1.9 MG/DL (ref 1.6–2.6)
MCH RBC QN AUTO: 30.3 PG (ref 27–31)
MCHC RBC AUTO-ENTMCNC: 34.4 G/DL (ref 33–36)
MCV RBC AUTO: 88.1 FL (ref 80–94)
MONOCYTES # BLD AUTO: 0.77 X10(3)/MCL (ref 0.1–1.3)
MONOCYTES NFR BLD AUTO: 9.4 %
NEUTROPHILS # BLD AUTO: 5.75 X10(3)/MCL (ref 2.1–9.2)
NEUTROPHILS NFR BLD AUTO: 70.3 %
NRBC BLD AUTO-RTO: 0 %
OHS QRS DURATION: 138 MS
OHS QTC CALCULATION: 422 MS
PLATELET # BLD AUTO: 184 X10(3)/MCL (ref 130–400)
PMV BLD AUTO: 10.1 FL (ref 7.4–10.4)
POTASSIUM SERPL-SCNC: 4.4 MMOL/L (ref 3.5–5.1)
PROT SERPL-MCNC: 8 GM/DL (ref 6.4–8.3)
RBC # BLD AUTO: 5.48 X10(6)/MCL (ref 4.7–6.1)
SODIUM SERPL-SCNC: 141 MMOL/L (ref 136–145)
TROPONIN I SERPL-MCNC: <0.01 NG/ML (ref 0–0.04)
WBC # SPEC AUTO: 8.19 X10(3)/MCL (ref 4.5–11.5)

## 2024-03-14 PROCEDURE — 99215 OFFICE O/P EST HI 40 MIN: CPT | Mod: PBBFAC

## 2024-03-14 PROCEDURE — 99285 EMERGENCY DEPT VISIT HI MDM: CPT | Mod: 25,27

## 2024-03-14 PROCEDURE — 3080F DIAST BP >= 90 MM HG: CPT | Mod: CPTII,,,

## 2024-03-14 PROCEDURE — 3008F BODY MASS INDEX DOCD: CPT | Mod: CPTII,,,

## 2024-03-14 PROCEDURE — 4010F ACE/ARB THERAPY RXD/TAKEN: CPT | Mod: CPTII,,,

## 2024-03-14 PROCEDURE — 3077F SYST BP >= 140 MM HG: CPT | Mod: CPTII,,,

## 2024-03-14 PROCEDURE — 80053 COMPREHEN METABOLIC PANEL: CPT | Performed by: PHYSICIAN ASSISTANT

## 2024-03-14 PROCEDURE — 84484 ASSAY OF TROPONIN QUANT: CPT | Performed by: PHYSICIAN ASSISTANT

## 2024-03-14 PROCEDURE — 85025 COMPLETE CBC W/AUTO DIFF WBC: CPT | Performed by: PHYSICIAN ASSISTANT

## 2024-03-14 PROCEDURE — 93005 ELECTROCARDIOGRAM TRACING: CPT

## 2024-03-14 PROCEDURE — 99214 OFFICE O/P EST MOD 30 MIN: CPT | Mod: S$PBB,,,

## 2024-03-14 PROCEDURE — 83880 ASSAY OF NATRIURETIC PEPTIDE: CPT | Performed by: PHYSICIAN ASSISTANT

## 2024-03-14 PROCEDURE — 83735 ASSAY OF MAGNESIUM: CPT | Performed by: PHYSICIAN ASSISTANT

## 2024-03-14 PROCEDURE — 25000003 PHARM REV CODE 250: Performed by: INTERNAL MEDICINE

## 2024-03-14 RX ORDER — CARVEDILOL 6.25 MG/1
6.25 TABLET ORAL 2 TIMES DAILY WITH MEALS
COMMUNITY
End: 2024-04-11 | Stop reason: ALTCHOICE

## 2024-03-14 RX ORDER — OMEPRAZOLE 40 MG/1
40 CAPSULE, DELAYED RELEASE ORAL DAILY
Qty: 30 CAPSULE | Refills: 0 | Status: SHIPPED | OUTPATIENT
Start: 2024-03-14 | End: 2025-03-14

## 2024-03-14 RX ORDER — CLONIDINE HYDROCHLORIDE 0.1 MG/1
0.1 TABLET ORAL
Status: COMPLETED | OUTPATIENT
Start: 2024-03-14 | End: 2024-03-14

## 2024-03-14 RX ORDER — ESCITALOPRAM OXALATE 10 MG/1
10 TABLET ORAL DAILY
Qty: 45 TABLET | Refills: 1 | Status: SHIPPED | OUTPATIENT
Start: 2024-03-14 | End: 2025-03-14

## 2024-03-14 RX ORDER — ALUMINUM HYDROXIDE, MAGNESIUM HYDROXIDE, AND SIMETHICONE 1200; 120; 1200 MG/30ML; MG/30ML; MG/30ML
30 SUSPENSION ORAL
Status: COMPLETED | OUTPATIENT
Start: 2024-03-14 | End: 2024-03-14

## 2024-03-14 RX ADMIN — CLONIDINE HYDROCHLORIDE 0.1 MG: 0.1 TABLET ORAL at 03:03

## 2024-03-14 RX ADMIN — ALUMINUM HYDROXIDE, MAGNESIUM HYDROXIDE, AND DIMETHICONE 30 ML: 200; 20; 200 SUSPENSION ORAL at 02:03

## 2024-03-14 NOTE — ED PROVIDER NOTES
Encounter Date: 3/14/2024       History     Chief Complaint   Patient presents with    Chest Pain     Sent by PCP after telling them he has been having left arm heaviness x 1 week with Left sided CP and nausea. Hx of HF.      Presents with chest pain for 1 week, Hx of CAD, CHF, HTN.  States pain is sharp, left upper anterior chest radiating to his arm for the last week. Also burning neck pain. Denies SOB, vomiting or swelling.     The history is provided by the patient.     Review of patient's allergies indicates:  No Known Allergies  Past Medical History:   Diagnosis Date    Cerebrovascular disease     CHF (congestive heart failure)     HLD (hyperlipidemia)     Hypertension     Lumbar herniated disc     Thrombus     LEFT VENTRICULAR     Past Surgical History:   Procedure Laterality Date    SPINE SURGERY  lower fusion     Family History   Problem Relation Age of Onset    Hypertension Mother     Diabetes Mother     Stroke Mother     Hyperlipidemia Mother     Lung cancer Father     Schizophrenia Father     Hypertension Sister     Stroke Sister     Heart attack Maternal Grandfather     Heart attack Paternal Grandfather      Social History     Tobacco Use    Smoking status: Every Day     Types: Cigars    Smokeless tobacco: Former    Tobacco comments:     Smokes 3 cigars a day   Substance Use Topics    Alcohol use: Not Currently    Drug use: Never     Review of Systems   Cardiovascular:  Positive for chest pain.       Physical Exam     Initial Vitals [03/14/24 1301]   BP Pulse Resp Temp SpO2   (!) 159/88 60 18 98 °F (36.7 °C) 98 %      MAP       --         Physical Exam    Nursing note and vitals reviewed.  Constitutional: He appears well-developed and well-nourished. No distress.   HENT:   Head: Normocephalic and atraumatic.   Eyes: Conjunctivae and EOM are normal. Pupils are equal, round, and reactive to light.   Neck: Neck supple. No thyromegaly present. No tracheal deviation present. No JVD present.   Normal range of  motion.  Cardiovascular:  Normal rate, regular rhythm, normal heart sounds and intact distal pulses.           Pulmonary/Chest: Breath sounds normal. No respiratory distress.   Abdominal: Abdomen is soft. Bowel sounds are normal. He exhibits no distension. There is no abdominal tenderness. There is no rebound and no guarding.   Musculoskeletal:         General: No edema. Normal range of motion.      Cervical back: Normal range of motion and neck supple.     Neurological: He is alert and oriented to person, place, and time. He has normal strength. GCS score is 15. GCS eye subscore is 4. GCS verbal subscore is 5. GCS motor subscore is 6.   Skin: Skin is warm and dry. No rash noted.   Psychiatric: His behavior is normal.         ED Course   Procedures  Labs Reviewed   COMPREHENSIVE METABOLIC PANEL - Abnormal; Notable for the following components:       Result Value    Creatinine 1.25 (*)     Globulin 3.6 (*)     All other components within normal limits   TROPONIN I - Normal   B-TYPE NATRIURETIC PEPTIDE - Normal   MAGNESIUM - Normal   CBC W/ AUTO DIFFERENTIAL    Narrative:     The following orders were created for panel order CBC auto differential.  Procedure                               Abnormality         Status                     ---------                               -----------         ------                     CBC with Differential[[id number]]                           Final result                 Please view results for these tests on the individual orders.   CBC WITH DIFFERENTIAL   EXTRA TUBES    Narrative:     The following orders were created for panel order EXTRA TUBES.  Procedure                               Abnormality         Status                     ---------                               -----------         ------                     Light Blue Top Hold[[id number]]                             Final result               Gold Top Hold[[id number]]                                   Final result                  Please view results for these tests on the individual orders.   LIGHT BLUE TOP HOLD   GOLD TOP HOLD     EKG Readings: (Independently Interpreted)   Initial Reading: No STEMI. Rhythm: Normal Sinus Rhythm. Heart Rate: 62. Ectopy: No Ectopy. Conduction: Non specific widening QRS. ST Segments: Normal ST Segments. T Waves: Normal. Axis: Left Axis Deviation. Clinical Impression: Normal Sinus Rhythm     ECG Results              EKG 12-lead (In process)        Collection Time Result Time QRS Duration OHS QTC Calculation    03/14/24 13:23:42 03/14/24 13:39:24 138 422                     In process by Interface, Lab In [facility] (03/14/24 13:39:34)                   Narrative:    Test Reason : R07.9,    Vent. Rate : 062 BPM     Atrial Rate : 062 BPM     P-R Int : 192 ms          QRS Dur : 138 ms      QT Int : 416 ms       P-R-T Axes : 043 -55 010 degrees     QTc Int : 422 ms    Normal sinus rhythm  Left axis deviation  LVH with QRS widening  Abnormal ECG  No previous ECGs available    Referred By: [email or web address]   SELF           Confirmed By:                                   Imaging Results              X-Ray Chest PA And Lateral (Final result)  Result time 03/14/24 15:47:41      Final result by Kenny Rowe MD (03/14/24 15:47:41)                   Impression:      No acute pulmonary process appreciated.      Electronically signed by: Kenny Rowe  Date:    03/14/2024  Time:    15:47               Narrative:    EXAMINATION:  XR CHEST PA AND LATERAL    CLINICAL HISTORY:  Chest pain, unspecified    TECHNIQUE:  PA and lateral radiographs of the chest    COMPARISON:  None    FINDINGS:  Normal cardiac silhouette. The lungs are well-inflated. No consolidation identified. No pleural effusion or discernible pneumothorax.                                    X-Rays:   Independently Interpreted Readings:   Chest X-Ray: Normal heart size.  No infiltrates.  No acute abnormalities.     Medications   aluminum-magnesium  hydroxide-simethicone 200-200-20 mg/5 mL suspension 30 mL (30 mLs Oral Given 3/14/24 1442)   cloNIDine tablet 0.1 mg (0.1 mg Oral Given 3/14/24 1518)     Medical Decision Making  Amount and/or Complexity of Data Reviewed  Labs: ordered. Decision-making details documented in ED Course.  Radiology: ordered and independent interpretation performed. Decision-making details documented in ED Course.  ECG/medicine tests: ordered and independent interpretation performed. Decision-making details documented in ED Course.    Risk  OTC drugs.  Prescription drug management.      Additional MDM:   Differential Diagnosis:   Miocardial infarction, pneumothorax, aortic dissection, pulmonary emboli, pneumonia, among others                                     Clinical Impression:  Final diagnoses:  [R07.9] Chest pain  [R07.89] Atypical chest pain (Primary)  [K21.9] Gastroesophageal reflux disease, unspecified whether esophagitis present  [I10] Uncontrolled hypertension          ED Disposition Condition    Discharge Stable          ED Prescriptions       Medication Sig Dispense Start Date End Date Auth. Provider    omeprazole (PRILOSEC) 40 MG capsule Take 1 capsule (40 mg total) by mouth once daily. 30 capsule 3/14/2024 3/14/2025 Bobby Jean MD          Follow-up Information       Follow up With Specialties Details Why Contact Info    Alfreda Jones, FNP Family Medicine Schedule an appointment as soon as possible for a visit in 2 weeks  [address] [facility] [address]  [phone]      [facility] Emergency Medicine  If symptoms worsen [address] [facility] [address] [phone]             Bobby Jean MD  03/14/24 3114

## 2024-03-14 NOTE — FIRST PROVIDER EVALUATION
"Medical screening examination initiated.  I have conducted a focused provider triage encounter, findings are as follows:    Brief history of present illness:  43 y.o. with a history of CHF (EF was 20%, now 40%) who presents from PCP office for evaluation of chest pain and left arm "heaviness". Symptoms x 1 week.     There were no vitals filed for this visit.    Pertinent physical exam:  resting comfortably in NAD.     Brief workup plan:  EKG, labs    Preliminary workup initiated; this workup will be continued and followed by the physician or advanced practice provider that is assigned to the patient when roomed.  "

## 2024-03-14 NOTE — TELEPHONE ENCOUNTER
Patient's fiance called stating pt was seen on the 4th of March 2024, and rx for ENTRESTO was sent to his pharmacy, but pt hasn't been able to take med because the pharmacy has been waiting on an ICD code to be able to fill. States patient hasn't begun his medication and pharmacist states they've faxed request for code twice.

## 2024-03-14 NOTE — ASSESSMENT & PLAN NOTE
C/o intermittent chest pain and left arm heaviness x1 week. He is currently hypertensive - 158/90.  He is tranported via wheelchair to [facility] ED.   Report called - spoke with CAMILO Sandy.

## 2024-03-14 NOTE — ASSESSMENT & PLAN NOTE
3/14/2024    12:18 PM 3/4/2024    11:38 AM 2/1/2024     3:25 PM 9/18/2023     8:08 AM 2/20/2023     1:57 PM 11/30/2022     7:47 AM 9/14/2022    11:48 AM   Depression Patient Health Questionnaire   Over the last two weeks how often have you been bothered by little interest or pleasure in doing things More than half the days Several days Not at all Several days Not at all Several days Not at all   Over the last two weeks how often have you been bothered by feeling down, depressed or hopeless Several days Several days Not at all Several days Several days More than half the days Not at all   PHQ-2 Total Score 3 2 0 2 1 3 0   Over the last two weeks how often have you been bothered by trouble falling or staying asleep, or sleeping too much Several days     Several days    Over the last two weeks how often have you been bothered by feeling tired or having little energy Several days     Nearly every day    Over the last two weeks how often have you been bothered by a poor appetite or overeating Several days     Several days    Over the last two weeks how often have you been bothered by feeling bad about yourself - or that you are a failure or have let yourself or your family down Several days     Several days    Over the last two weeks how often have you been bothered by trouble concentrating on things, such as reading the newspaper or watching television More than half the days     Nearly every day    Over the last two weeks how often have you been bothered by moving or speaking so slowly that other people could have noticed. Or the opposite - being so fidgety or restless that you have been moving around a lot more than usual. More than half the days     More than half the days    Over the last two weeks how often have you been bothered by thoughts that you would be better off dead, or of hurting yourself Not at all     Not at all    If you checked off any problems, how difficult have these problems made it for you to  "do your work, take care of things at home or get along with other people? Extremely difficult     Extremely difficult    PHQ-9 Score 11     14    PHQ-9 Interpretation Moderate     Moderate      ERIK-7 Score: 16  Interpretation: Severe Anxiety   Denies SI/HI  D/c duloxetine - "too drowsy."  Rx lexapro.   Declines referral to .  RTC in 4 weeks for evaluation.  Read positive daily meditations, avoid negative media, set healthy boundaries.   Exercise daily, keep consistent sleep pattern, eat a healthy diet.   Establish good social support, make changes to reduce stress.   Do not drink alcohol or use illicit drugs.   Reports any symptoms of suicidal/homicidal ideations or self harm immediately, go to nearest emergency room.     "

## 2024-03-14 NOTE — TELEPHONE ENCOUNTER
I called the pharmacy and provided the ICD 10 code for Entresto. The pharmacy was able to process the prescription. Patient's fiance notified.

## 2024-03-14 NOTE — PROGRESS NOTES
"    PATIENT NAME: Jeff Sanchez Jr.  : 1980  DATE: 3/14/24  MRN: [id number]          Reason for Visit/Chief Complaint   Follow-up, Anxiety, Depression, Chest Pain, and Hypertension       History of Present Illness (HPI)     Jeff Sanchez Jr. is a 43 y.o. White male presenting in clinic today for Follow-up, Anxiety, Depression, Chest Pain, and Hypertension. PMH: CHF (EF less than 20% on 5/15/2022, 40% on 2024), left ventricular thrombus (no longer on warfarin), and lumbar radiculopathy with laminectomy, decompression, fusion L4-5-S1. Of note, he is no longer taking warfarin, and is taking a low dose ASA. Family on mother and father's side has a strong cardiac history. Patient's younger sister has suffered a stroke previously. He is currently followed by [facility] cardiology clinic - last office visit 2024.      BP: 159/88, 158/90 - not at goal.  Patient reports feeling intermittent chest pain with left arm heaviness x1 week. He also reports some "neck discomfort." States "I don't feel right." Denies SOB, HA, dizziness, or vision changes. He was prescribed Entresto, but was awaiting ICD code from cardiology which was called in today per cardiology clinic.     He reports feeling depressed, withdrawn. States he does not like to be around people or engage in conversations. He attributes some of his symptoms due to his health problems. Was previously treated with buspirone and hydroxyzine, but he self discontinued. He is amendable for medication management. He was started on cymbalta 30 mg. Today, PHQ9-11 and GAD7-16. States cymbalta "made me too drowsy." Continues to deny SI/HI.      Patient states he vapes. Denies alcohol or illicit drug use. Denies chest pain, shortness of breath, cough, headache, dizziness, weakness, abdominal pain, nausea, vomiting, diarrhea, constipation, dysuria, SI, and HI.     Prostate Cancer Screening - 2023 - PSA: 0.55. Follow up annually.   Colon Cancer Screening - Deferred due " to age. Will initiate testing at age 45.  Eye Exam -Several years. List of local eye doctors given to patient.   Dental Exam - Several years. List of local dentists given to patient.   Vaccinations: Flu - Declines / Pneumococcal - Declines / Tetanus - Declines /        Review of Systems     Review of Systems   Constitutional:  Positive for fatigue.   HENT: Negative.     Eyes: Negative.    Respiratory: Negative.  Negative for shortness of breath.    Cardiovascular:  Positive for chest pain.   Gastrointestinal: Negative.    Endocrine: Negative.    Genitourinary: Negative.    Musculoskeletal: Negative.    Skin: Negative.    Allergic/Immunologic: Negative.    Neurological: Negative.  Negative for dizziness.   Hematological: Negative.    Psychiatric/Behavioral: Negative.     All other systems reviewed and are negative.      Medical / Social / Family History     Past Medical History:   Diagnosis Date    Cerebrovascular disease     CHF (congestive heart failure)     HLD (hyperlipidemia)     Hypertension     Lumbar herniated disc     Thrombus     LEFT VENTRICULAR         Past Surgical History:   Procedure Laterality Date    SPINE SURGERY  lower fusion         Social History  Jeff Sanchez Jr.'s  reports that he has been smoking cigars. He has quit using smokeless tobacco. He reports that he does not currently use alcohol. He reports that he does not use drugs.    Family History  Jeff Sanchez Jr.'s family history includes Diabetes in his mother; Heart attack in his maternal grandfather and paternal grandfather; Hyperlipidemia in his mother; Hypertension in his mother and sister; Lung cancer in his father; Schizophrenia in his father; Stroke in his mother and sister.    Medications and Allergies     Medications  Current Outpatient Medications   Medication Instructions    aspirin (ECOTRIN) 81 mg, Oral, Daily    atorvastatin (LIPITOR) 40 mg, Oral, Nightly    carvediloL (COREG) 6.25 mg, Oral, 2 times daily with meals     "EScitalopram oxalate (LEXAPRO) 10 mg, Oral, Daily    furosemide (LASIX) 40 mg, Oral, Daily    methocarbamoL (ROBAXIN) 500 mg, Oral, 3 times daily PRN    sacubitriL-valsartan (ENTRESTO)  mg per tablet 1 tablet, Oral, 2 times daily       Allergies  Review of patient's allergies indicates:  No Known Allergies    Physical Examination   Visit Vitals  BP (!) 158/90 (BP Location: Right arm, Patient Position: Sitting, BP Method: Large (Manual))   Pulse 64   Temp 98.3 °F (36.8 °C) (Oral)   Resp 18   Ht 5' 11" (1.803 m)   Wt 87.5 kg (192 lb 12.8 oz)   SpO2 98%   BMI 26.89 kg/m²     Physical Exam  Vitals reviewed.   Constitutional:       Appearance: Normal appearance. He is normal weight.   HENT:      Head: Normocephalic.      Nose: Nose normal.      Mouth/Throat:      Mouth: Mucous membranes are moist.      Pharynx: Oropharynx is clear.   Eyes:      Extraocular Movements: Extraocular movements intact.      Conjunctiva/sclera: Conjunctivae normal.      Pupils: Pupils are equal, round, and reactive to light.   Cardiovascular:      Rate and Rhythm: Normal rate and regular rhythm.      Heart sounds: Normal heart sounds.   Pulmonary:      Effort: Pulmonary effort is normal.      Breath sounds: Normal breath sounds.   Abdominal:      General: Abdomen is flat. Bowel sounds are normal.      Palpations: Abdomen is soft.   Musculoskeletal:         General: Normal range of motion.      Cervical back: Normal range of motion.      Right lower leg: No edema.      Left lower leg: No edema.   Skin:     General: Skin is warm and dry.      Capillary Refill: Capillary refill takes less than 2 seconds.   Neurological:      General: No focal deficit present.      Mental Status: He is alert and oriented to person, place, and time.   Psychiatric:         Mood and Affect: Mood normal.           Results     Lab Results   Component Value Date    WBC 8.41 08/03/2023    RBC 5.29 08/03/2023    HGB 15.6 08/03/2023    HCT 47.0 08/03/2023    MCV 88.8 " "08/03/2023    MCH 29.5 08/03/2023    MCHC 33.2 08/03/2023    RDW 14.0 08/03/2023     08/03/2023    MPV 10.2 08/03/2023      Lab Results   Component Value Date     08/03/2023    K 4.2 08/03/2023    CHLORIDE 107 08/03/2023    CO2 26 08/03/2023    GLUCOSE 117 (H) 08/03/2023    BUN 15.3 08/03/2023    CREATININE 1.21 (H) 08/03/2023    LABPROT 7.6 08/03/2023    ALBUMIN 4.3 08/03/2023    BILITOT 0.5 08/03/2023    ALKPHOS 92 08/03/2023    AST 23 08/03/2023    ALT 38 08/03/2023    EGFRNORACEVR >60 08/03/2023     Lab Results   Component Value Date    TSH 0.623 05/22/2023    T4FREE 1.28 03/30/2022     Lab Results   Component Value Date    CHOL 122 08/03/2023    HDL 24 (L) 08/03/2023    LDL 51.00 08/03/2023    TRIG 235 (H) 08/03/2023     Lab Results   Component Value Date    COLORUA Yellow 08/03/2023    SGUA 1.027 08/03/2023    PROTEINUA Trace (A) 08/03/2023    GLUCOSEUA Normal 08/03/2023    BILIRUBINUA Negative 08/03/2023    BLOODUA Negative 08/03/2023    WBCUA 0-5 08/03/2023    RBCUA 0-5 08/03/2023    BACTERIA None Seen 08/03/2023    NITRITESUA Negative 08/03/2023    LEUKOCYTESUR Negative 08/03/2023    UROBILINOGEN Normal 08/03/2023     Lab Results   Component Value Date    CREATRANDUR 244.1 (H) 08/03/2023    MICALBCREAT 25.3 08/03/2023     Lab Results   Component Value Date    VUWIVOAT03AS 38.2 05/22/2023     Lab Results   Component Value Date    HIV Nonreactive 05/22/2023    HEPAIGM Nonreactive 05/22/2023    HEPBCOREM Nonreactive 05/22/2023    HEPCAB Nonreactive 05/22/2023     No results found for: "FITDIAG", "COLOGUARD"  No results found for: "OCCBLDIA"    Assessment and Plan (including Health Maintenance)     Problem List Items Addressed This Visit          Psychiatric    Anxiety and depression    Current Assessment & Plan         3/14/2024    12:18 PM 3/4/2024    11:38 AM 2/1/2024     3:25 PM 9/18/2023     8:08 AM 2/20/2023     1:57 PM 11/30/2022     7:47 AM 9/14/2022    11:48 AM   Depression Patient Health " Questionnaire   Over the last two weeks how often have you been bothered by little interest or pleasure in doing things More than half the days Several days Not at all Several days Not at all Several days Not at all   Over the last two weeks how often have you been bothered by feeling down, depressed or hopeless Several days Several days Not at all Several days Several days More than half the days Not at all   PHQ-2 Total Score 3 2 0 2 1 3 0   Over the last two weeks how often have you been bothered by trouble falling or staying asleep, or sleeping too much Several days     Several days    Over the last two weeks how often have you been bothered by feeling tired or having little energy Several days     Nearly every day    Over the last two weeks how often have you been bothered by a poor appetite or overeating Several days     Several days    Over the last two weeks how often have you been bothered by feeling bad about yourself - or that you are a failure or have let yourself or your family down Several days     Several days    Over the last two weeks how often have you been bothered by trouble concentrating on things, such as reading the newspaper or watching television More than half the days     Nearly every day    Over the last two weeks how often have you been bothered by moving or speaking so slowly that other people could have noticed. Or the opposite - being so fidgety or restless that you have been moving around a lot more than usual. More than half the days     More than half the days    Over the last two weeks how often have you been bothered by thoughts that you would be better off dead, or of hurting yourself Not at all     Not at all    If you checked off any problems, how difficult have these problems made it for you to do your work, take care of things at home or get along with other people? Extremely difficult     Extremely difficult    PHQ-9 Score 11     14    PHQ-9 Interpretation Moderate      "Moderate    ERIK-7 Score: 16  Interpretation: Severe Anxiety   Denies SI/HI  D/c duloxetine - "too drowsy."  Rx lexapro.   Declines referral to [facility]  RTC in 4 weeks for evaluation.  Read positive daily meditations, avoid negative media, set healthy boundaries.   Exercise daily, keep consistent sleep pattern, eat a healthy diet.   Establish good social support, make changes to reduce stress.   Do not drink alcohol or use illicit drugs.   Reports any symptoms of suicidal/homicidal ideations or self harm immediately, go to nearest emergency room.            Relevant Medications    EScitalopram oxalate (LEXAPRO) 10 MG tablet       Cardiac/Vascular    Primary hypertension    Chest pain in adult - Primary    Current Assessment & Plan     C/o intermittent chest pain and left arm heaviness x1 week. He is currently hypertensive - 158/90.  He is tranported via wheelchair to [facility].   Report called - spoke with CAMILO Sandy.            Endocrine    BMI 26.0-26.9,adult       Other    Positive depression screening    Overview     I have reviewed the positive depression score which warrants active treatment with psychotherapy and/or medications.               Health Maintenance Due   Topic Date Due    COVID-19 Vaccine (1) Never done     Tests to Keep You Healthy    Last Blood Pressure <= 139/89 (3/14/2024): NO  Tobacco Cessation: NO      Health Maintenance Topics with due status: Not Due       Topic Last Completion Date    Hemoglobin A1c (Diabetic Prevention Screening) 05/22/2023    Lipid Panel 08/03/2023    High Dose Statin 03/14/2024       Future Appointments   Date Time Provider Department Center   4/11/2024  2:00 PM Alfreda Jones FNP [facility] LETICIA [place]   5/30/2024  2:00 PM Larry Martinez MD [facility] J[facility]MYN [place]        Follow up in about 4 weeks (around 4/11/2024) for F2F, Follow up, Med check, Lab review, RTC PRN, HTN.          Signature:        KELVIN Pineda  [facility] INTERNAL MEDICINE  [address] [address]    Date of encounter: 3/14/24

## 2024-03-27 ENCOUNTER — TELEPHONE (OUTPATIENT)
Dept: CARDIOLOGY | Facility: CLINIC | Age: 44
End: 2024-03-27
Payer: MEDICAID

## 2024-04-01 NOTE — TELEPHONE ENCOUNTER
2nd   Attempted to contact patient to give apt date/time of sooner apt but no answer and no vm. Will place apt letter to be mailed out and will try call back later.     
Attempted to contact patient to give apt date/time of sooner apt but no answer and no vm. Per patient's PCP, pt was recently seen in ED and would like to move pt's 05/2024 apt sooner. Will try to call patient again.  
no

## 2024-04-11 ENCOUNTER — OFFICE VISIT (OUTPATIENT)
Dept: INTERNAL MEDICINE | Facility: CLINIC | Age: 44
End: 2024-04-11
Payer: MEDICAID

## 2024-04-11 VITALS
SYSTOLIC BLOOD PRESSURE: 154 MMHG | BODY MASS INDEX: 26.23 KG/M2 | TEMPERATURE: 98 F | HEIGHT: 71 IN | OXYGEN SATURATION: 100 % | RESPIRATION RATE: 18 BRPM | HEART RATE: 57 BPM | WEIGHT: 187.38 LBS | DIASTOLIC BLOOD PRESSURE: 78 MMHG

## 2024-04-11 DIAGNOSIS — F41.9 ANXIETY AND DEPRESSION: Primary | ICD-10-CM

## 2024-04-11 DIAGNOSIS — F32.A ANXIETY AND DEPRESSION: Primary | ICD-10-CM

## 2024-04-11 DIAGNOSIS — I10 PRIMARY HYPERTENSION: ICD-10-CM

## 2024-04-11 PROCEDURE — 99213 OFFICE O/P EST LOW 20 MIN: CPT | Mod: S$PBB,,,

## 2024-04-11 PROCEDURE — 3008F BODY MASS INDEX DOCD: CPT | Mod: CPTII,,,

## 2024-04-11 PROCEDURE — 1160F RVW MEDS BY RX/DR IN RCRD: CPT | Mod: CPTII,,,

## 2024-04-11 PROCEDURE — 1159F MED LIST DOCD IN RCRD: CPT | Mod: CPTII,,,

## 2024-04-11 PROCEDURE — 99215 OFFICE O/P EST HI 40 MIN: CPT | Mod: PBBFAC

## 2024-04-11 PROCEDURE — 4010F ACE/ARB THERAPY RXD/TAKEN: CPT | Mod: CPTII,,,

## 2024-04-11 PROCEDURE — 3077F SYST BP >= 140 MM HG: CPT | Mod: CPTII,,,

## 2024-04-11 PROCEDURE — 3078F DIAST BP <80 MM HG: CPT | Mod: CPTII,,,

## 2024-04-11 RX ORDER — DULOXETIN HYDROCHLORIDE 30 MG/1
30 CAPSULE, DELAYED RELEASE ORAL DAILY
COMMUNITY
Start: 2024-03-29 | End: 2024-04-11 | Stop reason: ALTCHOICE

## 2024-04-11 RX ORDER — CARVEDILOL 3.12 MG/1
6.25 TABLET ORAL 2 TIMES DAILY
COMMUNITY
Start: 2024-04-02 | End: 2024-04-18

## 2024-04-11 NOTE — ASSESSMENT & PLAN NOTE
4/11/2024     3:00 PM 3/14/2024    12:18 PM 3/4/2024    11:38 AM 2/1/2024     3:25 PM 9/18/2023     8:08 AM 2/20/2023     1:57 PM 11/30/2022     7:47 AM   Depression Patient Health Questionnaire   Over the last two weeks how often have you been bothered by little interest or pleasure in doing things Not at all More than half the days Several days Not at all Several days Not at all Several days   Over the last two weeks how often have you been bothered by feeling down, depressed or hopeless Not at all Several days Several days Not at all Several days Several days More than half the days   PHQ-2 Total Score 0 3 2 0 2 1 3   Over the last two weeks how often have you been bothered by trouble falling or staying asleep, or sleeping too much Not at all Several days     Several days   Over the last two weeks how often have you been bothered by feeling tired or having little energy Several days Several days     Nearly every day   Over the last two weeks how often have you been bothered by a poor appetite or overeating Not at all Several days     Several days   Over the last two weeks how often have you been bothered by feeling bad about yourself - or that you are a failure or have let yourself or your family down Several days Several days     Several days   Over the last two weeks how often have you been bothered by trouble concentrating on things, such as reading the newspaper or watching television More than half the days More than half the days     Nearly every day   Over the last two weeks how often have you been bothered by moving or speaking so slowly that other people could have noticed. Or the opposite - being so fidgety or restless that you have been moving around a lot more than usual. Not at all More than half the days     More than half the days   Over the last two weeks how often have you been bothered by thoughts that you would be better off dead, or of hurting yourself Not at all Not at all     Not at all    If you checked off any problems, how difficult have these problems made it for you to do your work, take care of things at home or get along with other people? Not difficult at all Extremely difficult     Extremely difficult   PHQ-9 Score 4 11     14   PHQ-9 Interpretation Minimal or None Moderate     Moderate     ERIK-7 Score: 7  Interpretation: Mild Anxiety   Denies SI/HI  Continue lexapro as prescribed.  Read positive daily meditations, avoid negative media, set healthy boundaries.   Exercise daily, keep consistent sleep pattern, eat a healthy diet.   Establish good social support, make changes to reduce stress.   Do not drink alcohol or use illicit drugs.   Reports any symptoms of suicidal/homicidal ideations or self harm immediately, go to nearest emergency room.

## 2024-04-11 NOTE — ASSESSMENT & PLAN NOTE
BP Readings from Last 3 Encounters:   04/11/24 (!) 154/78   03/14/24 (!) 158/98   03/14/24 (!) 158/90      Not at goal.  Follow a low sodium (less than 2 grams of sodium per day), DASH diet.   Continue losartan and carvedilol as prescribed.  Bring BP log to cardiology clinic.  Monitor blood pressure and report any consistent values greater than 140/90 and keep a log.  Encouraged smoking cessation to aid in BP reduction and co-morbidities.   Maintain healthy weight with a BMI goal of <30.   Aerobic exercise for 150 minutes per week (or 5 days a week for 30 minutes each day).

## 2024-04-11 NOTE — PROGRESS NOTES
"    PATIENT NAME: Jeff Sanchez Jr.  : 1980  DATE: 24  MRN: [id number]          Reason for Visit/Chief Complaint   Follow-up, Anxiety, and Depression       History of Present Illness (HPI)     Jeff Sanchez Jr. is a 43 y.o. White male presenting in clinic today for Follow-up, Anxiety, and Depression. PMH: CHF (EF less than 20% on 5/15/2022, 40% on 2024), left ventricular thrombus (no longer on warfarin), and lumbar radiculopathy with laminectomy, decompression, fusion L4-5-S1. Of note, he is no longer taking warfarin, and is taking a low dose ASA. Family on mother and father's side has a strong cardiac history. Patient's younger sister has suffered a stroke previously. He is currently followed by [facility] cardiology clinic - last office visit 2024.      BP: 154/78 - not at goal. Asymptomatic. Denies any further CP symptoms since evaluated by ED at last office visit.    He reports feeling depressed, withdrawn. States he does not like to be around people or engage in conversations. He attributes some of his symptoms due to his health problems. Was previously treated with buspirone and hydroxyzine, but he self discontinued. He is amendable for medication management. He was started on cymbalta 30 mg. (3/14/2024) PHQ9-11 and GAD7-16. States cymbalta "made me too drowsy." Continues to deny SI/HI. Duloxetine was discontinued and he was started on lexapro. Today, PHQ9-4 and GAD7-7. He reports "feeling great." States he is no longer "flipping out" about certain situations.      Patient states he vapes. Denies alcohol or illicit drug use. Denies chest pain, shortness of breath, cough, headache, dizziness, weakness, abdominal pain, nausea, vomiting, diarrhea, constipation, dysuria, SI, and HI.     Prostate Cancer Screening - 2023 - PSA: 0.55. Follow up annually.   Colon Cancer Screening - Deferred due to age. Will initiate testing at age 45.  Eye Exam -Several years. List of local eye doctors given to " patient.   Dental Exam - Several years. List of local dentists given to patient.   Vaccinations: Flu - Declines / Pneumococcal - Declines / Tetanus - Declines /        Review of Systems     Review of Systems   HENT: Negative.     Eyes: Negative.    Respiratory: Negative.  Negative for shortness of breath.    Cardiovascular: Negative.    Gastrointestinal: Negative.    Endocrine: Negative.    Genitourinary: Negative.    Musculoskeletal: Negative.    Skin: Negative.    Allergic/Immunologic: Negative.    Neurological: Negative.  Negative for dizziness.   Hematological: Negative.    Psychiatric/Behavioral: Negative.     All other systems reviewed and are negative.      Medical / Social / Family History     Past Medical History:   Diagnosis Date    Cerebrovascular disease     CHF (congestive heart failure)     HLD (hyperlipidemia)     Hypertension     Lumbar herniated disc     Thrombus     LEFT VENTRICULAR         Past Surgical History:   Procedure Laterality Date    SPINE SURGERY  lower fusion         Social History  Jeff Sanchez Jr.'s  reports that he has been smoking cigars. He has quit using smokeless tobacco. He reports that he does not currently use alcohol. He reports that he does not use drugs.    Family History  Jeff Sanchez Jr.'s family history includes Diabetes in his mother; Heart attack in his maternal grandfather and paternal grandfather; Hyperlipidemia in his mother; Hypertension in his mother and sister; Lung cancer in his father; Schizophrenia in his father; Stroke in his mother and sister.    Medications and Allergies     Medications  Current Outpatient Medications   Medication Instructions    aspirin (ECOTRIN) 81 mg, Oral, Daily    atorvastatin (LIPITOR) 40 mg, Oral, Nightly    carvediloL (COREG) 6.25 mg, Oral, 2 times daily    EScitalopram oxalate (LEXAPRO) 10 mg, Oral, Daily    furosemide (LASIX) 40 mg, Daily    methocarbamoL (ROBAXIN) 500 mg, Oral, 3 times daily PRN    omeprazole (PRILOSEC) 40  "mg, Oral, Daily    sacubitriL-valsartan (ENTRESTO)  mg per tablet 1 tablet, Oral, 2 times daily       Allergies  Review of patient's allergies indicates:  No Known Allergies    Physical Examination   Visit Vitals  BP (!) 154/78 (BP Location: Left arm, Patient Position: Sitting, BP Method: Large (Manual))   Pulse (!) 57   Temp 98.1 °F (36.7 °C) (Oral)   Resp 18   Ht 5' 11" (1.803 m)   Wt 85 kg (187 lb 6.3 oz)   SpO2 100%   BMI 26.14 kg/m²     Physical Exam  Vitals reviewed.   Constitutional:       Appearance: Normal appearance. He is normal weight.   HENT:      Head: Normocephalic.      Nose: Nose normal.      Mouth/Throat:      Mouth: Mucous membranes are moist.      Pharynx: Oropharynx is clear.   Eyes:      Extraocular Movements: Extraocular movements intact.      Conjunctiva/sclera: Conjunctivae normal.      Pupils: Pupils are equal, round, and reactive to light.   Cardiovascular:      Rate and Rhythm: Normal rate and regular rhythm.      Heart sounds: Normal heart sounds.   Pulmonary:      Effort: Pulmonary effort is normal.      Breath sounds: Normal breath sounds.   Abdominal:      General: Abdomen is flat. Bowel sounds are normal.      Palpations: Abdomen is soft.   Musculoskeletal:         General: Normal range of motion.      Cervical back: Normal range of motion.      Right lower leg: No edema.      Left lower leg: No edema.   Skin:     General: Skin is warm and dry.      Capillary Refill: Capillary refill takes less than 2 seconds.   Neurological:      General: No focal deficit present.      Mental Status: He is alert and oriented to person, place, and time.   Psychiatric:         Mood and Affect: Mood normal.           Results     Lab Results   Component Value Date    WBC 8.19 03/14/2024    RBC 5.48 03/14/2024    HGB 16.6 03/14/2024    HCT 48.3 03/14/2024    MCV 88.1 03/14/2024    MCH 30.3 03/14/2024    MCHC 34.4 03/14/2024    RDW 13.4 03/14/2024     03/14/2024    MPV 10.1 03/14/2024    " "  Lab Results   Component Value Date     03/14/2024    K 4.4 03/14/2024    CHLORIDE 107 03/14/2024    CO2 26 03/14/2024    GLUCOSE 95 03/14/2024    BUN 14.7 03/14/2024    CREATININE 1.25 (H) 03/14/2024    LABPROT 8.0 03/14/2024    ALBUMIN 4.4 03/14/2024    BILITOT 0.5 03/14/2024    ALKPHOS 101 03/14/2024    AST 18 03/14/2024    ALT 24 03/14/2024    EGFRNORACEVR >60 03/14/2024     Lab Results   Component Value Date    TSH 0.623 05/22/2023    T4FREE 1.28 03/30/2022     Lab Results   Component Value Date    CHOL 122 08/03/2023    HDL 24 (L) 08/03/2023    LDL 51.00 08/03/2023    TRIG 235 (H) 08/03/2023     Lab Results   Component Value Date    COLORUA Yellow 08/03/2023    SGUA 1.027 08/03/2023    PROTEINUA Trace (A) 08/03/2023    GLUCOSEUA Normal 08/03/2023    BILIRUBINUA Negative 08/03/2023    BLOODUA Negative 08/03/2023    WBCUA 0-5 08/03/2023    RBCUA 0-5 08/03/2023    BACTERIA None Seen 08/03/2023    NITRITESUA Negative 08/03/2023    LEUKOCYTESUR Negative 08/03/2023    UROBILINOGEN Normal 08/03/2023     Lab Results   Component Value Date    CREATRANDUR 244.1 (H) 08/03/2023    MICALBCREAT 25.3 08/03/2023     Lab Results   Component Value Date    YMYYXROS75CV 38.2 05/22/2023     Lab Results   Component Value Date    HIV Nonreactive 05/22/2023    HEPAIGM Nonreactive 05/22/2023    HEPBCOREM Nonreactive 05/22/2023    HEPCAB Nonreactive 05/22/2023     No results found for: "FITDIAG", "COLOGUARD"  No results found for: "OCCBLDIA"    Assessment and Plan (including Health Maintenance)     Problem List Items Addressed This Visit          Psychiatric    Anxiety and depression - Primary    Current Assessment & Plan         4/11/2024     3:00 PM 3/14/2024    12:18 PM 3/4/2024    11:38 AM 2/1/2024     3:25 PM 9/18/2023     8:08 AM 2/20/2023     1:57 PM 11/30/2022     7:47 AM   Depression Patient Health Questionnaire   Over the last two weeks how often have you been bothered by little interest or pleasure in doing things Not " at all More than half the days Several days Not at all Several days Not at all Several days   Over the last two weeks how often have you been bothered by feeling down, depressed or hopeless Not at all Several days Several days Not at all Several days Several days More than half the days   PHQ-2 Total Score 0 3 2 0 2 1 3   Over the last two weeks how often have you been bothered by trouble falling or staying asleep, or sleeping too much Not at all Several days     Several days   Over the last two weeks how often have you been bothered by feeling tired or having little energy Several days Several days     Nearly every day   Over the last two weeks how often have you been bothered by a poor appetite or overeating Not at all Several days     Several days   Over the last two weeks how often have you been bothered by feeling bad about yourself - or that you are a failure or have let yourself or your family down Several days Several days     Several days   Over the last two weeks how often have you been bothered by trouble concentrating on things, such as reading the newspaper or watching television More than half the days More than half the days     Nearly every day   Over the last two weeks how often have you been bothered by moving or speaking so slowly that other people could have noticed. Or the opposite - being so fidgety or restless that you have been moving around a lot more than usual. Not at all More than half the days     More than half the days   Over the last two weeks how often have you been bothered by thoughts that you would be better off dead, or of hurting yourself Not at all Not at all     Not at all   If you checked off any problems, how difficult have these problems made it for you to do your work, take care of things at home or get along with other people? Not difficult at all Extremely difficult     Extremely difficult   PHQ-9 Score 4 11     14   PHQ-9 Interpretation Minimal or None Moderate      Moderate   ERIK-7 Score: 7  Interpretation: Mild Anxiety   Denies SI/HI  Continue lexapro as prescribed.  Read positive daily meditations, avoid negative media, set healthy boundaries.   Exercise daily, keep consistent sleep pattern, eat a healthy diet.   Establish good social support, make changes to reduce stress.   Do not drink alcohol or use illicit drugs.   Reports any symptoms of suicidal/homicidal ideations or self harm immediately, go to nearest emergency room.               Cardiac/Vascular    Primary hypertension    Current Assessment & Plan     BP Readings from Last 3 Encounters:   04/11/24 (!) 154/78   03/14/24 (!) 158/98   03/14/24 (!) 158/90      Not at goal.  Follow a low sodium (less than 2 grams of sodium per day), DASH diet.   Continue losartan and carvedilol as prescribed.  Bring BP log to cardiology clinic.  Monitor blood pressure and report any consistent values greater than 140/90 and keep a log.  Encouraged smoking cessation to aid in BP reduction and co-morbidities.   Maintain healthy weight with a BMI goal of <30.   Aerobic exercise for 150 minutes per week (or 5 days a week for 30 minutes each day).             Endocrine    BMI 26.0-26.9,adult    Current Assessment & Plan     Body mass index is 26.14 kg/m². (At goal).                  Health Maintenance Due   Topic Date Due    COVID-19 Vaccine (1) Never done     Tests to Keep You Healthy    Last Blood Pressure <= 139/89 (4/11/2024): NO  Tobacco Cessation: NO      Health Maintenance Topics with due status: Not Due       Topic Last Completion Date    Hemoglobin A1c (Diabetic Prevention Screening) 05/22/2023    Lipid Panel 08/03/2023    High Dose Statin 03/14/2024       Future Appointments   Date Time Provider Department Center   4/18/2024  3:00 PM Larry Martinez MD [facility] [place]        Follow up in about 4 months (around 8/11/2024) for F2F, Follow up, Med check, Lab review, RTC PRN.          Signature:        Alfreda Jones,  FNP  [facility]  [address] [facility] [address] [phone]    Date of encounter: 4/11/24

## 2024-04-11 NOTE — PATIENT INSTRUCTIONS
REMINDER: Please complete labs within 1 week of appointment.   Please complete satisfaction survey when received. Thank you.    Jayme Aguilar,     If you are due for any health screening(s) below please notify me so we can arrange them to be ordered and scheduled. Most healthy patients at your age complete them, but you are free to accept or refuse.     If you can't do it, I'll definitely understand. If you can, I'd certainly appreciate it!    Tests to Keep You Healthy    Last Blood Pressure <= 139/89 (4/11/2024): NO  Tobacco Cessation: NO      Lets manage your high blood pressure     Your blood pressure was above 140/90 today during your visit. We recommend that you schedule a nurse visit in two weeks to check your blood pressure and discuss ways to support your health goals.     You can also manage your health and record your blood pressure from the comfort of home by keeping a daily blood pressure log. These results are shared with and reviewed by your provider. Please print this form (Daily Blood Pressure Log) to assist you in keeping track of your blood pressure at home.     Schedule your nurse visit in two weeks to learn more about how to track and manage high blood pressure.    Daily Blood Pressure Log    Name:__________________________________                  Date of Birth:_________    Average Blood Pressure:  __________      Date: Time  (a.m.) Blood  Pressure: Pulse  Rate: Time  (p.m.) Blood  Pressure : Pulse  Rate:   Sample 8:37 127/83 84                                                                                                                                                                                   Were here to help you quit smoking     Our records indicated that you are still smoking. One of the best things you can do for your health is to stop smoking and we are here to help.     Talk with your provider about our Smoking Cessation Program and how we can support you on your journey.

## 2024-04-15 DIAGNOSIS — R07.9 CHEST PAIN, UNSPECIFIED TYPE: Primary | ICD-10-CM

## 2024-04-18 ENCOUNTER — OFFICE VISIT (OUTPATIENT)
Dept: CARDIOLOGY | Facility: CLINIC | Age: 44
End: 2024-04-18
Payer: MEDICAID

## 2024-04-18 VITALS
RESPIRATION RATE: 20 BRPM | TEMPERATURE: 99 F | HEIGHT: 71 IN | HEART RATE: 70 BPM | SYSTOLIC BLOOD PRESSURE: 126 MMHG | BODY MASS INDEX: 25.99 KG/M2 | DIASTOLIC BLOOD PRESSURE: 83 MMHG | WEIGHT: 185.63 LBS | OXYGEN SATURATION: 97 %

## 2024-04-18 DIAGNOSIS — E78.2 MIXED HYPERLIPIDEMIA: ICD-10-CM

## 2024-04-18 DIAGNOSIS — I10 PRIMARY HYPERTENSION: Primary | ICD-10-CM

## 2024-04-18 DIAGNOSIS — I50.9 CONGESTIVE HEART FAILURE, UNSPECIFIED HF CHRONICITY, UNSPECIFIED HEART FAILURE TYPE: ICD-10-CM

## 2024-04-18 PROCEDURE — 99214 OFFICE O/P EST MOD 30 MIN: CPT | Mod: PBBFAC | Performed by: INTERNAL MEDICINE

## 2024-04-18 RX ORDER — CARVEDILOL 12.5 MG/1
12.5 TABLET ORAL 2 TIMES DAILY WITH MEALS
Qty: 180 TABLET | Refills: 3 | Status: SHIPPED | OUTPATIENT
Start: 2024-04-18 | End: 2024-05-29

## 2024-04-18 NOTE — PROGRESS NOTES
CHIEF COMPLAINT:   Chief Complaint   Patient presents with    f/u had chest pain 2 weeks ago no sob no questions                                                   HPI:  Jeff Sanchez Jr. 43 y.o. male past medical history of HFrEF (EF 40%), GERD, DM 2, HLD, anxiety, depression presents to Cardiology Clinic today for follow up.  At last visit patient was started on Coreg 3.125 mg b.i.d. and Entresto  mg b.i.d..  Patient states that since then he has been feeling better.  Denies any chest pain, shortness for breath, lower extremity edema.  Checks his pressure regularly, presents with blood pressure log.  Morning blood pressure with significant variation, systolic ranging between 140s to 190s.  P.m. blood pressure well-controlled ranging 120s-130s.  Patient works as a  and checks his morning pressure while at work.                                                                                                                                                                                                                                                                                        CARDIAC TESTING:  No results found for this or any previous visit.    No results found for this or any previous visit.     No results found for this or any previous visit.       Patient Active Problem List   Diagnosis    Congestive heart failure    Vapes non-nicotine containing substance    Left ventricular thrombus    Gastroesophageal reflux disease without esophagitis    Abdominal cramping    BMI 26.0-26.9,adult    Mixed hyperlipidemia    CHF exacerbation    Cardiomyopathy secondary to drug    Elevated troponin    Anxiety and depression    Positive depression screening    Primary hypertension    Loss of appetite    Internal hemorrhoid    Bradycardia with 41-50 beats per minute    Chronic midline low back pain without sciatica    Chest pain in adult     Past Surgical History:   Procedure Laterality Date    SPINE SURGERY   lower fusion     Social History     Socioeconomic History    Marital status: Single   Tobacco Use    Smoking status: Every Day     Types: Cigars    Smokeless tobacco: Former    Tobacco comments:     Smokes 3 cigars a day   Substance and Sexual Activity    Alcohol use: Not Currently    Drug use: Never    Sexual activity: Yes     Partners: Female     Social Determinants of Health     Financial Resource Strain: Medium Risk (8/3/2023)    Overall Financial Resource Strain (CARDIA)     Difficulty of Paying Living Expenses: Somewhat hard   Food Insecurity: No Food Insecurity (8/3/2023)    Hunger Vital Sign     Worried About Running Out of Food in the Last Year: Never true     Ran Out of Food in the Last Year: Never true   Transportation Needs: No Transportation Needs (8/3/2023)    PRAPARE - Transportation     Lack of Transportation (Medical): No     Lack of Transportation (Non-Medical): No   Physical Activity: Inactive (8/3/2023)    Exercise Vital Sign     Days of Exercise per Week: 0 days     Minutes of Exercise per Session: 0 min   Housing Stability: Unknown (8/3/2023)    Housing Stability Vital Sign     Unable to Pay for Housing in the Last Year: No     Unstable Housing in the Last Year: No        Family History   Problem Relation Name Age of Onset    Hypertension Mother rich     Diabetes Mother rich     Stroke Mother rich     Hyperlipidemia Mother rich     Lung cancer Father      Schizophrenia Father      Hypertension Sister bubba     Stroke Sister bubba     Heart attack Maternal Grandfather      Heart attack Paternal Grandfather       Review of patient's allergies indicates:  No Known Allergies      ROS:  Review of Systems   HENT: Negative.     Eyes: Negative.    Respiratory:  Negative for shortness of breath.    Cardiovascular: Negative.  Negative for chest pain, palpitations, orthopnea, claudication, leg swelling and PND.   Gastrointestinal: Negative.    Genitourinary: Negative.    Musculoskeletal: Negative.    Skin:  "Negative.    Neurological: Negative.  Negative for dizziness and weakness.   Endo/Heme/Allergies: Negative.        PHYSICAL EXAM:  Visit Vitals  /83 (BP Location: Left arm, Patient Position: Sitting, BP Method: Medium (Automatic))   Pulse 70   Temp 98.6 °F (37 °C) (Oral)   Resp 20   Ht 5' 11" (1.803 m)   Wt 84.2 kg (185 lb 10 oz)   SpO2 97%   BMI 25.89 kg/m²       Physical Exam  Constitutional:       Appearance: Normal appearance.   HENT:      Head: Normocephalic.      Mouth/Throat:      Mouth: Mucous membranes are moist.   Eyes:      Extraocular Movements: Extraocular movements intact.   Neck:      Vascular: No carotid bruit.   Cardiovascular:      Rate and Rhythm: Normal rate and regular rhythm.      Pulses: Normal pulses.      Heart sounds: Murmur heard.   Pulmonary:      Effort: Pulmonary effort is normal.   Abdominal:      General: There is no distension.   Musculoskeletal:         General: Normal range of motion.      Right lower leg: No edema.      Left lower leg: No edema.   Skin:     General: Skin is warm and dry.   Neurological:      General: No focal deficit present.      Mental Status: He is alert and oriented to person, place, and time.   Psychiatric:         Mood and Affect: Mood normal.         Behavior: Behavior normal.         Current Outpatient Medications   Medication Instructions    aspirin (ECOTRIN) 81 mg, Oral, Daily    atorvastatin (LIPITOR) 40 mg, Oral, Nightly    carvediloL (COREG) 6.25 mg, Oral, 2 times daily    EScitalopram oxalate (LEXAPRO) 10 mg, Oral, Daily    furosemide (LASIX) 40 mg, Daily    methocarbamoL (ROBAXIN) 500 mg, Oral, 3 times daily PRN    omeprazole (PRILOSEC) 40 mg, Oral, Daily    sacubitriL-valsartan (ENTRESTO)  mg per tablet 1 tablet, Oral, 2 times daily        All medications, laboratory studies, cardiac diagnostic imaging reviewed.     Lab Results   Component Value Date    LDL 51.00 08/03/2023    .00 05/22/2023    TRIG 235 (H) 08/03/2023    TRIG 277 " (H) 05/22/2023    CREATININE 1.25 (H) 03/14/2024    MG 1.90 03/14/2024    K 4.4 03/14/2024        ASSESSMENT/PLAN:    Essential hypertension  - BP today 126/83.  - patient only currently on losartan 100 mg   - Morning blood pressure with significant variation, systolic ranging between 140s to 190s.    -PM blood pressure well-controlled ranging 120s-130s.  Instructed patient to check pressures on the weekend whenever he was off of work.  We will assess to see if elevated morning readings are in relation to work.  -continue Entresto  mg b.i.d, increase Coreg to 12.5 mg b.i.d..  - Counseled on low-sodium, heart healthy diet and exercise as tolerated  -BP log provided to patient.  Blood pressure check in 1 month.    Congestive heart failure, unspecified HF chronicity, unspecified heart failure type  Cardiomyopathy likely secondary to drug use  -most recent echocardiogram 01/24/2024: EF 40%, severe concentric hypertrophy.  - Patient states that prior to initial diagnosis of heart failure, he was using methamphetamines, denies use since being diagnosed with heart failure.  - States that he is not taking Lasix anymore due to feeling dehydrated and frequent urination, advised patient to take Lasix PRN for weight gain/swelling  - Counseled on low-sodium (<2g of salt per day), heart healthy diet and exercise as tolerated  -continue Entresto  mg b.i.d, increase Coreg to 12.5 mg b.i.d..    Hyperlipidemia  - LDL 51 per labs 8.3.23;  (decreased from prior)  - Continue Atorvastatin 80 MG daily, may need to add Fenofibrate in future if TGL to not decrease with statin and diet/exercise  - Counseled on low-cholesterol, heart healthy diet and exercise as tolerated   - Management per PCP    History of Substance Abuse  - Prior history of methamphetamine use, states that he has not used any illicit drugs since he was diagnosed with HF/cardiomyopathy  - Counseled on importance of abstaining from illicit drugs    Tobacco  Abuse  - Reports vaping and smoking cigars  - Counseled on cessation, states that he is trying to quit on his own      Blood pressure check in 1 month.    RTC in 6 months.      Mj Ortiz MD  [facility] Internal Medicine PGY 1

## 2024-04-18 NOTE — PROGRESS NOTES
Cardiology Attending    I evaluated Jeff Sanchez Jr. and discussed the patient's symptoms, findings, and management plan with the resident.  Please see the Cardiology note for details.

## 2024-05-29 ENCOUNTER — CLINICAL SUPPORT (OUTPATIENT)
Dept: CARDIOLOGY | Facility: CLINIC | Age: 44
End: 2024-05-29
Payer: MEDICAID

## 2024-05-29 VITALS
SYSTOLIC BLOOD PRESSURE: 128 MMHG | BODY MASS INDEX: 25.76 KG/M2 | HEIGHT: 71 IN | RESPIRATION RATE: 20 BRPM | HEART RATE: 54 BPM | TEMPERATURE: 98 F | OXYGEN SATURATION: 98 % | DIASTOLIC BLOOD PRESSURE: 72 MMHG | WEIGHT: 184 LBS

## 2024-05-29 DIAGNOSIS — I10 PRIMARY HYPERTENSION: Primary | ICD-10-CM

## 2024-05-29 PROCEDURE — 99213 OFFICE O/P EST LOW 20 MIN: CPT | Mod: PBBFAC

## 2024-05-29 RX ORDER — CARVEDILOL 6.25 MG/1
12.5 TABLET ORAL 2 TIMES DAILY WITH MEALS
COMMUNITY

## 2024-05-29 RX ORDER — CARVEDILOL 3.12 MG/1
TABLET ORAL
COMMUNITY
Start: 2024-05-18

## 2024-05-29 NOTE — PROGRESS NOTES
Patient saw for nurse visit, BP and HR check. He report feeling much better and did complete his home BP log, scanned into  for Dr Martinez's review. BP in clinic 128/72 hr 54. Pt advised to continue current medicine, adhere to low na diet, and ed precautions given. Understanding verbalized.

## 2024-06-30 DIAGNOSIS — F32.A ANXIETY AND DEPRESSION: ICD-10-CM

## 2024-06-30 DIAGNOSIS — F41.9 ANXIETY AND DEPRESSION: ICD-10-CM

## 2024-07-01 RX ORDER — ESCITALOPRAM OXALATE 10 MG/1
10 TABLET ORAL DAILY
Qty: 90 TABLET | Refills: 2 | Status: SHIPPED | OUTPATIENT
Start: 2024-07-01 | End: 2025-07-01

## 2024-08-12 ENCOUNTER — LAB VISIT (OUTPATIENT)
Dept: LAB | Facility: HOSPITAL | Age: 44
End: 2024-08-12
Payer: MEDICAID

## 2024-08-12 ENCOUNTER — OFFICE VISIT (OUTPATIENT)
Dept: INTERNAL MEDICINE | Facility: CLINIC | Age: 44
End: 2024-08-12
Payer: MEDICAID

## 2024-08-12 VITALS
RESPIRATION RATE: 16 BRPM | SYSTOLIC BLOOD PRESSURE: 135 MMHG | HEART RATE: 65 BPM | HEIGHT: 71 IN | BODY MASS INDEX: 26.41 KG/M2 | OXYGEN SATURATION: 100 % | DIASTOLIC BLOOD PRESSURE: 88 MMHG | WEIGHT: 188.63 LBS

## 2024-08-12 DIAGNOSIS — Z72.89 VAPES NON-NICOTINE CONTAINING SUBSTANCE: ICD-10-CM

## 2024-08-12 DIAGNOSIS — Z00.00 WELL ADULT EXAM: Primary | ICD-10-CM

## 2024-08-12 DIAGNOSIS — I50.9 CHRONIC CONGESTIVE HEART FAILURE, UNSPECIFIED HEART FAILURE TYPE: ICD-10-CM

## 2024-08-12 DIAGNOSIS — E78.2 MIXED HYPERLIPIDEMIA: ICD-10-CM

## 2024-08-12 DIAGNOSIS — Z12.5 SCREENING FOR PROSTATE CANCER: ICD-10-CM

## 2024-08-12 DIAGNOSIS — K62.5 BRBPR (BRIGHT RED BLOOD PER RECTUM): ICD-10-CM

## 2024-08-12 DIAGNOSIS — I50.9 CONGESTIVE HEART FAILURE, UNSPECIFIED HF CHRONICITY, UNSPECIFIED HEART FAILURE TYPE: ICD-10-CM

## 2024-08-12 DIAGNOSIS — I10 PRIMARY HYPERTENSION: ICD-10-CM

## 2024-08-12 DIAGNOSIS — Z13.0 SCREENING FOR IRON DEFICIENCY ANEMIA: ICD-10-CM

## 2024-08-12 DIAGNOSIS — Z13.29 SCREENING FOR THYROID DISORDER: ICD-10-CM

## 2024-08-12 DIAGNOSIS — L81.9 CHANGE IN COLOR OF PIGMENTED SKIN LESION: ICD-10-CM

## 2024-08-12 DIAGNOSIS — Z13.21 ENCOUNTER FOR VITAMIN DEFICIENCY SCREENING: ICD-10-CM

## 2024-08-12 PROBLEM — R00.1 BRADYCARDIA WITH 41-50 BEATS PER MINUTE: Status: RESOLVED | Noted: 2023-07-06 | Resolved: 2024-08-12

## 2024-08-12 LAB
25(OH)D3+25(OH)D2 SERPL-MCNC: 40 NG/ML (ref 30–80)
ALBUMIN SERPL-MCNC: 3.8 G/DL (ref 3.5–5)
ALBUMIN/GLOB SERPL: 1.2 RATIO (ref 1.1–2)
ALP SERPL-CCNC: 83 UNIT/L (ref 40–150)
ALT SERPL-CCNC: 19 UNIT/L (ref 0–55)
ANION GAP SERPL CALC-SCNC: 6 MEQ/L
AST SERPL-CCNC: 20 UNIT/L (ref 5–34)
BACTERIA #/AREA URNS AUTO: ABNORMAL /HPF
BASOPHILS # BLD AUTO: 0.05 X10(3)/MCL
BASOPHILS NFR BLD AUTO: 0.5 %
BILIRUB SERPL-MCNC: 0.3 MG/DL
BILIRUB UR QL STRIP.AUTO: NEGATIVE
BUN SERPL-MCNC: 16.2 MG/DL (ref 8.9–20.6)
CALCIUM SERPL-MCNC: 10.2 MG/DL (ref 8.4–10.2)
CHLORIDE SERPL-SCNC: 105 MMOL/L (ref 98–107)
CHOLEST SERPL-MCNC: 143 MG/DL
CHOLEST/HDLC SERPL: 5 {RATIO} (ref 0–5)
CLARITY UR: CLEAR
CO2 SERPL-SCNC: 29 MMOL/L (ref 22–29)
COLOR UR AUTO: ABNORMAL
CREAT SERPL-MCNC: 1.04 MG/DL (ref 0.73–1.18)
CREAT UR-MCNC: 109.2 MG/DL (ref 63–166)
CREAT/UREA NIT SERPL: 16
EOSINOPHIL # BLD AUTO: 0.2 X10(3)/MCL (ref 0–0.9)
EOSINOPHIL NFR BLD AUTO: 2 %
ERYTHROCYTE [DISTWIDTH] IN BLOOD BY AUTOMATED COUNT: 13.8 % (ref 11.5–17)
EST. AVERAGE GLUCOSE BLD GHB EST-MCNC: 111.2 MG/DL
FERRITIN SERPL-MCNC: 176.02 NG/ML (ref 21.81–274.66)
GFR SERPLBLD CREATININE-BSD FMLA CKD-EPI: >60 ML/MIN/1.73/M2
GLOBULIN SER-MCNC: 3.3 GM/DL (ref 2.4–3.5)
GLUCOSE SERPL-MCNC: 92 MG/DL (ref 74–100)
GLUCOSE UR QL STRIP: NORMAL
HBA1C MFR BLD: 5.5 %
HCT VFR BLD AUTO: 44.2 % (ref 42–52)
HDLC SERPL-MCNC: 29 MG/DL (ref 35–60)
HGB BLD-MCNC: 14.8 G/DL (ref 14–18)
HGB UR QL STRIP: NEGATIVE
HYALINE CASTS #/AREA URNS LPF: ABNORMAL /LPF
IMM GRANULOCYTES # BLD AUTO: 0.03 X10(3)/MCL (ref 0–0.04)
IMM GRANULOCYTES NFR BLD AUTO: 0.3 %
IRON SATN MFR SERPL: 18 % (ref 20–50)
IRON SERPL-MCNC: 42 UG/DL (ref 65–175)
KETONES UR QL STRIP: NEGATIVE
LDLC SERPL CALC-MCNC: 61 MG/DL (ref 50–140)
LEUKOCYTE ESTERASE UR QL STRIP: NEGATIVE
LYMPHOCYTES # BLD AUTO: 1.8 X10(3)/MCL (ref 0.6–4.6)
LYMPHOCYTES NFR BLD AUTO: 18 %
MCH RBC QN AUTO: 30.8 PG (ref 27–31)
MCHC RBC AUTO-ENTMCNC: 33.5 G/DL (ref 33–36)
MCV RBC AUTO: 91.9 FL (ref 80–94)
MICROALBUMIN UR-MCNC: 14.3 UG/ML
MICROALBUMIN/CREAT RATIO PNL UR: 13.1 MG/GM CR (ref 0–30)
MONOCYTES # BLD AUTO: 1.09 X10(3)/MCL (ref 0.1–1.3)
MONOCYTES NFR BLD AUTO: 10.9 %
MUCOUS THREADS URNS QL MICRO: ABNORMAL /LPF
NEUTROPHILS # BLD AUTO: 6.81 X10(3)/MCL (ref 2.1–9.2)
NEUTROPHILS NFR BLD AUTO: 68.3 %
NITRITE UR QL STRIP: NEGATIVE
NRBC BLD AUTO-RTO: 0 %
PH UR STRIP: 6.5 [PH]
PLATELET # BLD AUTO: 169 X10(3)/MCL (ref 130–400)
PMV BLD AUTO: 10.2 FL (ref 7.4–10.4)
POTASSIUM SERPL-SCNC: 4.3 MMOL/L (ref 3.5–5.1)
PROT SERPL-MCNC: 7.1 GM/DL (ref 6.4–8.3)
PROT UR QL STRIP: NEGATIVE
PSA SERPL-MCNC: 0.7 NG/ML
RBC # BLD AUTO: 4.81 X10(6)/MCL (ref 4.7–6.1)
RBC #/AREA URNS AUTO: ABNORMAL /HPF
SODIUM SERPL-SCNC: 140 MMOL/L (ref 136–145)
SP GR UR STRIP.AUTO: 1.02 (ref 1–1.03)
SQUAMOUS #/AREA URNS LPF: ABNORMAL /HPF
T4 FREE SERPL-MCNC: 0.93 NG/DL (ref 0.7–1.48)
TIBC SERPL-MCNC: 197 UG/DL (ref 69–240)
TIBC SERPL-MCNC: 239 UG/DL (ref 250–450)
TRANSFERRIN SERPL-MCNC: 219 MG/DL (ref 174–364)
TRIGL SERPL-MCNC: 263 MG/DL (ref 34–140)
TSH SERPL-ACNC: 1.18 UIU/ML (ref 0.35–4.94)
UROBILINOGEN UR STRIP-ACNC: NORMAL
VLDLC SERPL CALC-MCNC: 53 MG/DL
WBC # BLD AUTO: 9.98 X10(3)/MCL (ref 4.5–11.5)
WBC #/AREA URNS AUTO: ABNORMAL /HPF

## 2024-08-12 PROCEDURE — 82043 UR ALBUMIN QUANTITATIVE: CPT

## 2024-08-12 PROCEDURE — 83036 HEMOGLOBIN GLYCOSYLATED A1C: CPT

## 2024-08-12 PROCEDURE — 83540 ASSAY OF IRON: CPT

## 2024-08-12 PROCEDURE — 83550 IRON BINDING TEST: CPT

## 2024-08-12 PROCEDURE — 80053 COMPREHEN METABOLIC PANEL: CPT

## 2024-08-12 PROCEDURE — 81001 URINALYSIS AUTO W/SCOPE: CPT

## 2024-08-12 PROCEDURE — 99215 OFFICE O/P EST HI 40 MIN: CPT | Mod: PBBFAC

## 2024-08-12 PROCEDURE — 80061 LIPID PANEL: CPT

## 2024-08-12 PROCEDURE — 82570 ASSAY OF URINE CREATININE: CPT

## 2024-08-12 PROCEDURE — 84153 ASSAY OF PSA TOTAL: CPT

## 2024-08-12 PROCEDURE — 85025 COMPLETE CBC W/AUTO DIFF WBC: CPT

## 2024-08-12 PROCEDURE — 84439 ASSAY OF FREE THYROXINE: CPT

## 2024-08-12 PROCEDURE — 82728 ASSAY OF FERRITIN: CPT

## 2024-08-12 PROCEDURE — 36415 COLL VENOUS BLD VENIPUNCTURE: CPT

## 2024-08-12 PROCEDURE — 84443 ASSAY THYROID STIM HORMONE: CPT

## 2024-08-12 PROCEDURE — 82306 VITAMIN D 25 HYDROXY: CPT

## 2024-08-12 NOTE — ASSESSMENT & PLAN NOTE
BP Readings from Last 3 Encounters:   08/12/24 135/88   05/29/24 128/72   04/18/24 126/83      At goal.  Follow a low sodium (less than 2 grams of sodium per day), DASH diet.   Continue losartan and carvedilol as prescribed.  Bring BP log to cardiology clinic.  Monitor blood pressure and report any consistent values greater than 140/90 and keep a log.  Encouraged smoking cessation to aid in BP reduction and co-morbidities.   Maintain healthy weight with a BMI goal of <30.   Aerobic exercise for 150 minutes per week (or 5 days a week for 30 minutes each day).

## 2024-08-12 NOTE — PROGRESS NOTES
"    PATIENT NAME: Jeff Sanchez Jr.  : 1980  DATE: 24  MRN: [id number]          Reason for Visit/Chief Complaint   Annual Exam, Hematochezia, and skin changes       History of Present Illness (HPI)     Jeff Sanchez Jr. is a 43 y.o. White male presenting in clinic today for an Annual Exam, Hematochezia, and skin changes. PMH: CHF (EF less than 20% on 5/15/2022, 40% on 2024), left ventricular thrombus (no longer on warfarin), and lumbar radiculopathy with laminectomy, decompression, fusion L4-5-S1. Of note, he is no longer taking warfarin, and is taking a low dose ASA. Family on mother and father's side has a strong cardiac history. Patient's younger sister has suffered a stroke previously. He is currently followed by [facility] cardiology clinic - last office visit 2024.      BP: 135/88 - at goal. Asymptomatic. Does not regularly check BP at home.      Patient states he had an overnight admission to [facility] [place] for dehydration. States he was cutting grass and playing with the kids when he became weak. States his eyes were "sunken" in. States he was given multiple bags of IV fluids.    Patient reports noticing BRBPR when he wipes after a BM. Denies constipation or hemorrhoids. Denies family h/o colon cancer.    Patient reports "darkening" spots on left arm. Denies family h/o skin cancer, but states his mother's skin is changing as well.     Patient states he vapes. Denies alcohol or illicit drug use. Denies chest pain, shortness of breath, cough, headache, dizziness, weakness, abdominal pain, nausea, vomiting, diarrhea, constipation, dysuria, SI, and HI.     Prostate Cancer Screening - 2024 - PSA: 0.70. Follow up annually.   Colon Cancer Screening - Deferred due to age. Will initiate testing at age 45.  Eye Exam -Several years. List of local eye doctors given to patient.   Dental Exam - Several years. List of local dentists given to patient.   Vaccinations: Flu - Not currently being offered, recommended " annually. / Pneumococcal - Declines / Tetanus - Declines        Review of Systems     Review of Systems   HENT: Negative.     Eyes: Negative.    Respiratory: Negative.  Negative for shortness of breath.    Cardiovascular: Negative.    Gastrointestinal: Negative.    Endocrine: Negative.    Genitourinary: Negative.    Musculoskeletal: Negative.    Skin:  Positive for color change.   Allergic/Immunologic: Negative.    Neurological: Negative.  Negative for dizziness.   Hematological: Negative.    Psychiatric/Behavioral: Negative.     All other systems reviewed and are negative.      Medical / Social / Family History     Past Medical History:   Diagnosis Date    Cerebrovascular disease     CHF (congestive heart failure)     HLD (hyperlipidemia)     Hypertension     Lumbar herniated disc     Thrombus     LEFT VENTRICULAR         Past Surgical History:   Procedure Laterality Date    SPINE SURGERY  lower fusion     Social History  Jeff Sanchez Jr.'s  reports that he has been smoking cigars. He has quit using smokeless tobacco. He reports that he does not currently use alcohol. He reports current drug use. Drug: Marijuana.    Family History  Jeff Sanchez Jr.'s family history includes Diabetes in his mother; Heart attack in his maternal grandfather and paternal grandfather; Hyperlipidemia in his mother; Hypertension in his mother and sister; Lung cancer in his father; Schizophrenia in his father; Stroke in his mother and sister.    Medications and Allergies     Medications  Current Outpatient Medications   Medication Instructions    aspirin (ECOTRIN) 81 mg, Oral, Daily    atorvastatin (LIPITOR) 40 mg, Oral, Nightly    carvediloL (COREG) 12.5 mg, Oral, 2 times daily with meals    EScitalopram oxalate (LEXAPRO) 10 mg, Oral, Daily    sacubitriL-valsartan (ENTRESTO)  mg per tablet 1 tablet, Oral, 2 times daily       Allergies  Review of patient's allergies indicates:  No Known Allergies    Physical Examination   Visit  "Vitals  /88 (BP Location: Left arm, Patient Position: Sitting, BP Method: Medium (Automatic))   Pulse 65   Resp 16   Ht 5' 11" (1.803 m)   Wt 85.5 kg (188 lb 9.6 oz)   SpO2 100%   BMI 26.30 kg/m²       Physical Exam  Vitals reviewed.   Constitutional:       Appearance: Normal appearance. He is normal weight.   HENT:      Head: Normocephalic.      Nose: Nose normal.      Mouth/Throat:      Mouth: Mucous membranes are moist.      Pharynx: Oropharynx is clear.   Eyes:      Extraocular Movements: Extraocular movements intact.      Conjunctiva/sclera: Conjunctivae normal.      Pupils: Pupils are equal, round, and reactive to light.   Cardiovascular:      Rate and Rhythm: Normal rate and regular rhythm.      Heart sounds: Normal heart sounds.   Pulmonary:      Effort: Pulmonary effort is normal.      Breath sounds: Normal breath sounds.   Abdominal:      General: Abdomen is flat. Bowel sounds are normal.      Palpations: Abdomen is soft.   Musculoskeletal:         General: Normal range of motion.      Cervical back: Normal range of motion.      Right lower leg: No edema.      Left lower leg: No edema.   Skin:     General: Skin is warm and dry.      Capillary Refill: Capillary refill takes less than 2 seconds.   Neurological:      General: No focal deficit present.      Mental Status: He is alert and oriented to person, place, and time.   Psychiatric:         Mood and Affect: Mood normal.           Results     Lab Results   Component Value Date    WBC 9.98 08/12/2024    RBC 4.81 08/12/2024    HGB 14.8 08/12/2024    HCT 44.2 08/12/2024    MCV 91.9 08/12/2024    MCH 30.8 08/12/2024    MCHC 33.5 08/12/2024    RDW 13.8 08/12/2024     08/12/2024    MPV 10.2 08/12/2024      Lab Results   Component Value Date     08/12/2024    K 4.3 08/12/2024    CO2 29 08/12/2024    GLUCOSE 92 08/12/2024    BUN 16.2 08/12/2024    CREATININE 1.04 08/12/2024    LABPROT 7.1 08/12/2024    ALBUMIN 3.8 08/12/2024    BILITOT 0.3 " 08/12/2024    ALKPHOS 83 08/12/2024    AST 20 08/12/2024    ALT 19 08/12/2024    AGAP 6.0 08/12/2024    EGFRNORACEVR >60 08/12/2024     Lab Results   Component Value Date    TSH 1.175 08/12/2024    T4FREE 1.28 03/30/2022     Lab Results   Component Value Date    CHOL 143 08/12/2024    HDL 29 (L) 08/12/2024    LDL 61.00 08/12/2024    TRIG 263 (H) 08/12/2024     Lab Results   Component Value Date    SGUA 1.018 08/12/2024    PROTEINUA Negative 08/12/2024    BILIRUBINUA Negative 08/12/2024    WBCUA 0-5 08/12/2024    RBCUA 0-5 08/12/2024    BACTERIA None Seen 08/12/2024    LEUKOCYTESUR Negative 08/12/2024    UROBILINOGEN Normal 08/12/2024     Lab Results   Component Value Date    CREATRANDUR 109.2 08/12/2024    MICALBCREAT 13.1 08/12/2024     Lab Results   Component Value Date    GUWJNXAA63NU 40 08/12/2024     Lab Results   Component Value Date    HIV Nonreactive 05/22/2023    HEPAIGM Nonreactive 05/22/2023    HEPBSAG Nonreactive 05/22/2023    HEPCAB Nonreactive 05/22/2023     Assessment and Plan (including Health Maintenance)     Problem List Items Addressed This Visit          Derm    Change in color of pigmented skin lesion    Current Assessment & Plan         Referral placed to [facility] clinic to eval and treat.           Relevant Orders    Ambulatory referral/consult to Family Practice       Cardiac/Vascular    Congestive heart failure    Overview     1/24/2024-ECHO      Left Ventricle: The left ventricle is moderately dilated. Moderately increased wall thickness. There is severe concentric hypertrophy. Moderate global hypokinesis present. There is moderately reduced systolic function. Biplane (2D) method of discs ejection fraction is 40%. There is normal diastolic function.    Right Ventricle: Normal right ventricular cavity size. Systolic function is normal.    Left Atrium: Left atrium is mildly dilated.    Right Atrium: Normal right atrial size.    Aortic Valve: The aortic valve is a trileaflet valve.  There is no stenosis. There is no significant regurgitation.    Mitral Valve: The mitral valve is structurally normal. There is no stenosis.There is trace regurgitation.    Tricuspid Valve: The tricuspid valve is structurally normal. There is trace regurgitation.    Pulmonic Valve: There is trace regurgitation.    Aorta: Aortic root is normal in size measuring 3.6 cm. Ascending aorta is mildly dilated measuring 3.6 cm.    Pulmonary Artery: No pulmonary hypertension. The estimated pulmonary artery systolic pressure is 17 mmHg.    IVC/SVC: Normal venous pressure at 3 mmHg.    Pericardium: There is no pericardial effusion.         Current Assessment & Plan     Managed by [facility] Cardiology clinic.  Continue atorvastatin, carvedilol, and losartan.   Patient states he is no longer taking furosemide.  Last ECHO (1/24/2024) showed EF of less than 40%.  Notify the clinic if you gain 3 or more pounds in one day or 5 or more pounds in one week.  Stressed importance of daily morning weights after urination but prior to breakfast on the same scale.  Low Sodium Diet (Dash Diet - less than 2 grams of sodium per day).  Follow a low cholesterol, low saturated fat diet with less that 200mg of cholesterol a day.  Cut down on alcohol if you have more than 1 drink a day (for women) or 2 drinks a day (for men).  Maintain healthy weight with goal BMI <30. Perform 30 minutes of daily physical activity 5 days per week.  Report to ER for chest pain, SOB, difficulty breathing, abdominal distention or significant edema to lower extremities.            Mixed hyperlipidemia    Current Assessment & Plan     Lab Results   Component Value Date    LDL 51.00 08/03/2023       Lab Results   Component Value Date    TRIG 235 (H) 08/03/2023       Lab Results   Component Value Date    HDL 24 (L) 08/03/2023        Lab Results   Component Value Date    CHOL 122 08/03/2023      Continue atorvastatin as prescribed.  Follow a low cholesterol, low saturated fat  diet with less than 200 mg of cholesterol a day.   Avoid fried foods and high saturated fats.  Add flax seed or fish oil supplements to diet.   Increase dietary fiber.   Regular exercise improves cholesterol levels.  Physical activity 5 times a week for 30 minutes per day (or 150 minutes per week).   Stressed importance of dietary modifications.          Relevant Orders    Lipid Panel    Primary hypertension    Current Assessment & Plan     BP Readings from Last 3 Encounters:   08/12/24 135/88   05/29/24 128/72   04/18/24 126/83      At goal.  Follow a low sodium (less than 2 grams of sodium per day), DASH diet.   Continue losartan and carvedilol as prescribed.  Bring BP log to cardiology clinic.  Monitor blood pressure and report any consistent values greater than 140/90 and keep a log.  Encouraged smoking cessation to aid in BP reduction and co-morbidities.   Maintain healthy weight with a BMI goal of <30.   Aerobic exercise for 150 minutes per week (or 5 days a week for 30 minutes each day).          Relevant Orders    CBC Auto Differential    Comprehensive Metabolic Panel    Microalbumin/Creatinine Ratio, Urine    Urine culture       Endocrine    BMI 26.0-26.9,adult    Current Assessment & Plan     Body mass index is 26.3 kg/m². (At goal).             GI    BRBPR (bright red blood per rectum)    Current Assessment & Plan     Referral to Dr. Mario English to eval and treat.         Relevant Orders    Ambulatory referral/consult to Gastroenterology       Other    Vapes non-nicotine containing substance    Current Assessment & Plan     Vaping cessation discussed.         Well adult exam - Primary    Current Assessment & Plan     Wellness labs - 8/12/2024.   Prostate Cancer Screening - 8/12/2024 - PSA: 0.70. Follow up annually.   Colon Cancer Screening - Deferred due to age. Will initiate testing at age 45.  Eye Exam -Several years. List of local eye doctors given to patient.   Dental Exam - Several years. List of  local dentists given to patient.   Vaccinations: Flu - Not currently being offered, recommended annually. / Pneumococcal - Declines / Tetanus - Declines                    Health Maintenance Due   Topic Date Due    TETANUS VACCINE  Never done    COVID-19 Vaccine (1 - 2023-24 season) Never done     Tests to Keep You Healthy    Last Blood Pressure <= 139/89 (8/12/2024): Yes  Tobacco Cessation: NO      Health Maintenance Topics with due status: Not Due       Topic Last Completion Date    High Dose Statin 08/12/2024    Hemoglobin A1c (Diabetic Prevention Screening) 08/12/2024    Lipid Panel 08/12/2024    Influenza Vaccine Not Due       Future Appointments   Date Time Provider Department Center   10/22/2024  1:00 PM Larry Martinez MD [facility]   2/12/2025  7:20 AM Alfreda Jones FNP [facility] [place]        Follow up in about 6 months (around 2/12/2025) for F2F, Follow up, Med check, Lab review, RTC PRN.          Signature:        KELVIN Pineda  [facility]  [address] [id number]    Date of encounter: 8/12/24

## 2024-08-12 NOTE — ASSESSMENT & PLAN NOTE
Managed by [facility] Cardiology clinic.  Continue atorvastatin, carvedilol, and losartan.   Patient states he is no longer taking furosemide.  Last ECHO (1/24/2024) showed EF of less than 40%.  Notify the clinic if you gain 3 or more pounds in one day or 5 or more pounds in one week.  Stressed importance of daily morning weights after urination but prior to breakfast on the same scale.  Low Sodium Diet (Dash Diet - less than 2 grams of sodium per day).  Follow a low cholesterol, low saturated fat diet with less that 200mg of cholesterol a day.  Cut down on alcohol if you have more than 1 drink a day (for women) or 2 drinks a day (for men).  Maintain healthy weight with goal BMI <30. Perform 30 minutes of daily physical activity 5 days per week.  Report to ER for chest pain, SOB, difficulty breathing, abdominal distention or significant edema to lower extremities.

## 2024-08-12 NOTE — PATIENT INSTRUCTIONS
REMINDER: Please complete labs within 1 week of appointment.   Please complete satisfaction survey when received. Thank you.      Jayme Aguilar,     If you are due for any health screening(s) below please notify me so we can arrange them to be ordered and scheduled. Most healthy patients at your age complete them, but you are free to accept or refuse.     If you can't do it, I'll definitely understand. If you can, I'd certainly appreciate it!    Tests to Keep You Healthy    Last Blood Pressure <= 139/89 (8/12/2024): Yes  Tobacco Cessation: NO      Were here to help you quit smoking     Our records indicated that you are still smoking. One of the best things you can do for your health is to stop smoking and we are here to help.     Talk with your provider about our Smoking Cessation Program and how we can support you on your journey.

## 2024-08-12 NOTE — ASSESSMENT & PLAN NOTE
Wellness labs - 8/12/2024.   Prostate Cancer Screening - 8/12/2024 - PSA: 0.70. Follow up annually.   Colon Cancer Screening - Deferred due to age. Will initiate testing at age 45.  Eye Exam -Several years. List of local eye doctors given to patient.   Dental Exam - Several years. List of local dentists given to patient.   Vaccinations: Flu - Not currently being offered, recommended annually. / Pneumococcal - Declines / Tetanus - Declines

## 2024-09-30 LAB — CRC RECOMMENDATION EXT: NORMAL

## 2024-10-09 ENCOUNTER — DOCUMENTATION ONLY (OUTPATIENT)
Dept: INTERNAL MEDICINE | Facility: CLINIC | Age: 44
End: 2024-10-09
Payer: MEDICAID

## 2025-01-07 ENCOUNTER — OFFICE VISIT (OUTPATIENT)
Dept: CARDIOLOGY | Facility: CLINIC | Age: 45
End: 2025-01-07
Payer: MEDICAID

## 2025-01-07 VITALS
WEIGHT: 193.13 LBS | RESPIRATION RATE: 20 BRPM | TEMPERATURE: 98 F | HEART RATE: 71 BPM | OXYGEN SATURATION: 98 % | BODY MASS INDEX: 27.04 KG/M2 | SYSTOLIC BLOOD PRESSURE: 150 MMHG | DIASTOLIC BLOOD PRESSURE: 92 MMHG | HEIGHT: 71 IN

## 2025-01-07 DIAGNOSIS — E78.2 MIXED HYPERLIPIDEMIA: ICD-10-CM

## 2025-01-07 DIAGNOSIS — I50.9 CONGESTIVE HEART FAILURE, UNSPECIFIED HF CHRONICITY, UNSPECIFIED HEART FAILURE TYPE: Primary | ICD-10-CM

## 2025-01-07 PROCEDURE — 99214 OFFICE O/P EST MOD 30 MIN: CPT | Mod: PBBFAC | Performed by: INTERNAL MEDICINE

## 2025-01-07 RX ORDER — ATORVASTATIN CALCIUM 80 MG/1
80 TABLET, FILM COATED ORAL NIGHTLY
Qty: 90 TABLET | Refills: 1 | Status: SHIPPED | OUTPATIENT
Start: 2025-01-07 | End: 2025-07-06

## 2025-01-07 RX ORDER — CARVEDILOL 12.5 MG/1
12.5 TABLET ORAL 2 TIMES DAILY
COMMUNITY
End: 2025-01-07 | Stop reason: SDUPTHER

## 2025-01-07 RX ORDER — CARVEDILOL 25 MG/1
25 TABLET ORAL 2 TIMES DAILY
Qty: 180 TABLET | Refills: 1 | Status: SHIPPED | OUTPATIENT
Start: 2025-01-07 | End: 2025-07-06

## 2025-01-07 NOTE — PROGRESS NOTES
"Cardiology Attending  01/07/2025 3:54 PM    I evaluated Jeff Sanchez Jr. in Cardiology Clinic and discussed the patient's symptoms, findings, and management plan with the resident.   Mr. Jeff Sanchez Jr. is a 44 y.o. male.  The patient is seen in cardiology clinic for hypertension, hyperlipidemia, HFrEF.     Blood pressure (!) 150/92, pulse 71, temperature 98.4 °F (36.9 °C), temperature source Oral, resp. rate 20, height 5' 11" (1.803 m), weight 87.6 kg (193 lb 2 oz), SpO2 98%.  The patient is in no apparent distress.    LEVEL OF EXERTION:  The patient place with his kids, occasionally does some work    Current Outpatient Medications   Medication Instructions    aspirin (ECOTRIN) 81 mg, Daily    atorvastatin (LIPITOR) 80 mg, Oral, Nightly    carvediloL (COREG) 25 mg, Oral, 2 times daily    EScitalopram oxalate (LEXAPRO) 10 mg, Oral, Daily    sacubitriL-valsartan (ENTRESTO)  mg per tablet 1 tablet, Oral, 2 times daily     Echo 01/24/2024    Interpretation Summary   Biplane (2D) method of discs ejection fraction is 40%. There is normal diastolic function.    Lab Results   Component Value Date    CHOL 143 08/12/2024      Lab Results   Component Value Date    HGB 14.8 08/12/2024      Lab Results   Component Value Date    CREATININE 1.04 08/12/2024      Lab Results   Component Value Date    BNP <10.0 03/14/2024          The plan is to Increase dose of Coreg from 12.5-25 mg b.i.d. due to hypertension  Blood pressure checking nurse's clinic  FOLLOW UP:  6 months    Larry Martinez MD   "

## 2025-01-07 NOTE — PROGRESS NOTES
CHIEF COMPLAINT:   Chief Complaint   Patient presents with    f/u denies chest pain or sob since LV questions on stop dri                                                  HPI:  Jeff Sanchez Jr. 44 y.o. male past medical history of HFrEF (EF 40%), GERD, DM 2, HLD, anxiety, depression presents to Cardiology Clinic today for a six-month follow up.  Patient states that he has been having hemorrhoid banding procedures over the last month and has discontinued taken his GDM T throughout this time.  He states that he just restarted taking his medications this week.  Patient has no complaints.  He denies any chest pain or any shortness of breath.  He reports that he is able to be active and play with children without becoming short of breath.  He denies any swelling.  No diaphoresis.  He states that he no longer use methamphetamine.  He does continue smoke marijuana and cigarettes.                                                                                                                                                                                                                                                                                      CARDIAC TESTING:  No results found for this or any previous visit.    No results found for this or any previous visit.     No results found for this or any previous visit.       Patient Active Problem List   Diagnosis    Congestive heart failure    Vapes non-nicotine containing substance    Left ventricular thrombus    Gastroesophageal reflux disease without esophagitis    Abdominal cramping    BMI 26.0-26.9,adult    Mixed hyperlipidemia    CHF exacerbation    Cardiomyopathy secondary to drug    Elevated troponin    Anxiety and depression    Positive depression screening    Primary hypertension    Loss of appetite    Internal hemorrhoid    Chronic midline low back pain without sciatica    Chest pain in adult    Change in color of pigmented skin lesion    BRBPR (bright red  blood per rectum)    Well adult exam     Past Surgical History:   Procedure Laterality Date    SPINE SURGERY  lower fusion     Social History     Socioeconomic History    Marital status: Single   Tobacco Use    Smoking status: Every Day     Types: Cigars    Smokeless tobacco: Former    Tobacco comments:     Smokes 3 cigars a day   Substance and Sexual Activity    Alcohol use: Not Currently    Drug use: Yes     Types: Marijuana    Sexual activity: Yes     Partners: Female     Social Drivers of Health     Financial Resource Strain: Medium Risk (8/3/2023)    Overall Financial Resource Strain (CARDIA)     Difficulty of Paying Living Expenses: Somewhat hard   Food Insecurity: No Food Insecurity (8/3/2023)    Hunger Vital Sign     Worried About Running Out of Food in the Last Year: Never true     Ran Out of Food in the Last Year: Never true   Transportation Needs: No Transportation Needs (8/3/2023)    PRAPARE - Transportation     Lack of Transportation (Medical): No     Lack of Transportation (Non-Medical): No   Physical Activity: Inactive (8/3/2023)    Exercise Vital Sign     Days of Exercise per Week: 0 days     Minutes of Exercise per Session: 0 min   Housing Stability: Unknown (8/3/2023)    Housing Stability Vital Sign     Unable to Pay for Housing in the Last Year: No     Unstable Housing in the Last Year: No        Family History   Problem Relation Name Age of Onset    Hypertension Mother rich     Diabetes Mother rich     Stroke Mother rich     Hyperlipidemia Mother rich     Lung cancer Father      Schizophrenia Father      Hypertension Sister bubba     Stroke Sister bubba     Heart attack Maternal Grandfather      Heart attack Paternal Grandfather       Review of patient's allergies indicates:  No Known Allergies      ROS:  Review of Systems   HENT: Negative.     Eyes: Negative.    Respiratory:  Negative for shortness of breath.    Cardiovascular: Negative.  Negative for chest pain, palpitations, orthopnea, claudication,  "leg swelling and PND.   Gastrointestinal: Negative.    Genitourinary: Negative.    Musculoskeletal: Negative.    Skin: Negative.    Neurological: Negative.  Negative for dizziness and weakness.   Endo/Heme/Allergies: Negative.        PHYSICAL EXAM:  Visit Vitals  BP (!) 157/95 (BP Location: Left arm, Patient Position: Sitting)   Pulse 71   Temp 98.4 °F (36.9 °C) (Oral)   Resp 20   Ht 5' 11" (1.803 m)   Wt 87.6 kg (193 lb 2 oz)   SpO2 98%   BMI 26.94 kg/m²       Physical Exam  Constitutional:       Appearance: Normal appearance.   HENT:      Head: Normocephalic.      Mouth/Throat:      Mouth: Mucous membranes are moist.   Eyes:      Extraocular Movements: Extraocular movements intact.   Neck:      Vascular: No carotid bruit.   Cardiovascular:      Rate and Rhythm: Normal rate and regular rhythm.      Pulses: Normal pulses.      Heart sounds: Murmur heard.   Pulmonary:      Effort: Pulmonary effort is normal.   Abdominal:      General: There is no distension.   Musculoskeletal:         General: Normal range of motion.      Right lower leg: No edema.      Left lower leg: No edema.   Skin:     General: Skin is warm and dry.   Neurological:      General: No focal deficit present.      Mental Status: He is alert and oriented to person, place, and time.   Psychiatric:         Mood and Affect: Mood normal.         Behavior: Behavior normal.         Current Outpatient Medications   Medication Instructions    aspirin (ECOTRIN) 81 mg, Daily    atorvastatin (LIPITOR) 80 mg, Oral, Nightly    carvediloL (COREG) 25 mg, Oral, 2 times daily    EScitalopram oxalate (LEXAPRO) 10 mg, Oral, Daily    sacubitriL-valsartan (ENTRESTO)  mg per tablet 1 tablet, Oral, 2 times daily        All medications, laboratory studies, cardiac diagnostic imaging reviewed.     Lab Results   Component Value Date    LDL 61.00 08/12/2024    LDL 51.00 08/03/2023    TRIG 263 (H) 08/12/2024    TRIG 235 (H) 08/03/2023    CREATININE 1.04 08/12/2024    MG " 1.90 03/14/2024    K 4.3 08/12/2024        ASSESSMENT/PLAN:    Essential hypertension  - BP today 157/95.  On repeat systolics are in the 150s  -continue Entresto  mg b.i.d, will increase Coreg to next dose of 25 mg b.i.d.  -Counseled on low-sodium, heart healthy diet and exercise as tolerated  -BP log provided to patient    Congestive heart failure, unspecified HF chronicity, unspecified heart failure type  Cardiomyopathy likely secondary to drug use  -most recent echocardiogram 01/24/2024: EF 40%, severe concentric hypertrophy.  -Counseled on low-sodium (<2g of salt per day), heart healthy diet and exercise as tolerated  -continue Entresto  mg b.i.d, increase Coreg to 25mg BID    Hyperlipidemia  - LDL 51 per labs 8.3.23;  (decreased from prior)  - increasing atorvastatin to 80 mg dose, sent to pharmacy of choice  - Counseled on low-cholesterol, heart healthy diet and exercise as tolerated   - Management per PCP    History of Substance Abuse  - Prior history of methamphetamine use, states that he has not used any illicit drugs since he was diagnosed with HF/cardiomyopathy  - Counseled on importance of abstaining from illicit drugs    Tobacco Abuse  - Reports vaping and smoking cigars  - Counseled on cessation, states that he is trying to quit on his own     RTC in 6 months.  Coreg increased to 25 mg b.i.d. and atorvastatin increased to full dose 80 mg daily.  Blood pressure nursing visit in the next month.      Tadeo Feliz MD  [facility] Internal Medicine PGY 3

## 2025-02-12 ENCOUNTER — LAB VISIT (OUTPATIENT)
Dept: LAB | Facility: HOSPITAL | Age: 45
End: 2025-02-12
Payer: MEDICAID

## 2025-02-12 ENCOUNTER — OFFICE VISIT (OUTPATIENT)
Dept: INTERNAL MEDICINE | Facility: CLINIC | Age: 45
End: 2025-02-12
Payer: MEDICAID

## 2025-02-12 VITALS
HEIGHT: 71 IN | BODY MASS INDEX: 27.13 KG/M2 | SYSTOLIC BLOOD PRESSURE: 158 MMHG | WEIGHT: 193.81 LBS | OXYGEN SATURATION: 99 % | RESPIRATION RATE: 16 BRPM | DIASTOLIC BLOOD PRESSURE: 90 MMHG | TEMPERATURE: 98 F | HEART RATE: 46 BPM

## 2025-02-12 DIAGNOSIS — Z12.5 SCREENING FOR PROSTATE CANCER: ICD-10-CM

## 2025-02-12 DIAGNOSIS — Z72.0 TOBACCO USER: ICD-10-CM

## 2025-02-12 DIAGNOSIS — I10 PRIMARY HYPERTENSION: ICD-10-CM

## 2025-02-12 DIAGNOSIS — E78.2 MIXED HYPERLIPIDEMIA: ICD-10-CM

## 2025-02-12 DIAGNOSIS — L81.9 CHANGE IN COLOR OF PIGMENTED SKIN LESION: ICD-10-CM

## 2025-02-12 DIAGNOSIS — F32.A ANXIETY AND DEPRESSION: ICD-10-CM

## 2025-02-12 DIAGNOSIS — F41.9 ANXIETY AND DEPRESSION: ICD-10-CM

## 2025-02-12 DIAGNOSIS — R73.01 IMPAIRED FASTING GLUCOSE: ICD-10-CM

## 2025-02-12 DIAGNOSIS — Z13.21 ENCOUNTER FOR VITAMIN DEFICIENCY SCREENING: ICD-10-CM

## 2025-02-12 DIAGNOSIS — Z13.29 SCREENING FOR THYROID DISORDER: ICD-10-CM

## 2025-02-12 DIAGNOSIS — I10 PRIMARY HYPERTENSION: Primary | ICD-10-CM

## 2025-02-12 LAB
ALBUMIN SERPL-MCNC: 4 G/DL (ref 3.5–5)
ALBUMIN/GLOB SERPL: 1.1 RATIO (ref 1.1–2)
ALP SERPL-CCNC: 95 UNIT/L (ref 40–150)
ALT SERPL-CCNC: 30 UNIT/L (ref 0–55)
ANION GAP SERPL CALC-SCNC: 8 MEQ/L
AST SERPL-CCNC: 24 UNIT/L (ref 5–34)
BASOPHILS # BLD AUTO: 0.05 X10(3)/MCL
BASOPHILS NFR BLD AUTO: 0.6 %
BILIRUB SERPL-MCNC: 0.3 MG/DL
BUN SERPL-MCNC: 15.3 MG/DL (ref 8.9–20.6)
CALCIUM SERPL-MCNC: 9.5 MG/DL (ref 8.4–10.2)
CHLORIDE SERPL-SCNC: 107 MMOL/L (ref 98–107)
CHOLEST SERPL-MCNC: 136 MG/DL
CHOLEST/HDLC SERPL: 6 {RATIO} (ref 0–5)
CO2 SERPL-SCNC: 25 MMOL/L (ref 22–29)
CREAT SERPL-MCNC: 1.08 MG/DL (ref 0.72–1.25)
CREAT UR-MCNC: 142.3 MG/DL (ref 63–166)
CREAT/UREA NIT SERPL: 14
EOSINOPHIL # BLD AUTO: 0.23 X10(3)/MCL (ref 0–0.9)
EOSINOPHIL NFR BLD AUTO: 2.7 %
ERYTHROCYTE [DISTWIDTH] IN BLOOD BY AUTOMATED COUNT: 13.4 % (ref 11.5–17)
GFR SERPLBLD CREATININE-BSD FMLA CKD-EPI: >60 ML/MIN/1.73/M2
GLOBULIN SER-MCNC: 3.7 GM/DL (ref 2.4–3.5)
GLUCOSE SERPL-MCNC: 135 MG/DL (ref 74–100)
HCT VFR BLD AUTO: 47.5 % (ref 42–52)
HDLC SERPL-MCNC: 22 MG/DL (ref 35–60)
HGB BLD-MCNC: 15.8 G/DL (ref 14–18)
IMM GRANULOCYTES # BLD AUTO: 0.02 X10(3)/MCL (ref 0–0.04)
IMM GRANULOCYTES NFR BLD AUTO: 0.2 %
LYMPHOCYTES # BLD AUTO: 1.67 X10(3)/MCL (ref 0.6–4.6)
LYMPHOCYTES NFR BLD AUTO: 19.7 %
MCH RBC QN AUTO: 29.8 PG (ref 27–31)
MCHC RBC AUTO-ENTMCNC: 33.3 G/DL (ref 33–36)
MCV RBC AUTO: 89.6 FL (ref 80–94)
MICROALBUMIN UR-MCNC: 76.6 UG/ML
MICROALBUMIN/CREAT RATIO PNL UR: 53.8 MG/GM CR (ref 0–30)
MONOCYTES # BLD AUTO: 0.88 X10(3)/MCL (ref 0.1–1.3)
MONOCYTES NFR BLD AUTO: 10.4 %
NEUTROPHILS # BLD AUTO: 5.62 X10(3)/MCL (ref 2.1–9.2)
NEUTROPHILS NFR BLD AUTO: 66.4 %
NRBC BLD AUTO-RTO: 0 %
PLATELET # BLD AUTO: 153 X10(3)/MCL (ref 130–400)
PMV BLD AUTO: 10.4 FL (ref 7.4–10.4)
POTASSIUM SERPL-SCNC: 4.2 MMOL/L (ref 3.5–5.1)
PROT SERPL-MCNC: 7.7 GM/DL (ref 6.4–8.3)
RBC # BLD AUTO: 5.3 X10(6)/MCL (ref 4.7–6.1)
SODIUM SERPL-SCNC: 140 MMOL/L (ref 136–145)
TRIGL SERPL-MCNC: 422 MG/DL (ref 34–140)
WBC # BLD AUTO: 8.47 X10(3)/MCL (ref 4.5–11.5)

## 2025-02-12 PROCEDURE — 3080F DIAST BP >= 90 MM HG: CPT | Mod: CPTII,,,

## 2025-02-12 PROCEDURE — 80053 COMPREHEN METABOLIC PANEL: CPT

## 2025-02-12 PROCEDURE — 87086 URINE CULTURE/COLONY COUNT: CPT

## 2025-02-12 PROCEDURE — 3066F NEPHROPATHY DOC TX: CPT | Mod: CPTII,,,

## 2025-02-12 PROCEDURE — 99215 OFFICE O/P EST HI 40 MIN: CPT | Mod: PBBFAC

## 2025-02-12 PROCEDURE — 36415 COLL VENOUS BLD VENIPUNCTURE: CPT

## 2025-02-12 PROCEDURE — 1159F MED LIST DOCD IN RCRD: CPT | Mod: CPTII,,,

## 2025-02-12 PROCEDURE — 85025 COMPLETE CBC W/AUTO DIFF WBC: CPT

## 2025-02-12 PROCEDURE — 3077F SYST BP >= 140 MM HG: CPT | Mod: CPTII,,,

## 2025-02-12 PROCEDURE — 82570 ASSAY OF URINE CREATININE: CPT

## 2025-02-12 PROCEDURE — 3060F POS MICROALBUMINURIA REV: CPT | Mod: CPTII,,,

## 2025-02-12 PROCEDURE — 80061 LIPID PANEL: CPT

## 2025-02-12 PROCEDURE — 1160F RVW MEDS BY RX/DR IN RCRD: CPT | Mod: CPTII,,,

## 2025-02-12 PROCEDURE — 99214 OFFICE O/P EST MOD 30 MIN: CPT | Mod: S$PBB,,,

## 2025-02-12 PROCEDURE — 3008F BODY MASS INDEX DOCD: CPT | Mod: CPTII,,,

## 2025-02-12 NOTE — PROGRESS NOTES
"    PATIENT NAME: Jeff Sanchez Jr.  : 1980  DATE: 25  MRN: [id number]          Reason for Visit/Chief Complaint   Lab Review, Follow-up, and Hypertension       History of Present Illness (HPI)     Jeff Sanchez Jr. is a 44 y.o. White male presenting in clinic today for an Lab Review, Follow-up, and Hypertension. PMH: CHF (EF less than 20% on 5/15/2022, 40% on 2024), left ventricular thrombus (no longer on warfarin), and lumbar radiculopathy with laminectomy, decompression, fusion L4-5-S1. Of note, he is no longer taking warfarin, and is taking a low dose ASA. Family on mother and father's side has a strong cardiac history. Patient's younger sister has suffered a stroke previously.     He is currently followed by:  [facility] cardiology clinic - last office visit 2025. Coreg increased to 25 mg b.i.d. and atorvastatin increased to full dose 80 mg daily. Missed nurse visit on 2025.      CARDIOVASCULAR:  BP-158/90 - not at goal. He reports shortness of breath occurring even at rest. He denies chest pain or dizziness. Following his 2025 cardiology visit, his medications were adjusted with an increase in metoprolol to 25 mg daily and atorvastatin to 80 mg. He checks blood pressure at home but reports misplacing his records. He missed a scheduled nurse follow-up due to issues with his blood pressure monitor.    HEMORRHOIDS:  He underwent hemorrhoid banding in November for multiple hemorrhoids. He reports recent recurrence with significant bleeding, describing it as "profuse," which led him to temporarily discontinue aspirin. He continues to experience discomfort.    DERMATOLOGIC:  He reports progressive spots developing on his arms. He missed his previously scheduled dermatology appointment on 2024.    SOCIAL HISTORY:  He currently smokes cigars and expresses interest in smoking cessation, acknowledging difficulty quitting independently.    PSYCHIATRIC:  He is no longer taking Lexapro. States " mood is situational. Endorses mood swings.       Patient states he smokes cigars, vapes, and smoke marijuana. Denies alcohol use. Denies chest pain, shortness of breath, cough, headache, dizziness, weakness, abdominal pain, nausea, vomiting, diarrhea, constipation, dysuria, SI, and HI.     Prostate Cancer Screening - 8/12/2024 - PSA: 0.70. Follow up annually.   Colon Cancer Screening - Deferred due to age. Will initiate testing at age 45.  Eye Exam -Several years. List of local eye doctors given to patient.   Dental Exam - Several years. List of local dentists given to patient.   Vaccinations: Flu - Not currently being offered, recommended annually. / Pneumococcal - Declines / Tetanus - Declines        Review of Systems     Review of Systems   HENT: Negative.     Eyes: Negative.    Respiratory: Negative.  Negative for shortness of breath.    Cardiovascular: Negative.    Gastrointestinal: Negative.    Endocrine: Negative.    Genitourinary: Negative.    Musculoskeletal: Negative.    Skin:  Positive for color change.   Allergic/Immunologic: Negative.    Neurological: Negative.  Negative for dizziness.   Hematological: Negative.    Psychiatric/Behavioral: Negative.     All other systems reviewed and are negative.      Medical / Social / Family History     Past Medical History:   Diagnosis Date    Cerebrovascular disease     CHF (congestive heart failure)     HLD (hyperlipidemia)     Hypertension     Lumbar herniated disc     Thrombus     LEFT VENTRICULAR         Past Surgical History:   Procedure Laterality Date    SPINE SURGERY  lower fusion     Social History  Jeff Sanchez Jr.'s  reports that he has been smoking cigars. He has quit using smokeless tobacco. He reports that he does not currently use alcohol. He reports current drug use. Drug: Marijuana.    Family History  Jeff Sanchez Jr.'s family history includes Diabetes in his mother; Heart attack in his maternal grandfather and paternal grandfather;  "Hyperlipidemia in his mother; Hypertension in his mother and sister; Lung cancer in his father; Schizophrenia in his father; Stroke in his mother and sister.    Medications and Allergies     Medications  Current Outpatient Medications   Medication Instructions    aspirin (ECOTRIN) 81 mg, Daily    atorvastatin (LIPITOR) 80 mg, Oral, Nightly    carvediloL (COREG) 25 mg, Oral, 2 times daily    sacubitriL-valsartan (ENTRESTO)  mg per tablet 1 tablet, Oral, 2 times daily       Allergies  Review of patient's allergies indicates:  No Known Allergies    Physical Examination   Visit Vitals  BP (!) 158/90 (BP Location: Left arm, Patient Position: Sitting)   Pulse (!) 46   Temp 97.7 °F (36.5 °C) (Oral)   Resp 16   Ht 5' 11" (1.803 m)   Wt 87.9 kg (193 lb 12.8 oz)   SpO2 99%   BMI 27.03 kg/m²         Physical Exam  Vitals reviewed.   Constitutional:       Appearance: Normal appearance. He is normal weight.   HENT:      Head: Normocephalic.      Nose: Nose normal.      Mouth/Throat:      Mouth: Mucous membranes are moist.      Pharynx: Oropharynx is clear.   Eyes:      Extraocular Movements: Extraocular movements intact.      Conjunctiva/sclera: Conjunctivae normal.      Pupils: Pupils are equal, round, and reactive to light.   Cardiovascular:      Rate and Rhythm: Normal rate and regular rhythm.      Heart sounds: Normal heart sounds.   Pulmonary:      Effort: Pulmonary effort is normal.      Breath sounds: Normal breath sounds.   Abdominal:      General: Abdomen is flat. Bowel sounds are normal.      Palpations: Abdomen is soft.   Musculoskeletal:         General: Normal range of motion.      Cervical back: Normal range of motion.      Right lower leg: No edema.      Left lower leg: No edema.   Skin:     General: Skin is warm and dry.      Capillary Refill: Capillary refill takes less than 2 seconds.   Neurological:      General: No focal deficit present.      Mental Status: He is alert and oriented to person, place, and " time.   Psychiatric:         Mood and Affect: Mood normal.           Results     Lab Results   Component Value Date    WBC 8.47 02/12/2025    RBC 5.30 02/12/2025    HGB 15.8 02/12/2025    HCT 47.5 02/12/2025    MCV 89.6 02/12/2025    MCH 29.8 02/12/2025    MCHC 33.3 02/12/2025    RDW 13.4 02/12/2025     02/12/2025    MPV 10.4 02/12/2025      Lab Results   Component Value Date     02/12/2025    K 4.2 02/12/2025    CO2 25 02/12/2025    GLUCOSE 135 (H) 02/12/2025    BUN 15.3 02/12/2025    CREATININE 1.08 02/12/2025    LABPROT 7.7 02/12/2025    ALBUMIN 4.0 02/12/2025    BILITOT 0.3 02/12/2025    ALKPHOS 95 02/12/2025    AST 24 02/12/2025    ALT 30 02/12/2025    AGAP 8.0 02/12/2025    EGFRNORACEVR >60 02/12/2025     Lab Results   Component Value Date    TSH 1.175 08/12/2024    T4FREE 1.28 03/30/2022     Lab Results   Component Value Date    CHOL 136 02/12/2025    HDL 22 (L) 02/12/2025    LDL 61.00 08/12/2024    TRIG 422 (H) 02/12/2025     Lab Results   Component Value Date    SGUA 1.018 08/12/2024    PROTEINUA Negative 08/12/2024    BILIRUBINUA Negative 08/12/2024    WBCUA 0-5 08/12/2024    RBCUA 0-5 08/12/2024    BACTERIA None Seen 08/12/2024    LEUKOCYTESUR Negative 08/12/2024    UROBILINOGEN Normal 08/12/2024     Lab Results   Component Value Date    CREATRANDUR 142.3 02/12/2025    MICALBCREAT 53.8 (H) 02/12/2025     Lab Results   Component Value Date    BSDVZECO53CO 40 08/12/2024     Lab Results   Component Value Date    HIV Nonreactive 05/22/2023    HEPAIGM Nonreactive 05/22/2023    HEPBSAG Nonreactive 05/22/2023    HEPCAB Nonreactive 05/22/2023     Assessment and Plan (including Health Maintenance)   Assessment & Plan    IMPRESSION:  - Elevated blood pressure (158/90) noted; cardiology increased Coreg to 25 mg daily and atorvastatin to 80 mg in January  - Low heart rate precludes further increase in Coreg; messaging cardiology for recommendations  - Recent hemorrhoid banding by Dr. English; intermittent  bleeding persists  - Elevated fasting glucose (135 mg/dL); will add A1C to next labs if trend continues  - Triglycerides markedly elevated (422 mg/dL), likely due to recent high-fat meal; unable to calculate LDL  - HDL low  - Mood swings reported; patient using Lexapro as needed  - Shortness of breath reported, even at rest    HYPERTENSION:  - Instructed the patient to continue monitoring blood pressure at home.  - Noted that the patient's current blood pressure is elevated at 158/90.  - Messaging cardiology for blood pressure management recommendations.  - Anticipate call from cardiology to schedule nurse visit for blood pressure management.  - Continued Entresto and Carvedilol for blood pressure management.  - Noted that cardiology increased Carvedilol to 25 mg daily and atorvastatin to 80 mg.  - Continued Entresto.  - Continued Carvedilol 25 mg daily.    BRADYCARDIA:  - Noted that the patient's heart rate is 46, which is considered low.  - Plan to consult cardiology for management of low heart rate and blood pressure.    HEMORRHOIDS:  - Advised the patient to use hemorrhoid cream as needed for symptoms.  - Referred the patient to surgery clinic for potential hemorrhoidectomy evaluation.  - Instructed the patient to contact the office if hemorrhoid bleeding worsens or persists.  - Noted that the patient had hemorrhoid banding in November and experienced significant bleeding afterwards, with recent minor bleeding reported.  - Advised contacting Dr. English's office if bleeding becomes significant.  - Noted that the patient temporarily stopped taking aspirin due to bleeding.    HYPERGLYCEMIA:  - Educated the patient on symptoms of hypoglycemia and appropriate management.  - Plan to add HbA1C to next lab work if fasting glucose remains elevated.  - Noted that the patient's fasting glucose was elevated at 135 mg/dL.  - Recommend dietary changes, particularly avoiding high-fat foods before labs.  - Advised monitoring  glucose levels.    HYPERLIPIDEMIA:  - Explained the relationship between high-fat diet and elevated triglycerides.  - Continued atorvastatin 80 mg daily.  - Noted that the patient's triglycerides are significantly elevated at 422 mg/dL.  - Attributed high triglycerides to recent high-fat meal.  - Recommend low-fat diet to manage triglycerides.  - Noted that LDL could not be calculated due to high triglycerides.  - Discussed the importance of cardiovascular exercise for increasing HDL levels.  - Noted that the patient's HDL cholesterol is lower than desired.  - Recommend increasing cardio exercise to improve HDL levels.  - Patient to implement low-fat diet, especially before labs.    ANXIETY:  - Continued Lexapro as needed for mood symptoms.  - Noted that the patient reports situational anxiety and mood swings.  - Acknowledged the patient's intermittent need for anxiety medication.  - Noted that the patient is not currently taking Lexapro for anxiety.    SKIN LESIONS:  - Contacting minor surgery clinic to re-evaluate arm lesions.  - Noted that the patient has spots on arms that are progressing.  - Plan to refer the patient for biopsy of skin spots.    FOLLOW UP:  - Follow up in 6 months.        Problem List Items Addressed This Visit          Psychiatric    Anxiety and depression    Current Assessment & Plan     Denies SI/HI  Self d/c'd lexapro.  Read positive daily meditations, avoid negative media, set healthy boundaries.   Exercise daily, keep consistent sleep pattern, eat a healthy diet.   Establish good social support, make changes to reduce stress.   Do not drink alcohol or use illicit drugs.   Reports any symptoms of suicidal/homicidal ideations or self harm immediately, go to nearest emergency room.               Derm    Change in color of pigmented skin lesion    Current Assessment & Plan     Will send message to minor surgery clinic to schedule patient - missed appt in 11/2024.            Cardiac/Vascular     Mixed hyperlipidemia    Current Assessment & Plan     Lab Results   Component Value Date    LDL 61.00 08/12/2024       Lab Results   Component Value Date    TRIG 263 (H) 08/12/2024       Lab Results   Component Value Date    HDL 29 (L) 08/12/2024        Lab Results   Component Value Date    CHOL 143 08/12/2024      Continue atorvastatin 80 mg  as prescribed.  Follow a low cholesterol, low saturated fat diet with less than 200 mg of cholesterol a day.   Avoid fried foods and high saturated fats.  Add flax seed or fish oil supplements to diet.   Increase dietary fiber.   Regular exercise improves cholesterol levels.  Physical activity 5 times a week for 30 minutes per day (or 150 minutes per week).   Stressed importance of dietary modifications.          Relevant Orders    Lipid Panel    Comprehensive Metabolic Panel    Primary hypertension - Primary    Current Assessment & Plan     BP Readings from Last 3 Encounters:   01/07/25 (!) 150/92   08/12/24 135/88   05/29/24 128/72      At goal.  Follow a low sodium (less than 2 grams of sodium per day), DASH diet.   Continue losartan and carvedilol as prescribed.  Bring BP log to cardiology clinic.  Monitor blood pressure and report any consistent values greater than 140/90 and keep a log.  Encouraged smoking cessation to aid in BP reduction and co-morbidities.   Maintain healthy weight with a BMI goal of <30.   Aerobic exercise for 150 minutes per week (or 5 days a week for 30 minutes each day).          Relevant Orders    Urinalysis, Reflex to Urine Culture    Microalbumin/Creatinine Ratio, Urine    Comprehensive Metabolic Panel    CBC Auto Differential       Endocrine    BMI 27.0-27.9,adult    Current Assessment & Plan     Body mass index is 27.03 kg/m². (At goal).             Other    Tobacco user    Current Assessment & Plan     Smoking cessation discussed for 3 minutes.   Pt ready to quit.  Referred to Smoking Cessation program.  Discussed benefits of quitting  including improved health, decreased cardiac/vascular/pulmonary/stroke risks as well as saving money.          Relevant Orders    Ambulatory referral/consult to Smoking Cessation Program     Other Visit Diagnoses       Impaired fasting glucose        Relevant Orders    Hemoglobin A1C    Comprehensive Metabolic Panel    Screening for thyroid disorder        Relevant Orders    TSH    T4, Free    Screening for prostate cancer        Relevant Orders    PSA, Screening    Encounter for vitamin deficiency screening        Relevant Orders    Vitamin D                 Health Maintenance Due   Topic Date Due    COVID-19 Vaccine (1 - 2024-25 season) Never done     Tests to Keep You Healthy    Last Blood Pressure <= 139/89 (2/12/2025): NO  Tobacco Cessation: NO      Health Maintenance Topics with due status: Not Due       Topic Last Completion Date    Hemoglobin A1c (Diabetic Prevention Screening) 08/12/2024    High Dose Statin 02/12/2025    Lipid Panel 02/12/2025    RSV Vaccine (Age 60+ and Pregnant patients) Not Due       Future Appointments   Date Time Provider Department Center   7/8/2025  1:30 PM Larry [facility]    8/12/2025  8:20 AM Alfreda Jones F[facility]         Follow up in about 6 months (around 8/12/2025) for F2F, Follow up, Med check, Lab review, Wellness, RTC PRN.          Signature:        KELVIN Pineda  [facility]  [address] [id number]    Date of encounter: 2/12/25        This note was generated with the assistance of ambient listening technology. Verbal consent was obtained by the patient and accompanying visitor(s) for the recording of patient appointment to facilitate this note. I attest to having reviewed and edited the generated note for accuracy, though some syntax or spelling errors may persist. Please contact the author of this note for any clarification.

## 2025-02-12 NOTE — PATIENT INSTRUCTIONS
REMINDER: Please complete labs within 1 week of appointment.   Please complete satisfaction survey when received. Thank you.    Jayme Aguilar,     If you are due for any health screening(s) below please notify me so we can arrange them to be ordered and scheduled. Most healthy patients at your age complete them, but you are free to accept or refuse.     If you can't do it, I'll definitely understand. If you can, I'd certainly appreciate it!    Tests to Keep You Healthy    Last Blood Pressure <= 139/89 (2/12/2025): NO  Tobacco Cessation: NO      Lets manage your high blood pressure     Your blood pressure was above 140/90 today during your visit. We recommend that you schedule a nurse visit in two weeks to check your blood pressure and discuss ways to support your health goals.     You can also manage your health and record your blood pressure from the comfort of home by keeping a daily blood pressure log. These results are shared with and reviewed by your provider. Please print this form (Daily Blood Pressure Log) to assist you in keeping track of your blood pressure at home.     Schedule your nurse visit in two weeks to learn more about how to track and manage high blood pressure.    Daily Blood Pressure Log    Name:__________________________________                  Date of Birth:_________    Average Blood Pressure:  __________      Date: Time  (a.m.) Blood  Pressure: Pulse  Rate: Time  (p.m.) Blood  Pressure : Pulse  Rate:   Sample 8:37 127/83 84                                                                                                                                                                                   Were here to help you quit smoking     Our records indicated that you are still smoking. One of the best things you can do for your health is to stop smoking and we are here to help.     Talk with your provider about our Smoking Cessation Program and how we can support you on your journey.

## 2025-02-12 NOTE — ASSESSMENT & PLAN NOTE
BP Readings from Last 3 Encounters:   01/07/25 (!) 150/92   08/12/24 135/88   05/29/24 128/72      At goal.  Follow a low sodium (less than 2 grams of sodium per day), DASH diet.   Continue losartan and carvedilol as prescribed.  Bring BP log to cardiology clinic.  Monitor blood pressure and report any consistent values greater than 140/90 and keep a log.  Encouraged smoking cessation to aid in BP reduction and co-morbidities.   Maintain healthy weight with a BMI goal of <30.   Aerobic exercise for 150 minutes per week (or 5 days a week for 30 minutes each day).

## 2025-02-12 NOTE — ASSESSMENT & PLAN NOTE
Lab Results   Component Value Date    LDL 61.00 08/12/2024       Lab Results   Component Value Date    TRIG 263 (H) 08/12/2024       Lab Results   Component Value Date    HDL 29 (L) 08/12/2024        Lab Results   Component Value Date    CHOL 143 08/12/2024      Continue atorvastatin 80 mg  as prescribed.  Follow a low cholesterol, low saturated fat diet with less than 200 mg of cholesterol a day.   Avoid fried foods and high saturated fats.  Add flax seed or fish oil supplements to diet.   Increase dietary fiber.   Regular exercise improves cholesterol levels.  Physical activity 5 times a week for 30 minutes per day (or 150 minutes per week).   Stressed importance of dietary modifications.

## 2025-02-12 NOTE — ASSESSMENT & PLAN NOTE
Smoking cessation discussed for 3 minutes.   Pt ready to quit.  Referred to Smoking Cessation program.  Discussed benefits of quitting including improved health, decreased cardiac/vascular/pulmonary/stroke risks as well as saving money.

## 2025-02-12 NOTE — ASSESSMENT & PLAN NOTE
Denies SI/HI  Self d/c'd lexapro.  Read positive daily meditations, avoid negative media, set healthy boundaries.   Exercise daily, keep consistent sleep pattern, eat a healthy diet.   Establish good social support, make changes to reduce stress.   Do not drink alcohol or use illicit drugs.   Reports any symptoms of suicidal/homicidal ideations or self harm immediately, go to nearest emergency room.

## 2025-02-14 LAB — BACTERIA UR CULT: NO GROWTH

## 2025-03-05 ENCOUNTER — CLINICAL SUPPORT (OUTPATIENT)
Dept: CARDIOLOGY | Facility: CLINIC | Age: 45
End: 2025-03-05
Payer: MEDICAID

## 2025-03-05 VITALS
DIASTOLIC BLOOD PRESSURE: 88 MMHG | OXYGEN SATURATION: 100 % | TEMPERATURE: 98 F | HEIGHT: 71 IN | HEART RATE: 60 BPM | SYSTOLIC BLOOD PRESSURE: 128 MMHG | BODY MASS INDEX: 27.07 KG/M2 | WEIGHT: 193.38 LBS | RESPIRATION RATE: 20 BRPM

## 2025-03-05 DIAGNOSIS — I10 PRIMARY HYPERTENSION: Primary | ICD-10-CM

## 2025-03-05 PROCEDURE — 99214 OFFICE O/P EST MOD 30 MIN: CPT | Mod: PBBFAC

## 2025-03-05 RX ORDER — CARVEDILOL 12.5 MG/1
12.5 TABLET ORAL 2 TIMES DAILY
COMMUNITY
Start: 2025-02-20

## 2025-03-05 NOTE — PROGRESS NOTES
HERE FOR B/P CHECK 128/88 LEFT MANUAL HR---60  STATES HAS BEEN TAKING MEDS FEELING FINE  DENIES ANY OTHER CARDIAC ISSUES   INSTRUCTED TO CONTINUE SAME MED PROTOCOL AND LOW SALT DIET  TO LOG B/P KEEPING IT BELOW 138/88  IF ANY ELEVATION TO CALL US  VERBALIZED UNDERSTANDING  ED PRECAUTIONS GIVEN

## 2025-03-24 DIAGNOSIS — I50.9 CONGESTIVE HEART FAILURE, UNSPECIFIED HF CHRONICITY, UNSPECIFIED HEART FAILURE TYPE: Primary | ICD-10-CM

## 2025-03-24 NOTE — TELEPHONE ENCOUNTER
----- Message from Cerenity sent at 3/24/2025 11:25 AM CDT -----  Regarding: Rx refill  Lcv: 3/5/25Ncv: 7/8/25Pt requests refill on sacubitriL-valsartan (ENTRESTO)  mg per tablet.Pharmacy: [id number] [place] [address] [address]

## 2025-03-25 RX ORDER — SACUBITRIL AND VALSARTAN 97; 103 MG/1; MG/1
1 TABLET, FILM COATED ORAL 2 TIMES DAILY
Qty: 180 TABLET | Refills: 3 | Status: SHIPPED | OUTPATIENT
Start: 2025-03-25

## 2025-04-25 DIAGNOSIS — I10 PRIMARY HYPERTENSION: Primary | ICD-10-CM

## 2025-04-28 RX ORDER — CARVEDILOL 12.5 MG/1
12.5 TABLET ORAL 2 TIMES DAILY
Qty: 60 TABLET | Refills: 8 | Status: SHIPPED | OUTPATIENT
Start: 2025-04-28 | End: 2026-04-28

## 2025-05-21 ENCOUNTER — TELEPHONE (OUTPATIENT)
Dept: INTERNAL MEDICINE | Facility: CLINIC | Age: 45
End: 2025-05-21
Payer: MEDICAID

## 2025-05-21 NOTE — TELEPHONE ENCOUNTER
----- Message from KELVIN Moore sent at 5/21/2025  9:04 AM CDT -----  Regarding: Appt  Please let patient know that I was able to schedule him virtually with me on 5/26/2025 at 3:40. I've cancelled the appt with MARCK Gilliam that was scheduled on 5/27/2025.Thank you,MARGUERITE Enriquez

## 2025-05-26 ENCOUNTER — OFFICE VISIT (OUTPATIENT)
Dept: INTERNAL MEDICINE | Facility: CLINIC | Age: 45
End: 2025-05-26
Payer: MEDICAID

## 2025-05-26 DIAGNOSIS — R19.5 CHANGE IN STOOL: Primary | ICD-10-CM

## 2025-05-26 PROCEDURE — 1160F RVW MEDS BY RX/DR IN RCRD: CPT | Mod: CPTII,95,,

## 2025-05-26 PROCEDURE — 1159F MED LIST DOCD IN RCRD: CPT | Mod: CPTII,95,,

## 2025-05-26 PROCEDURE — 3066F NEPHROPATHY DOC TX: CPT | Mod: CPTII,95,,

## 2025-05-26 PROCEDURE — 98005 SYNCH AUDIO-VIDEO EST LOW 20: CPT | Mod: 95,,,

## 2025-05-26 PROCEDURE — 4010F ACE/ARB THERAPY RXD/TAKEN: CPT | Mod: CPTII,95,,

## 2025-05-26 PROCEDURE — 3060F POS MICROALBUMINURIA REV: CPT | Mod: CPTII,95,,

## 2025-05-26 NOTE — ASSESSMENT & PLAN NOTE
Ddx: Gastroenteritis.  He reports abdominal pain, diarrhea, and nausea.  Stool studies ordered: H. Pylori, ova, cysts, and parasites ordered - will send orders to be collected at [facility].

## 2025-05-26 NOTE — PROGRESS NOTES
"VISIT DATE: 25    PATIENT NAME: Jeff Sanchez Jr.  : 1980  MRN: [id number]     The patient location is: [place], [place]  The chief complaint leading to consultation is: Abdominal pain    Visit type: audiovisual    Visual time with patient: 10 minutes.  20 minutes of total time spent on the encounter, which includes face to face time and non-face to face time preparing to see the patient (eg, review of tests), Obtaining and/or reviewing separately obtained history, Documenting clinical information in the electronic or other health record, Independently interpreting results (not separately reported) and communicating results to the patient/family/caregiver, or Care coordination (not separately reported).     Each patient to whom he or she provides medical services by telemedicine is:  (1) informed of the relationship between the physician and patient and the respective role of any other health care provider with respect to management of the patient; and (2) notified that he or she may decline to receive medical services by telemedicine and may withdraw from such care at any time.    Reason for visit / Chief Complaint:  Stool Color Change       History of Present Illness (HPI):  Jeff Sanchez Jr. is a 44 y.o. White male presenting virtually by audio/visual for Stool Color Change. PMH: CHF (EF less than 20% on 5/15/2022, 40% on 2024), left ventricular thrombus (no longer on warfarin), and lumbar radiculopathy with laminectomy, decompression, fusion L4-5-S1. Of note, he is no longer taking warfarin, and is taking a low dose ASA. Family on mother and father's side has a strong cardiac history. Patient's younger sister has suffered a stroke previously.      Patient reports seeing "a worm like" substance after having a bowel movement approx 1 week ago. He reports abdominal pain, diarrhea, and nausea. He has a h/o hemorrhoids with banding with Dr. English in [place], [place], but he declines a referral at this " time.    Patient states he smokes cigars, vapes, and smoke marijuana. Denies alcohol use. Denies chest pain, shortness of breath, cough, headache, dizziness, weakness, abdominal pain, nausea, vomiting, diarrhea, constipation, dysuria, SI, and HI.     Review of Systems     Review of Systems   Constitutional:  Negative for activity change and unexpected weight change.   HENT:  Negative for hearing loss, rhinorrhea and trouble swallowing.    Eyes:  Negative for discharge and visual disturbance.   Respiratory:  Negative for chest tightness and wheezing.    Cardiovascular:  Negative for chest pain and palpitations.   Gastrointestinal:  Positive for blood in stool and constipation. Negative for diarrhea and vomiting.   Endocrine: Negative for polydipsia and polyuria.   Genitourinary:  Negative for difficulty urinating, hematuria and urgency.   Musculoskeletal:  Negative for arthralgias, joint swelling and neck pain.   Neurological:  Negative for weakness and headaches.   Psychiatric/Behavioral:  Negative for confusion and dysphoric mood.        Medical / Social / Family History     Past Medical History:   Diagnosis Date    Cerebrovascular disease     CHF (congestive heart failure)     HLD (hyperlipidemia)     Hypertension     Lumbar herniated disc     Thrombus     LEFT VENTRICULAR     Past Surgical History:   Procedure Laterality Date    SPINE SURGERY  lower fusion     Social History  Jeff Sanchez Jr.'s  reports that he has been smoking cigars. He has quit using smokeless tobacco. He reports that he does not currently use alcohol. He reports current drug use. Drug: Marijuana.    Family History  Jeff Sanchez Jr.'s family history includes Diabetes in his mother; Heart attack in his maternal grandfather and paternal grandfather; Hyperlipidemia in his mother; Hypertension in his mother and sister; Lung cancer in his father; Schizophrenia in his father; Stroke in his mother and sister.    Medications and Allergies      Medications  Current Outpatient Medications   Medication Instructions    aspirin (ECOTRIN) 81 mg, Daily    atorvastatin (LIPITOR) 80 mg, Oral, Nightly    carvediloL (COREG) 12.5 mg, Oral, 2 times daily    sacubitriL-valsartan (ENTRESTO)  mg per tablet 1 tablet, Oral, 2 times daily        Allergies  Review of patient's allergies indicates:  No Known Allergies    Physical Examination   No vitals due to virtual visit.  Physical Exam  Constitutional:       Appearance: Normal appearance. He is normal weight.   HENT:      Head: Normocephalic.      Nose: Nose normal.   Eyes:      Extraocular Movements: Extraocular movements intact.      Conjunctiva/sclera: Conjunctivae normal.      Pupils: Pupils are equal, round, and reactive to light.   Pulmonary:      Effort: Pulmonary effort is normal.   Musculoskeletal:         General: Normal range of motion.      Cervical back: Normal range of motion.   Neurological:      General: No focal deficit present.      Mental Status: He is alert and oriented to person, place, and time.   Psychiatric:         Mood and Affect: Mood normal.           Results     Lab Results   Component Value Date    WBC 8.47 02/12/2025    RBC 5.30 02/12/2025    HGB 15.8 02/12/2025    HCT 47.5 02/12/2025    MCV 89.6 02/12/2025    MCH 29.8 02/12/2025    MCHC 33.3 02/12/2025    RDW 13.4 02/12/2025     02/12/2025    MPV 10.4 02/12/2025     Lab Results   Component Value Date     02/12/2025    K 4.2 02/12/2025     02/12/2025    CO2 25 02/12/2025     (H) 02/12/2025    BUN 15.3 02/12/2025    CREATININE 1.08 02/12/2025    CALCIUM 9.5 02/12/2025    PROT 7.7 02/12/2025    ALBUMIN 4.0 02/12/2025    BILITOT 0.3 02/12/2025    ALKPHOS 95 02/12/2025    AST 24 02/12/2025    ALT 30 02/12/2025    ANIONGAP 8 04/02/2022    EGFRNONAA 49 05/26/2022     Lab Results   Component Value Date    TSH 1.175 08/12/2024    T4FREE 1.28 03/30/2022     Lab Results   Component Value Date    CHOL 136 02/12/2025     HDL 22 (L) 02/12/2025    LDL 61.00 08/12/2024    TRIG 422 (H) 02/12/2025     Lab Results   Component Value Date    SGUA 1.018 08/12/2024    PROTEINUA Negative 08/12/2024    BILIRUBINUA Negative 08/12/2024    RBCUA 0-5 08/12/2024    BACTERIA None Seen 08/12/2024    NITRITE Negative 08/12/2024    LEUKOCYTESUR Negative 08/12/2024    UROBILINOGEN Normal 08/12/2024      Lab Results   Component Value Date    CREATRANDUR 142.3 02/12/2025    MICALBCREAT 53.8 (H) 02/12/2025     Lab Results   Component Value Date    FZVYGUMJ26CF 40 08/12/2024     Lab Results   Component Value Date    HIV Nonreactive 05/22/2023    HEPAIGM Nonreactive 05/22/2023    HEPBSAG Nonreactive 05/22/2023    HEPCAB Nonreactive 05/22/2023     Assessment and Plan (including Health Maintenance)       Problem List Items Addressed This Visit       Change in stool - Primary    Current Assessment & Plan   Ddx: Gastroenteritis.  He reports abdominal pain, diarrhea, and nausea.  Stool studies ordered: H. Pylori, ova, cysts, and parasites ordered - will send orders to be collected at [facility].         Relevant Orders    Stool Exam-Ova,Cysts,Parasites    Helicobacter Pylori Antigen Fecal EIA          Health Maintenance Due   Topic Date Due    Pneumococcal Vaccines (Age 0-49) (1 of 2 - PCV) Never done    COVID-19 Vaccine (1 - 2024-25 season) Never done     Tests to Keep You Healthy    Last Blood Pressure <= 139/89 (3/5/2025): Yes  Tobacco Cessation: NO        Health Maintenance Topics with due status: Not Due       Topic Last Completion Date    Hemoglobin A1c (Diabetic Prevention Screening) 08/12/2024    Lipid Panel 02/12/2025    High Dose Statin 03/05/2025    Influenza Vaccine Not Due    RSV Vaccine (Age 60+ and Pregnant patients) Not Due       Future Appointments   Date Time Provider Department Center   7/8/2025  1:30 PM Larry Martinez MD [facility] JASMYN [place]   8/14/2025  7:40 AM Alfreda Jones FNP [facility] LETICIA [place]        Follow  up if symptoms worsen or fail to improve, keep scheduled appt on 8/14/2025.           Signature:  KELVIN Pineda  [facility]  [address] [id number]    Date of encounter: 5/26/25      This note was generated with the assistance of ambient listening technology. Verbal consent was obtained by the patient and accompanying visitor(s) for the recording of patient appointment to facilitate this note. I attest to having reviewed and edited the generated note for accuracy, though some syntax or spelling errors may persist. Please contact the author of this note for any clarification.

## 2025-05-27 ENCOUNTER — TELEPHONE (OUTPATIENT)
Dept: INTERNAL MEDICINE | Facility: CLINIC | Age: 45
End: 2025-05-27
Payer: MEDICAID

## 2025-05-27 NOTE — TELEPHONE ENCOUNTER
Placed call to patient to inform him that lab orders where faxed to [facility] no answer, left a voicemail.

## 2025-06-10 ENCOUNTER — PATIENT MESSAGE (OUTPATIENT)
Dept: INTERNAL MEDICINE | Facility: CLINIC | Age: 45
End: 2025-06-10
Payer: MEDICAID

## 2025-07-08 ENCOUNTER — OFFICE VISIT (OUTPATIENT)
Dept: CARDIOLOGY | Facility: CLINIC | Age: 45
End: 2025-07-08
Payer: MEDICAID

## 2025-07-08 VITALS
DIASTOLIC BLOOD PRESSURE: 82 MMHG | HEART RATE: 58 BPM | WEIGHT: 181.81 LBS | BODY MASS INDEX: 25.45 KG/M2 | HEIGHT: 71 IN | RESPIRATION RATE: 20 BRPM | OXYGEN SATURATION: 99 % | TEMPERATURE: 98 F | SYSTOLIC BLOOD PRESSURE: 129 MMHG

## 2025-07-08 DIAGNOSIS — Z72.89 VAPES NON-NICOTINE CONTAINING SUBSTANCE: ICD-10-CM

## 2025-07-08 DIAGNOSIS — I50.9 CONGESTIVE HEART FAILURE, UNSPECIFIED HF CHRONICITY, UNSPECIFIED HEART FAILURE TYPE: Primary | ICD-10-CM

## 2025-07-08 DIAGNOSIS — E78.2 MIXED HYPERLIPIDEMIA: ICD-10-CM

## 2025-07-08 DIAGNOSIS — I10 PRIMARY HYPERTENSION: ICD-10-CM

## 2025-07-08 PROCEDURE — 93005 ELECTROCARDIOGRAM TRACING: CPT

## 2025-07-08 PROCEDURE — 99214 OFFICE O/P EST MOD 30 MIN: CPT | Mod: PBBFAC | Performed by: INTERNAL MEDICINE

## 2025-07-08 RX ORDER — ATORVASTATIN CALCIUM 80 MG/1
80 TABLET, FILM COATED ORAL NIGHTLY
Qty: 90 TABLET | Refills: 1 | Status: SHIPPED | OUTPATIENT
Start: 2025-07-08 | End: 2026-01-04

## 2025-07-08 RX ORDER — SACUBITRIL AND VALSARTAN 97; 103 MG/1; MG/1
1 TABLET, FILM COATED ORAL 2 TIMES DAILY
Qty: 180 TABLET | Refills: 3 | Status: SHIPPED | OUTPATIENT
Start: 2025-07-08

## 2025-07-08 RX ORDER — CARVEDILOL 12.5 MG/1
12.5 TABLET ORAL 2 TIMES DAILY
Qty: 60 TABLET | Refills: 8 | Status: SHIPPED | OUTPATIENT
Start: 2025-07-08

## 2025-07-08 NOTE — PROGRESS NOTES
"[facility]  Cardiology Clinic Note    Patient's Name: Jeff Sanchez Jr.  : 1980  MRN: [id number]  Date: 2025     Chief Complaint  Chief Complaint   Patient presents with    f/u denies chest pain since LV got SOB once       SUBJECTIVE   HPI:  Jeff Sanchez Jr. is a 44 y.o. male with a PMHx of HFrEF (EF 40%), GERD, DM 2, HLD, anxiety, depression  who presents to [facility] for follow up.    Last visit, Lipitor was increased to 80 mg daily and Coreg was increased to 25 mg BID. States that he was feeling "bad" and was evaluated by PCP. HR at the visit was 46 so Coreg was decreased to 12.5 mg BID. Denies chest pain, palpitations, PND, or LE edema. States that he sleeps on his side due to orthopnea, which is unchanged from previous. He had one episode of shortness of breath while putting up a fence outdoors, but attributes the symptoms to the heat. Adherent to and tolerating current medications.     Review of Systems: A 12-pt ROS was conducted and was negative unless stated in the HPI above.    Cardiac Testing:  Echo 2024  Interpretation Summary    Left Ventricle: The left ventricle is moderately dilated. Moderately increased wall thickness. There is severe concentric hypertrophy. Moderate global hypokinesis present. There is moderately reduced systolic function. Biplane (2D) method of discs ejection fraction is 40%. There is normal diastolic function.    Right Ventricle: Normal right ventricular cavity size. Systolic function is normal.    Left Atrium: Left atrium is mildly dilated.    Right Atrium: Normal right atrial size.    Aortic Valve: The aortic valve is a trileaflet valve. There is no stenosis. There is no significant regurgitation.    Mitral Valve: The mitral valve is structurally normal. There is no stenosis.There is trace regurgitation.    Tricuspid Valve: The tricuspid valve is structurally normal. There is trace regurgitation.    Pulmonic Valve: There " is trace regurgitation.    Aorta: Aortic root is normal in size measuring 3.6 cm. Ascending aorta is mildly dilated measuring 3.6 cm.    Pulmonary Artery: No pulmonary hypertension. The estimated pulmonary artery systolic pressure is 17 mmHg.    IVC/SVC: Normal venous pressure at 3 mmHg.    Pericardium: There is no pericardial effusion.    [facility] 4/1/2022  Coronary dominance: Right      Left Main: Mild luminal regularities   Left Anterior Descending: Irregularities.  Distal vessel wraps on the   apex.   Left Circumflex: Nondominant, mild luminal irregularities   Right: Dominant.  Mild luminal irregularities     TTE 3/30/2022  Severe systolic dysfunction global LV hypokinesis.   Echogenic structure near the LV apex and distal septum.  4 x 2 cm.    Concerning for possible thrombus vs. Mass.    Mild mitral regurgitation.   No pericardial effusion.      Past Medical History:   Diagnosis Date    Cerebrovascular disease     CHF (congestive heart failure)     HLD (hyperlipidemia)     Hypertension     Lumbar herniated disc     Thrombus     LEFT VENTRICULAR     Past Surgical History:   Procedure Laterality Date    SPINE SURGERY  lower fusion     Social History     Tobacco Use    Smoking status: Every Day     Types: Cigars, Vaping with nicotine    Smokeless tobacco: Former    Tobacco comments:     Smokes 3 cigars a day   Substance Use Topics    Alcohol use: Not Currently     Family History   Problem Relation Name Age of Onset    Hypertension Mother rich     Diabetes Mother rich     Stroke Mother rich     Hyperlipidemia Mother rich     Lung cancer Father      Schizophrenia Father      Hypertension Sister bubba     Stroke Sister bubba     Heart attack Maternal Grandfather      Heart attack Paternal Grandfather       Home Medications:  Current Outpatient Medications   Medication Instructions    aspirin (ECOTRIN) 81 mg, Daily    atorvastatin (LIPITOR) 80 mg, Oral, Nightly    carvediloL (COREG) 12.5 mg, Oral, 2 times daily     "sacubitriL-valsartan (ENTRESTO)  mg per tablet 1 tablet, Oral, 2 times daily      Allergies:  Review of patient's allergies indicates:  No Known Allergies    OBJECTIVE     Vitals:    07/08/25 1423   BP: 129/82   BP Location: Left arm   Patient Position: Sitting   Pulse: (!) 58   Resp: 20   Temp: 97.9 °F (36.6 °C)   TempSrc: Oral   SpO2: 99%   Weight: 82.5 kg (181 lb 12.8 oz)   Height: 5' 11" (1.803 m)     Body mass index is 25.36 kg/m².    Physical Exam  Vitals reviewed.   Constitutional:       General: He is not in acute distress.     Appearance: He is not ill-appearing.   HENT:      Head: Normocephalic and atraumatic.   Neck:      Vascular: No carotid bruit, hepatojugular reflux or JVD.   Cardiovascular:      Rate and Rhythm: Normal rate and regular rhythm.      Pulses: Normal pulses.      Heart sounds: S1 normal and S2 normal.   Pulmonary:      Effort: Pulmonary effort is normal. No respiratory distress.      Breath sounds: Normal breath sounds. No wheezing, rhonchi or rales.   Musculoskeletal:      Right lower leg: No edema.      Left lower leg: No edema.   Skin:     General: Skin is warm and dry.      Capillary Refill: Capillary refill takes less than 2 seconds.   Neurological:      Mental Status: He is alert.       Labs:  CBC:  Lab Results   Component Value Date    WBC 8.47 02/12/2025    HGB 15.8 02/12/2025    HCT 47.5 02/12/2025     02/12/2025    MCV 89.6 02/12/2025    RDW 13.4 02/12/2025     BMP/CMP:  Lab Results   Component Value Date     02/12/2025    K 4.2 02/12/2025     02/12/2025    CO2 25 02/12/2025    BUN 15.3 02/12/2025    CREATININE 1.08 02/12/2025    EGFRNORACEVR >60 02/12/2025     RFTs/LFTs:  Lab Results   Component Value Date    CALCIUM 9.5 02/12/2025    LABPROT 21.3 (H) 04/06/2022    ALBUMIN 4.0 02/12/2025    AST 24 02/12/2025    ALT 30 02/12/2025    ALKPHOS 95 02/12/2025    BILITOT 0.3 02/12/2025     Lipid Panel:  Lab Results   Component Value Date    CHOL 136 " 02/12/2025    CHOL 143 08/12/2024    HDL 22 (L) 02/12/2025    HDL 29 (L) 08/12/2024    LDL 61.00 08/12/2024    LDL 51.00 08/03/2023    TRIG 422 (H) 02/12/2025    TRIG 263 (H) 08/12/2024     Diabetes Panel:  Lab Results   Component Value Date    HGBA1C 5.5 08/12/2024    HGBA1C 5.2 05/22/2023    LABMICR 76.6 (H) 02/12/2025    LABMICR 14.3 08/12/2024    CREATRANDUR 142.3 02/12/2025    CREATRANDUR 109.2 08/12/2024    MICALBCREAT 53.8 (H) 02/12/2025    MICALBCREAT 13.1 08/12/2024         ASSESSMENT/PLAN   Jeff Sanchez Jr. is a 44 y.o. male with a PMHx of HFrEF (EF 40%), GERD, DM 2, HLD, anxiety, depression  who presents to [facility] for follow up.    Congestive heart failure, unspecified HF chronicity, unspecified heart failure type  Cardiomyopathy likely secondary to drug use   - TTE 01/24/2024: EF 40%, severe concentric hypertrophy   - Counseled on low-sodium, heart healthy diet and exercise as tolerated   - Continue Entresto  mg BID and Coreg 12.5mg BID   - Repeat TTE ordered  - Discussed initiating Jardiance and Aldactone but patient hesitant to introduce more medications at this time. Will repeat TTE and discuss initiating medications at next visit    Primary hypertension  - /82, HR 58 in clinic today  - Home BP log ranged from 110-140/70-90s  - Continue medications as above    Mixed hyperlipidemia  - Lipid panel 2/21/2025: total 136, HDL 22, , unable to calculate LDL due to TG>400  - Continue Lipitor 80 mg daily  - Counseled on low-cholesterol, heart healthy diet and exercise as tolerated  - Repeat Lipid panel in 3 months  - Discussed initiating Vascepa at follow up if needed    History of substance abuse  Cigar use  Vapes non-nicotine containing substance  - Continues to smoke cigars 3-4 per day, down from 8-9 cigarettes per day  - Referred to smoking cessation program by PCP but missed initial phone call. Encouraged to return call to enroll in program  - Prior methamphetamine use,  denies use since he was diagnosed with heart failure/cardiomyopathy; encouraged continued abstinence      Case was discussed with Dr. Ricks. Please appreciate attending's attestation to follow.    Wendie Washington MD  PGY-II Resident  [facility]  07/08/2025

## 2025-07-08 NOTE — PATIENT INSTRUCTIONS
Schedule and complete ultrasound of heart ( ECHO )  & Lipid panel proir to next appt.    Follow-up appointment in 4 months.    Notice of medication changes.

## 2025-07-09 LAB
OHS QRS DURATION: 132 MS
OHS QTC CALCULATION: 409 MS

## 2025-08-01 ENCOUNTER — HOSPITAL ENCOUNTER (OUTPATIENT)
Dept: CARDIOLOGY | Facility: HOSPITAL | Age: 45
Discharge: HOME | End: 2025-08-01
Payer: MEDICAID

## 2025-08-01 DIAGNOSIS — I50.9 CONGESTIVE HEART FAILURE, UNSPECIFIED HF CHRONICITY, UNSPECIFIED HEART FAILURE TYPE: ICD-10-CM

## 2025-08-01 LAB
APICAL FOUR CHAMBER EJECTION FRACTION: 46 %
APICAL TWO CHAMBER EJECTION FRACTION: 49 %
AV INDEX (PROSTH): 0.94
AV MEAN GRADIENT: 3 MMHG
AV PEAK GRADIENT: 5 MMHG
AV VALVE AREA BY VELOCITY RATIO: 3.8 CM²
AV VALVE AREA: 3.6 CM²
AV VELOCITY RATIO: 1
CV ECHO LV RWT: 0.32 CM
DOP CALC AO PEAK VEL: 1.1 M/S
DOP CALC AO VTI: 25.5 CM
DOP CALC LVOT AREA: 3.8 CM2
DOP CALC LVOT DIAMETER: 2.2 CM
DOP CALC LVOT PEAK VEL: 1.1 M/S
DOP CALC MV VTI: 29.6 CM
DOP CALCLVOT PEAK VEL VTI: 24 CM
E WAVE DECELERATION TIME: 129 MSEC
E/A RATIO: 2.15
E/E' RATIO: 6 M/S
ECHO LV POSTERIOR WALL: 0.9 CM (ref 0.6–1.1)
FRACTIONAL SHORTENING: 23.2 % (ref 28–44)
HR MV ECHO: 51 BPM
INTERVENTRICULAR SEPTUM: 0.8 CM (ref 0.6–1.1)
LEFT ATRIUM AREA SYSTOLIC (APICAL 4 CHAMBER): 13.4 CM2
LEFT ATRIUM SIZE: 3.1 CM
LEFT INTERNAL DIMENSION IN SYSTOLE: 4.3 CM (ref 2.1–4)
LEFT VENTRICLE DIASTOLIC VOLUME: 154 ML
LEFT VENTRICLE END DIASTOLIC VOLUME APICAL 2 CHAMBER: 157 ML
LEFT VENTRICLE END DIASTOLIC VOLUME APICAL 4 CHAMBER: 191 ML
LEFT VENTRICLE END SYSTOLIC VOLUME APICAL 4 CHAMBER: 22.1 ML
LEFT VENTRICLE SYSTOLIC VOLUME: 83 ML
LEFT VENTRICULAR INTERNAL DIMENSION IN DIASTOLE: 5.6 CM (ref 3.5–6)
LEFT VENTRICULAR MASS: 178.1 G
LV LATERAL E/E' RATIO: 5.1 M/S
LV SEPTAL E/E' RATIO: 8.6 M/S
LVED V (TEICH): 154 ML
LVES V (TEICH): 83.1 ML
LVOT MG: 2 MMHG
LVOT MV: 0.69 CM/S
MV MEAN GRADIENT: 1 MMHG
MV PEAK A VEL: 0.4 M/S
MV PEAK E VEL: 0.86 M/S
MV PEAK GRADIENT: 3 MMHG
MV STENOSIS PRESSURE HALF TIME: 43 MS
MV VALVE AREA BY CONTINUITY EQUATION: 3.08 CM2
MV VALVE AREA P 1/2 METHOD: 5.12 CM2
OHS LV EJECTION FRACTION SIMPSONS BIPLANE MOD: 47 %
PV PEAK GRADIENT: 3 MMHG
PV PEAK VELOCITY: 0.9 M/S
RA PRESSURE ESTIMATED: 3 MMHG
TDI LATERAL: 0.17 M/S
TDI SEPTAL: 0.1 M/S
TDI: 0.14 M/S
TRICUSPID ANNULAR PLANE SYSTOLIC EXCURSION: 2.1 CM

## 2025-08-01 PROCEDURE — 93306 TTE W/DOPPLER COMPLETE: CPT

## 2025-08-01 PROCEDURE — 93306 TTE W/DOPPLER COMPLETE: CPT | Mod: 26,,, | Performed by: INTERNAL MEDICINE

## 2025-08-25 ENCOUNTER — LAB VISIT (OUTPATIENT)
Dept: LAB | Facility: HOSPITAL | Age: 45
End: 2025-08-25
Payer: MEDICAID

## 2025-08-25 ENCOUNTER — OFFICE VISIT (OUTPATIENT)
Dept: INTERNAL MEDICINE | Facility: CLINIC | Age: 45
End: 2025-08-25
Payer: MEDICAID

## 2025-08-25 VITALS
WEIGHT: 182.5 LBS | SYSTOLIC BLOOD PRESSURE: 115 MMHG | RESPIRATION RATE: 18 BRPM | TEMPERATURE: 98 F | OXYGEN SATURATION: 98 % | HEART RATE: 59 BPM | BODY MASS INDEX: 25.55 KG/M2 | DIASTOLIC BLOOD PRESSURE: 65 MMHG | HEIGHT: 71 IN

## 2025-08-25 DIAGNOSIS — Z13.29 SCREENING FOR THYROID DISORDER: ICD-10-CM

## 2025-08-25 DIAGNOSIS — E78.2 MIXED HYPERLIPIDEMIA: ICD-10-CM

## 2025-08-25 DIAGNOSIS — I50.9 CHRONIC CONGESTIVE HEART FAILURE, UNSPECIFIED HEART FAILURE TYPE: ICD-10-CM

## 2025-08-25 DIAGNOSIS — R22.1 SUBCUTANEOUS NODULE OF NECK: ICD-10-CM

## 2025-08-25 DIAGNOSIS — Z12.5 SCREENING FOR PROSTATE CANCER: ICD-10-CM

## 2025-08-25 DIAGNOSIS — F17.200 NEEDS SMOKING CESSATION EDUCATION: ICD-10-CM

## 2025-08-25 DIAGNOSIS — Z13.1 SCREENING FOR DIABETES MELLITUS: ICD-10-CM

## 2025-08-25 DIAGNOSIS — Z00.00 WELL ADULT EXAM: Primary | ICD-10-CM

## 2025-08-25 DIAGNOSIS — F32.A ANXIETY AND DEPRESSION: ICD-10-CM

## 2025-08-25 DIAGNOSIS — R41.3 OTHER AMNESIA: ICD-10-CM

## 2025-08-25 DIAGNOSIS — I10 PRIMARY HYPERTENSION: ICD-10-CM

## 2025-08-25 DIAGNOSIS — Z13.21 ENCOUNTER FOR VITAMIN DEFICIENCY SCREENING: ICD-10-CM

## 2025-08-25 DIAGNOSIS — F41.9 ANXIETY AND DEPRESSION: ICD-10-CM

## 2025-08-25 DIAGNOSIS — R73.01 IMPAIRED FASTING GLUCOSE: ICD-10-CM

## 2025-08-25 LAB
25(OH)D3+25(OH)D2 SERPL-MCNC: 46 NG/ML (ref 30–80)
ALBUMIN SERPL-MCNC: 3.9 G/DL (ref 3.5–5)
ALBUMIN/CREAT UR: 14.3 MG/GM CR (ref 0–30)
ALBUMIN/GLOB SERPL: 1.1 RATIO (ref 1.1–2)
ALP SERPL-CCNC: 81 UNIT/L (ref 40–150)
ALT SERPL-CCNC: 56 UNIT/L (ref 0–55)
ANION GAP SERPL CALC-SCNC: 5 MEQ/L
AST SERPL-CCNC: 24 UNIT/L (ref 11–45)
BACTERIA #/AREA URNS AUTO: ABNORMAL /HPF
BASOPHILS # BLD AUTO: 0.05 X10(3)/MCL
BASOPHILS NFR BLD AUTO: 0.6 %
BILIRUB SERPL-MCNC: 0.7 MG/DL
BILIRUB UR QL STRIP.AUTO: NEGATIVE
BUN SERPL-MCNC: 20.3 MG/DL (ref 8.9–20.6)
CALCIUM SERPL-MCNC: 9.2 MG/DL (ref 8.4–10.2)
CHLORIDE SERPL-SCNC: 107 MMOL/L (ref 98–107)
CHOLEST SERPL-MCNC: 108 MG/DL
CHOLEST/HDLC SERPL: 4 {RATIO} (ref 0–5)
CLARITY UR: CLEAR
CO2 SERPL-SCNC: 26 MMOL/L (ref 22–29)
COLOR UR AUTO: ABNORMAL
CREAT SERPL-MCNC: 1.27 MG/DL (ref 0.72–1.25)
CREAT UR-MCNC: 99.7 MG/DL (ref 63–166)
CREAT/UREA NIT SERPL: 16
EOSINOPHIL # BLD AUTO: 0.17 X10(3)/MCL (ref 0–0.9)
EOSINOPHIL NFR BLD AUTO: 2.1 %
ERYTHROCYTE [DISTWIDTH] IN BLOOD BY AUTOMATED COUNT: 13.3 % (ref 11.5–17)
EST. AVERAGE GLUCOSE BLD GHB EST-MCNC: 105.4 MG/DL
GFR SERPLBLD CREATININE-BSD FMLA CKD-EPI: >60 ML/MIN/1.73/M2
GLOBULIN SER-MCNC: 3.4 GM/DL (ref 2.4–3.5)
GLUCOSE SERPL-MCNC: 109 MG/DL (ref 74–100)
GLUCOSE UR QL STRIP: NORMAL
HBA1C MFR BLD: 5.3 %
HCT VFR BLD AUTO: 43.7 % (ref 42–52)
HDLC SERPL-MCNC: 28 MG/DL (ref 35–60)
HGB BLD-MCNC: 14.7 G/DL (ref 14–18)
HGB UR QL STRIP: NEGATIVE
HYALINE CASTS #/AREA URNS LPF: ABNORMAL /LPF
IMM GRANULOCYTES # BLD AUTO: 0.02 X10(3)/MCL (ref 0–0.04)
IMM GRANULOCYTES NFR BLD AUTO: 0.3 %
KETONES UR QL STRIP: NEGATIVE
LDLC SERPL CALC-MCNC: 41 MG/DL (ref 50–140)
LEUKOCYTE ESTERASE UR QL STRIP: NEGATIVE
LYMPHOCYTES # BLD AUTO: 1.84 X10(3)/MCL (ref 0.6–4.6)
LYMPHOCYTES NFR BLD AUTO: 23.1 %
MCH RBC QN AUTO: 30.8 PG (ref 27–31)
MCHC RBC AUTO-ENTMCNC: 33.6 G/DL (ref 33–36)
MCV RBC AUTO: 91.4 FL (ref 80–94)
MICROALBUMIN UR-MCNC: 14.3 UG/ML
MONOCYTES # BLD AUTO: 0.58 X10(3)/MCL (ref 0.1–1.3)
MONOCYTES NFR BLD AUTO: 7.3 %
MUCOUS THREADS URNS QL MICRO: ABNORMAL /LPF
NEUTROPHILS # BLD AUTO: 5.3 X10(3)/MCL (ref 2.1–9.2)
NEUTROPHILS NFR BLD AUTO: 66.6 %
NITRITE UR QL STRIP: NEGATIVE
NRBC BLD AUTO-RTO: 0 %
PH UR STRIP: 5.5 [PH]
PLATELET # BLD AUTO: 167 X10(3)/MCL (ref 130–400)
PMV BLD AUTO: 10.5 FL (ref 7.4–10.4)
POTASSIUM SERPL-SCNC: 4.1 MMOL/L (ref 3.5–5.1)
PROT SERPL-MCNC: 7.3 GM/DL (ref 6.4–8.3)
PROT UR QL STRIP: NEGATIVE
PSA SERPL-MCNC: 0.38 NG/ML
RBC # BLD AUTO: 4.78 X10(6)/MCL (ref 4.7–6.1)
RBC #/AREA URNS AUTO: ABNORMAL /HPF
SODIUM SERPL-SCNC: 138 MMOL/L (ref 136–145)
SP GR UR STRIP.AUTO: 1.02 (ref 1–1.03)
SPERM URNS QL MICRO: ABNORMAL /HPF
SQUAMOUS #/AREA URNS LPF: ABNORMAL /HPF
T4 FREE SERPL-MCNC: 0.89 NG/DL (ref 0.7–1.48)
TRIGL SERPL-MCNC: 193 MG/DL (ref 34–140)
TSH SERPL-ACNC: 0.73 UIU/ML (ref 0.35–4.94)
UROBILINOGEN UR STRIP-ACNC: NORMAL
VLDLC SERPL CALC-MCNC: 39 MG/DL
WBC # BLD AUTO: 7.96 X10(3)/MCL (ref 4.5–11.5)
WBC #/AREA URNS AUTO: ABNORMAL /HPF

## 2025-08-25 PROCEDURE — 80061 LIPID PANEL: CPT

## 2025-08-25 PROCEDURE — 82043 UR ALBUMIN QUANTITATIVE: CPT

## 2025-08-25 PROCEDURE — 84153 ASSAY OF PSA TOTAL: CPT

## 2025-08-25 PROCEDURE — 84439 ASSAY OF FREE THYROXINE: CPT

## 2025-08-25 PROCEDURE — 81015 MICROSCOPIC EXAM OF URINE: CPT

## 2025-08-25 PROCEDURE — 85025 COMPLETE CBC W/AUTO DIFF WBC: CPT

## 2025-08-25 PROCEDURE — 36415 COLL VENOUS BLD VENIPUNCTURE: CPT

## 2025-08-25 PROCEDURE — 82306 VITAMIN D 25 HYDROXY: CPT

## 2025-08-25 PROCEDURE — 84443 ASSAY THYROID STIM HORMONE: CPT

## 2025-08-25 PROCEDURE — 99215 OFFICE O/P EST HI 40 MIN: CPT | Mod: PBBFAC

## 2025-08-25 PROCEDURE — 80053 COMPREHEN METABOLIC PANEL: CPT

## 2025-08-25 PROCEDURE — 83036 HEMOGLOBIN GLYCOSYLATED A1C: CPT

## 2025-08-26 PROBLEM — R41.3 OTHER AMNESIA: Status: ACTIVE | Noted: 2025-08-26

## 2025-08-26 PROBLEM — I50.9 CHF EXACERBATION: Status: RESOLVED | Noted: 2022-03-30 | Resolved: 2025-08-26

## 2025-08-26 PROBLEM — R22.1 SUBCUTANEOUS NODULE OF NECK: Status: ACTIVE | Noted: 2025-08-26

## 2025-08-29 ENCOUNTER — TELEPHONE (OUTPATIENT)
Dept: INTERNAL MEDICINE | Facility: CLINIC | Age: 45
End: 2025-08-29
Payer: MEDICAID

## 2025-09-02 ENCOUNTER — HOSPITAL ENCOUNTER (OUTPATIENT)
Dept: RADIOLOGY | Facility: HOSPITAL | Age: 45
Discharge: HOME | End: 2025-09-02
Payer: MEDICAID

## 2025-09-02 DIAGNOSIS — R22.1 SUBCUTANEOUS NODULE OF NECK: ICD-10-CM

## 2025-09-02 PROCEDURE — 76536 US EXAM OF HEAD AND NECK: CPT | Mod: TC
